# Patient Record
Sex: FEMALE | Race: WHITE | Employment: OTHER | URBAN - METROPOLITAN AREA
[De-identification: names, ages, dates, MRNs, and addresses within clinical notes are randomized per-mention and may not be internally consistent; named-entity substitution may affect disease eponyms.]

---

## 2020-08-03 ENCOUNTER — NURSING HOME VISIT (OUTPATIENT)
Dept: GERIATRICS | Facility: CLINIC | Age: 85
End: 2020-08-03
Payer: MEDICARE

## 2020-08-03 VITALS
RESPIRATION RATE: 17 BRPM | SYSTOLIC BLOOD PRESSURE: 123 MMHG | TEMPERATURE: 95 F | OXYGEN SATURATION: 97 % | HEIGHT: 61 IN | DIASTOLIC BLOOD PRESSURE: 66 MMHG | HEART RATE: 76 BPM | WEIGHT: 125.8 LBS | BODY MASS INDEX: 23.75 KG/M2

## 2020-08-03 DIAGNOSIS — I11.9 HYPERTENSIVE HEART DISEASE WITHOUT HEART FAILURE: ICD-10-CM

## 2020-08-03 DIAGNOSIS — D50.9 IRON DEFICIENCY ANEMIA, UNSPECIFIED IRON DEFICIENCY ANEMIA TYPE: ICD-10-CM

## 2020-08-03 DIAGNOSIS — I50.32 CHRONIC DIASTOLIC CONGESTIVE HEART FAILURE (H): ICD-10-CM

## 2020-08-03 DIAGNOSIS — Z86.73 HISTORY OF CVA (CEREBROVASCULAR ACCIDENT): ICD-10-CM

## 2020-08-03 DIAGNOSIS — G47.00 INSOMNIA, UNSPECIFIED TYPE: ICD-10-CM

## 2020-08-03 DIAGNOSIS — R53.1 GENERAL WEAKNESS: Primary | ICD-10-CM

## 2020-08-03 DIAGNOSIS — I48.20 CHRONIC ATRIAL FIBRILLATION (H): ICD-10-CM

## 2020-08-03 DIAGNOSIS — I25.10 CORONARY ARTERY DISEASE INVOLVING NATIVE CORONARY ARTERY WITHOUT ANGINA PECTORIS, UNSPECIFIED WHETHER NATIVE OR TRANSPLANTED HEART: ICD-10-CM

## 2020-08-03 DIAGNOSIS — J45.40 MODERATE PERSISTENT ASTHMA WITHOUT COMPLICATION: ICD-10-CM

## 2020-08-03 DIAGNOSIS — K21.00 GASTROESOPHAGEAL REFLUX DISEASE WITH ESOPHAGITIS: ICD-10-CM

## 2020-08-03 PROBLEM — R29.6 REPEATED FALLS: Status: ACTIVE | Noted: 2020-08-03

## 2020-08-03 PROBLEM — I48.92 ATRIAL FLUTTER (H): Status: ACTIVE | Noted: 2020-08-03

## 2020-08-03 PROBLEM — M81.0 OSTEOPOROSIS: Status: ACTIVE | Noted: 2020-08-03

## 2020-08-03 PROBLEM — F33.0 MILD RECURRENT MAJOR DEPRESSION (H): Status: ACTIVE | Noted: 2020-08-03

## 2020-08-03 PROBLEM — E78.5 HYPERLIPIDEMIA: Status: ACTIVE | Noted: 2020-08-03

## 2020-08-03 PROCEDURE — 99309 SBSQ NF CARE MODERATE MDM 30: CPT | Performed by: NURSE PRACTITIONER

## 2020-08-03 RX ORDER — ZOLPIDEM TARTRATE 5 MG/1
2.5 TABLET ORAL
COMMUNITY
End: 2021-03-09

## 2020-08-03 RX ORDER — MULTIVIT-MIN/IRON/FOLIC ACID/K 18-600-40
2 CAPSULE ORAL DAILY
COMMUNITY
End: 2022-01-01 | Stop reason: DRUGHIGH

## 2020-08-03 RX ORDER — ALBUTEROL SULFATE 90 UG/1
2 AEROSOL, METERED RESPIRATORY (INHALATION) EVERY 4 HOURS PRN
COMMUNITY
End: 2021-02-01

## 2020-08-03 RX ORDER — CLOPIDOGREL BISULFATE 75 MG/1
75 TABLET ORAL DAILY
COMMUNITY

## 2020-08-03 RX ORDER — ESCITALOPRAM OXALATE 5 MG/1
5 TABLET ORAL DAILY
COMMUNITY

## 2020-08-03 RX ORDER — ISOSORBIDE MONONITRATE 30 MG/1
30 TABLET, EXTENDED RELEASE ORAL DAILY
COMMUNITY
End: 2021-01-01

## 2020-08-03 RX ORDER — PANTOPRAZOLE SODIUM 40 MG/1
40 TABLET, DELAYED RELEASE ORAL DAILY
COMMUNITY

## 2020-08-03 RX ORDER — METOPROLOL TARTRATE 25 MG/1
12.5 TABLET, FILM COATED ORAL 2 TIMES DAILY
COMMUNITY
End: 2021-01-01

## 2020-08-03 RX ORDER — CEFDINIR 300 MG/1
300 CAPSULE ORAL EVERY 12 HOURS
COMMUNITY
End: 2020-08-18

## 2020-08-03 RX ORDER — SENNA AND DOCUSATE SODIUM 50; 8.6 MG/1; MG/1
1 TABLET, FILM COATED ORAL 2 TIMES DAILY PRN
COMMUNITY

## 2020-08-03 RX ORDER — NITROGLYCERIN 0.4 MG/1
0.4 TABLET SUBLINGUAL EVERY 5 MIN PRN
COMMUNITY

## 2020-08-03 RX ORDER — LOSARTAN POTASSIUM 50 MG/1
50 TABLET ORAL DAILY
COMMUNITY
End: 2020-08-24

## 2020-08-03 RX ORDER — HYDROCHLOROTHIAZIDE 25 MG/1
25 TABLET ORAL DAILY
Status: ON HOLD | COMMUNITY
End: 2021-01-01

## 2020-08-03 RX ORDER — LANOLIN ALCOHOL/MO/W.PET/CERES
6 CREAM (GRAM) TOPICAL
COMMUNITY
End: 2021-01-01

## 2020-08-03 RX ORDER — ROSUVASTATIN CALCIUM 5 MG/1
5 TABLET, COATED ORAL EVERY OTHER DAY
COMMUNITY
Start: 2020-08-03

## 2020-08-03 SDOH — HEALTH STABILITY: MENTAL HEALTH: HOW OFTEN DO YOU HAVE A DRINK CONTAINING ALCOHOL?: NEVER

## 2020-08-03 ASSESSMENT — MIFFLIN-ST. JEOR: SCORE: 932.69

## 2020-08-03 NOTE — LETTER
8/3/2020        RE: Ermelinda Pfeiffer  [address]  [facility] [address]        [place] [facility]  PRIMARY CARE PROVIDER AND CLINIC:  Physician No Ref-Primary, No address on file  Chief Complaint   Patient presents with     Hospital F/U     [place] Medical Record Number:  [id number]  Place of Service where encounter took place:  [facility] ([facility]) [[id number]]    HPI:    HPI information obtained from: facility chart records, facility staff, patient report, [facility] chart review and Care Everywhere [facility] chart review.     Brief Summary of Hospital Course:   Ermelinda Pfeiffer  is a 89 year old  (10/19/1930) with a medical history of chronic diastolic heart failure, CAD with stent placement in 92' and 97', atrial fibrillation, pontine CVA in 2019, and osteoporosis. She currently lives at [facility] in [place], [place] where she had progressive weakness and went to her PCP for evaluation. While there, she was less interactive and was sent to [place] for further evaluation.  CT negative, neg blood cultures, neg chest xray. Urine culture returned with  K of ecoli and she was started on mabrobid which was changed to cefdinir when her WBC increased to 20. She developed atrial fib with RVR and was started on metoprolol. Also restarted on PPI for a hx of esophagitis; had some black stool. She was admitted to the above facility from  [facility]. Hospital stay 7/27/2020 through 8/1/2020. Admitted to this facility for  rehab, medical management and nursing care.    Updates on Status Since Skilled nursing Admission:   Ms. Pfeiffer was visited today while in her room just waking for the day. She slept well but felt cold during the night. Has some discomfort in the bilateral heels from being on the bed. She is eating fair, having small bowel movements but no discomfort in having bowel movements. No nausea. Generally ambulates with walker. She has  two daughters who live in the area which is why she came [place] from [place].     CODE STATUS/ADVANCE DIRECTIVES DISCUSSION:   CPR/Full code   Patient's living condition: Assisted Living Facility - [place]  ALLERGIES: Clarithromycin; Levofloxacin; Mirtazapine; Penicillin g; and Sulfamethoxazole-trimethoprim  PAST MEDICAL HISTORY:  has a past medical history of Acute sinus infection, Anemia, CAD (coronary artery disease), Cancer of the skin, basal cell, Cataract, Depressive disorder, Diplopia, Disturbance, sleep, GERD (gastroesophageal reflux disease), HTN (hypertension), Hyperlipidemia, Insomnia, Myocardial infarction (H), Osteoporosis, Skin cancer, Status post parathyroidectomy (05/23/2016), Stone, kidney, and Transient ischemic attack.  PAST SURGICAL HISTORY:   has a past surgical history that includes Parathyroidectomy (1969) and PERCUTANEOUS TRANSLUMINAL BALLOON ANGIOPLASTY WITH INSERTION OF STENT INTO CORONARY ARTERY N/A  (10/29/1997).  FAMILY HISTORY: family history includes Suicide in her maternal aunt and maternal uncle.  SOCIAL HISTORY:   reports that she has quit smoking. She has a 3.25 pack-year smoking history. She has never used smokeless tobacco. She reports that she does not drink alcohol or use drugs.    Post Discharge Medication Reconciliation Status: {ACO Med Rec (Provider):136355}  ***  Current Outpatient Medications   Medication Sig Dispense Refill     albuterol (PROAIR HFA/PROVENTIL HFA/VENTOLIN HFA) 108 (90 Base) MCG/ACT inhaler Inhale 2 puffs into the lungs every 4 hours as needed for shortness of breath / dyspnea or wheezing       cefdinir (OMNICEF) 300 MG capsule Take 300 mg by mouth every 12 hours for UTI for 6 Days       clopidogrel (PLAVIX) 75 MG tablet Take 75 mg by mouth daily       escitalopram (LEXAPRO) 5 MG tablet Take 5 mg by mouth daily       ferrous fumarate 65 mg, [place]. FE,-Vitamin C 125 mg (VITRON C)  MG TABS tablet Take 1 tablet by mouth daily        "fluticasone-salmeterol (ADVAIR) 500-50 MCG/DOSE inhaler Inhale 1 puff into the lungs every 12 hours for Asthma Rinse and spit after each use.       hydrochlorothiazide (HYDRODIURIL) 25 MG tablet Take 25 mg by mouth daily every other day for blood pressure and fluid managment       isosorbide mononitrate (IMDUR) 30 MG 24 hr tablet Take 30 mg by mouth daily       losartan (COZAAR) 50 MG tablet Take 50 mg by mouth daily       melatonin 3 MG tablet Take 6 mg by mouth At Bedtime       metoprolol tartrate (LOPRESSOR) 25 MG tablet Take 12.5 mg by mouth 2 times daily       nitroGLYcerin (NITROSTAT) 0.4 MG sublingual tablet Place 0.4 mg under the tongue every 5 minutes as needed for chest pain For chest pain place 1 tablet under the tongue every 5 minutes for 3 doses. If symptoms persist 5 minutes after 1st dose call 911.       pantoprazole (PROTONIX) 40 MG EC tablet Take 40 mg by mouth daily for Esophagitis Before breakfast       rosuvastatin (CRESTOR) 5 MG tablet Take 1 tablet (5 mg) by mouth daily       SENNA-docusate sodium (SENNA S) 8.6-50 MG tablet Take 1 tablet by mouth one time a day every 3 day(s) for constipation per patient request AND Give 1 tablet by mouth as needed for constipation BID       Vitamin D, Cholecalciferol, 25 MCG (1000 UT) TABS Take 1 tablet by mouth daily       zolpidem (AMBIEN) 5 MG tablet Take 2.5 mg by mouth nightly as needed for sleep       ROS:  4 point ROS including Respiratory, CV, GI and , other than that noted in the HPI,  is negative    Vitals:  /66   Pulse 76   Temp 95  F (35  C)   Resp 17   Ht 1.549 m (5' 0.98\")   Wt 57.1 kg (125 lb 12.8 oz)   SpO2 97%   BMI 23.79 kg/m    Exam:  GENERAL APPEARANCE:  Alert, in no distress, pleasant, cooperative  RESP: no respiratory distress, patient is on room air  CV: . Edema trace in bilateral lower extremities.  M/S:   Gait and station: ambulates with walker, able to move all extremities   SKIN:  Inspection and palpation of skin and " subcutaneous tissue: skin warm, dry and intact without rashes but exam is limited  NEURO: no facial asymmetry, no speech deficits and able to follow directions, moves all extremities symmetrically  PSYCH:  insight, judgement, and memory intact, affect and mood normal    Lab/Diagnostic data:  8/1/20  HGB 10.1  WBC 12.5    K 4.4  Creatinine 0.85    ASSESSMENT/PLAN:  Generalized weakness  Urinary tract infection  Weakness likely due to UTI, underlying medical conditions and decreased mobility with pandemic.   --continue with cefdinir 300 mg BID for 7 day course with last day on 8/9.  --PT/OT for strengthening.     Diastolic HF  CAD  Hypertensive heart disease  Hydrochlorothiazide increased to daily administration after chest xrays showed venous congestion. This was ordered as every other day on discharge orders which will continue with for now as there is no edema today and oxygen saturations have been mid 90s, SBPs have been 120s.   --continue with plavix 75 mg daily  --hydrochlorothiazide 25 mg every other daily  --losartan 50 mg daily  --isosorbide 30 mg daily  --BMP on next lab day  --Continue to monitor blood pressure and adjust medications as needed.    Atrial fibrillation  HR 70-80s. Irregular rhythm today. Started on metoprolol while in the hospital.   --continue with metoprolol 12.5 mg BID  --monitor HR and adjust medications as needed.     Asthma   No wheezing on exam today  --continue with advair 1 puff BID  --albuterol PRN    Left pontine CVA  Does not appear to have deficits from this.   --follow clinically  --rosuvastatin 5 mg daily.     Hx of esophagitis  Restarted on PPI during hospitalization as she was encouraged to take this indefinately. HGB decreased to 10 down from 11.   --recheck HGB this week  --continue with iron and vitamin C supplement  --daily pantaprazole 40 mg daily    Insomnia  Slept well last night. Takes ambien PRN and shared the risks of this med but would still like available to  her if needed.   --melatonin 6 mg at HS  --ambien 2.5 mg daily PRN    Total time spent with patient visit at the skilled nursing facility was 35 min including patient visit, review of past records and phone call to patient contact, Rosalba; message left. Greater than 50% of total time spent with counseling and coordinating care due to the need for follow up bloodwork, current UTI and treatment, medication review.     Electronically signed by:  AZAEL Austin CNP                     Sincerely,        AZAEL Austin CNP

## 2020-08-03 NOTE — LETTER
8/3/2020        RE: Ermelinda Pfeiffer  [address]  [facility] [address]        [place] [facility]  PRIMARY CARE PROVIDER AND CLINIC:  Physician No Ref-Primary, No address on file  Chief Complaint   Patient presents with     Hospital F/U     [place] Medical Record Number:  [id number]  Place of Service where encounter took place:  [facility] ([facility]) [[id number]]    HPI:    HPI information obtained from: facility chart records, facility staff, patient report, [facility] chart review and Care Everywhere [facility] chart review.     Brief Summary of Hospital Course:   Ermelinda Pfeiffer  is a 89 year old  (10/19/1930) with a medical history of chronic diastolic heart failure, CAD with stent placement in 92' and 97', atrial fibrillation, pontine CVA in 2019, and osteoporosis. She currently lives at [facility] in [place], [place] where she had progressive weakness and went to her PCP for evaluation. While there, she was less interactive and was sent to [place] for further evaluation.  CT negative, neg blood cultures, neg chest xray. Urine culture returned with  K of ecoli and she was started on mabrobid which was changed to cefdinir when her WBC increased to 20. She developed atrial fib with RVR and was started on metoprolol. Also restarted on PPI for a hx of esophagitis; had some black stool. She was admitted to the above facility from  [facility]. Hospital stay 7/27/2020 through 8/1/2020. Admitted to this facility for  rehab, medical management and nursing care.    Updates on Status Since Skilled nursing Admission:   Ms. Pfeiffer was visited today while in her room just waking for the day. She slept well but felt cold during the night. Has some discomfort in the bilateral heels from being on the bed. She is eating fair, having small bowel movements but no discomfort in having bowel movements. No nausea. Generally ambulates with walker. She has  two daughters who live in the area which is why she came [place] from [place].     CODE STATUS/ADVANCE DIRECTIVES DISCUSSION:   CPR/Full code   Patient's living condition: Assisted Living Facility - [facility]  ALLERGIES: Clarithromycin; Levofloxacin; Mirtazapine; Penicillin g; and Sulfamethoxazole-trimethoprim  PAST MEDICAL HISTORY:  has a past medical history of Acute sinus infection, Anemia, CAD (coronary artery disease), Cancer of the skin, basal cell, Cataract, Depressive disorder, Diplopia, Disturbance, sleep, GERD (gastroesophageal reflux disease), HTN (hypertension), Hyperlipidemia, Insomnia, Myocardial infarction (H), Osteoporosis, Skin cancer, Status post parathyroidectomy (05/23/2016), Stone, kidney, and Transient ischemic attack.  PAST SURGICAL HISTORY:   has a past surgical history that includes Parathyroidectomy (1969) and PERCUTANEOUS TRANSLUMINAL BALLOON ANGIOPLASTY WITH INSERTION OF STENT INTO CORONARY ARTERY N/A  (10/29/1997).  FAMILY HISTORY: family history includes Suicide in her maternal aunt and maternal uncle.  SOCIAL HISTORY:   reports that she has quit smoking. She has a 3.25 pack-year smoking history. She has never used smokeless tobacco. She reports that she does not drink alcohol or use drugs.    Post Discharge Medication Reconciliation Status: discharge medications reconciled, continue medications without change    Current Outpatient Medications   Medication Sig Dispense Refill     albuterol (PROAIR HFA/PROVENTIL HFA/VENTOLIN HFA) 108 (90 Base) MCG/ACT inhaler Inhale 2 puffs into the lungs every 4 hours as needed for shortness of breath / dyspnea or wheezing       cefdinir (OMNICEF) 300 MG capsule Take 300 mg by mouth every 12 hours for UTI for 6 Days       clopidogrel (PLAVIX) 75 MG tablet Take 75 mg by mouth daily       escitalopram (LEXAPRO) 5 MG tablet Take 5 mg by mouth daily       ferrous fumarate 65 mg, [place]. FE,-Vitamin C 125 mg (VITRON C)  MG TABS tablet Take 1  "tablet by mouth daily       fluticasone-salmeterol (ADVAIR) 500-50 MCG/DOSE inhaler Inhale 1 puff into the lungs every 12 hours for Asthma Rinse and spit after each use.       hydrochlorothiazide (HYDRODIURIL) 25 MG tablet Take 25 mg by mouth daily every other day for blood pressure and fluid managment       isosorbide mononitrate (IMDUR) 30 MG 24 hr tablet Take 30 mg by mouth daily       losartan (COZAAR) 50 MG tablet Take 50 mg by mouth daily       melatonin 3 MG tablet Take 6 mg by mouth At Bedtime       metoprolol tartrate (LOPRESSOR) 25 MG tablet Take 12.5 mg by mouth 2 times daily       nitroGLYcerin (NITROSTAT) 0.4 MG sublingual tablet Place 0.4 mg under the tongue every 5 minutes as needed for chest pain For chest pain place 1 tablet under the tongue every 5 minutes for 3 doses. If symptoms persist 5 minutes after 1st dose call 911.       pantoprazole (PROTONIX) 40 MG EC tablet Take 40 mg by mouth daily for Esophagitis Before breakfast       rosuvastatin (CRESTOR) 5 MG tablet Take 1 tablet (5 mg) by mouth daily       SENNA-docusate sodium (SENNA S) 8.6-50 MG tablet Take 1 tablet by mouth one time a day every 3 day(s) for constipation per patient request AND Give 1 tablet by mouth as needed for constipation BID       Vitamin D, Cholecalciferol, 25 MCG (1000 UT) TABS Take 1 tablet by mouth daily       zolpidem (AMBIEN) 5 MG tablet Take 2.5 mg by mouth nightly as needed for sleep       ROS:  4 point ROS including Respiratory, CV, GI and , other than that noted in the HPI,  is negative    Vitals:  /66   Pulse 76   Temp 95  F (35  C)   Resp 17   Ht 1.549 m (5' 0.98\")   Wt 57.1 kg (125 lb 12.8 oz)   SpO2 97%   BMI 23.79 kg/m    Exam:  GENERAL APPEARANCE:  Alert, in no distress, pleasant, cooperative  RESP: no respiratory distress, patient is on room air  CV: . Edema trace in bilateral lower extremities.  M/S:   Gait and station: ambulates with walker, able to move all extremities   SKIN:  Inspection " and palpation of skin and subcutaneous tissue: skin warm, dry and intact without rashes but exam is limited  NEURO: no facial asymmetry, no speech deficits and able to follow directions, moves all extremities symmetrically  PSYCH:  insight, judgement, and memory intact, affect and mood normal    Lab/Diagnostic data:  8/1/20  HGB 10.1  WBC 12.5    K 4.4  Creatinine 0.85    ASSESSMENT/PLAN:  Generalized weakness  Urinary tract infection  Weakness likely due to UTI, underlying medical conditions and decreased mobility with pandemic.   --continue with cefdinir 300 mg BID for 7 day course with last day on 8/9.  --PT/OT for strengthening.     Diastolic HF  CAD  Hypertensive heart disease  Hydrochlorothiazide increased to daily administration after chest xrays showed venous congestion. This was ordered as every other day on discharge orders which will continue with for now as there is no edema today and oxygen saturations have been mid 90s, SBPs have been 120s.   --continue with plavix 75 mg daily  --hydrochlorothiazide 25 mg every other daily  --losartan 50 mg daily  --isosorbide 30 mg daily  --BMP on next lab day  --Continue to monitor blood pressure and adjust medications as needed.    Atrial fibrillation  HR 70-80s. Irregular rhythm today. Started on metoprolol while in the hospital.   --continue with metoprolol 12.5 mg BID  --monitor HR and adjust medications as needed.     Asthma   No wheezing on exam today  --continue with advair 1 puff BID  --albuterol PRN    Left pontine CVA  Does not appear to have deficits from this.   --follow clinically  --rosuvastatin 5 mg daily.     Hx of esophagitis  Restarted on PPI during hospitalization as she was encouraged to take this indefinately. HGB decreased to 10 down from 11.   --recheck HGB this week  --continue with iron and vitamin C supplement  --daily pantaprazole 40 mg daily    Insomnia  Slept well last night. Takes ambien PRN and shared the risks of this med but  would still like available to her if needed.   --melatonin 6 mg at HS  --ambien 2.5 mg daily PRN    Total time spent with patient visit at the skilled nursing facility was 35 min including patient visit, review of past records and phone call to patient contact, Rosalba; message left. Greater than 50% of total time spent with counseling and coordinating care due to the need for follow up bloodwork, current UTI and treatment, medication review.     Electronically signed by:  AZAEL Austin CNP                     Sincerely,        AZAEL Austin CNP

## 2020-08-03 NOTE — PROGRESS NOTES
[place] [facility]  PRIMARY CARE PROVIDER AND CLINIC:  Physician No Ref-Primary, No address on file  Chief Complaint   Patient presents with     Hospital F/U     [place] Medical Record Number:  [id number]  Place of Service where encounter took place:  [facility] ([facility]) [[id number]]    HPI:    HPI information obtained from: facility chart records, facility staff, patient report, [facility] chart review and Care Everywhere [facility] chart review.     Brief Summary of Hospital Course:   Ermelinda Pfeiffer  is a 89 year old  (10/19/1930) with a medical history of chronic diastolic heart failure, CAD with stent placement in 92' and 97', atrial fibrillation, pontine CVA in 2019, and osteoporosis. She currently lives at [facility] in [place], [place] where she had progressive weakness and went to her PCP for evaluation. While there, she was less interactive and was sent to [place] for further evaluation.  CT negative, neg blood cultures, neg chest xray. Urine culture returned with  K of ecoli and she was started on mabrobid which was changed to cefdinir when her WBC increased to 20. She developed atrial fib with RVR and was started on metoprolol. Also restarted on PPI for a hx of esophagitis; had some black stool. She was admitted to the above facility from  [facility]. Hospital stay 7/27/2020 through 8/1/2020. Admitted to this facility for  rehab, medical management and nursing care.    Updates on Status Since Skilled nursing Admission:   Ms. Pfeiffer was visited today while in her room just waking for the day. She slept well but felt cold during the night. Has some discomfort in the bilateral heels from being on the bed. She is eating fair, having small bowel movements but no discomfort in having bowel movements. No nausea. Generally ambulates with walker. She has two daughters who live in the area which is why she came [place] from [place].     CODE  STATUS/ADVANCE DIRECTIVES DISCUSSION:   CPR/Full code   Patient's living condition: Assisted Living Facility [facility]  ALLERGIES: Clarithromycin; Levofloxacin; Mirtazapine; Penicillin g; and Sulfamethoxazole-trimethoprim  PAST MEDICAL HISTORY:  has a past medical history of Acute sinus infection, Anemia, CAD (coronary artery disease), Cancer of the skin, basal cell, Cataract, Depressive disorder, Diplopia, Disturbance, sleep, GERD (gastroesophageal reflux disease), HTN (hypertension), Hyperlipidemia, Insomnia, Myocardial infarction (H), Osteoporosis, Skin cancer, Status post parathyroidectomy (05/23/2016), Stone, kidney, and Transient ischemic attack.  PAST SURGICAL HISTORY:   has a past surgical history that includes Parathyroidectomy (1969) and PERCUTANEOUS TRANSLUMINAL BALLOON ANGIOPLASTY WITH INSERTION OF STENT INTO CORONARY ARTERY N/A  (10/29/1997).  FAMILY HISTORY: family history includes Suicide in her maternal aunt and maternal uncle.  SOCIAL HISTORY:   reports that she has quit smoking. She has a 3.25 pack-year smoking history. She has never used smokeless tobacco. She reports that she does not drink alcohol or use drugs.    Post Discharge Medication Reconciliation Status: discharge medications reconciled, continue medications without change    Current Outpatient Medications   Medication Sig Dispense Refill     albuterol (PROAIR HFA/PROVENTIL HFA/VENTOLIN HFA) 108 (90 Base) MCG/ACT inhaler Inhale 2 puffs into the lungs every 4 hours as needed for shortness of breath / dyspnea or wheezing       cefdinir (OMNICEF) 300 MG capsule Take 300 mg by mouth every 12 hours for UTI for 6 Days       clopidogrel (PLAVIX) 75 MG tablet Take 75 mg by mouth daily       escitalopram (LEXAPRO) 5 MG tablet Take 5 mg by mouth daily       ferrous fumarate 65 mg, [place]. FE,-Vitamin C 125 mg (VITRON C)  MG TABS tablet Take 1 tablet by mouth daily       fluticasone-salmeterol (ADVAIR) 500-50 MCG/DOSE inhaler Inhale 1 puff  "into the lungs every 12 hours for Asthma Rinse and spit after each use.       hydrochlorothiazide (HYDRODIURIL) 25 MG tablet Take 25 mg by mouth daily every other day for blood pressure and fluid managment       isosorbide mononitrate (IMDUR) 30 MG 24 hr tablet Take 30 mg by mouth daily       losartan (COZAAR) 50 MG tablet Take 50 mg by mouth daily       melatonin 3 MG tablet Take 6 mg by mouth At Bedtime       metoprolol tartrate (LOPRESSOR) 25 MG tablet Take 12.5 mg by mouth 2 times daily       nitroGLYcerin (NITROSTAT) 0.4 MG sublingual tablet Place 0.4 mg under the tongue every 5 minutes as needed for chest pain For chest pain place 1 tablet under the tongue every 5 minutes for 3 doses. If symptoms persist 5 minutes after 1st dose call 911.       pantoprazole (PROTONIX) 40 MG EC tablet Take 40 mg by mouth daily for Esophagitis Before breakfast       rosuvastatin (CRESTOR) 5 MG tablet Take 1 tablet (5 mg) by mouth daily       SENNA-docusate sodium (SENNA S) 8.6-50 MG tablet Take 1 tablet by mouth one time a day every 3 day(s) for constipation per patient request AND Give 1 tablet by mouth as needed for constipation BID       Vitamin D, Cholecalciferol, 25 MCG (1000 UT) TABS Take 1 tablet by mouth daily       zolpidem (AMBIEN) 5 MG tablet Take 2.5 mg by mouth nightly as needed for sleep       ROS:  4 point ROS including Respiratory, CV, GI and , other than that noted in the HPI,  is negative    Vitals:  /66   Pulse 76   Temp 95  F (35  C)   Resp 17   Ht 1.549 m (5' 0.98\")   Wt 57.1 kg (125 lb 12.8 oz)   SpO2 97%   BMI 23.79 kg/m    Exam:  GENERAL APPEARANCE:  Alert, in no distress, pleasant, cooperative  RESP: no respiratory distress, patient is on room air  CV: . Edema trace in bilateral lower extremities.  M/S:   Gait and station: ambulates with walker, able to move all extremities   SKIN:  Inspection and palpation of skin and subcutaneous tissue: skin warm, dry and intact without rashes but exam " is limited  NEURO: no facial asymmetry, no speech deficits and able to follow directions, moves all extremities symmetrically  PSYCH:  insight, judgement, and memory intact, affect and mood normal    Lab/Diagnostic data:  8/1/20  HGB 10.1  WBC 12.5    K 4.4  Creatinine 0.85    ASSESSMENT/PLAN:  Generalized weakness  Urinary tract infection  Weakness likely due to UTI, underlying medical conditions and decreased mobility with pandemic.   --continue with cefdinir 300 mg BID for 7 day course with last day on 8/9.  --PT/OT for strengthening.     Diastolic HF  CAD  Hypertensive heart disease  Hydrochlorothiazide increased to daily administration after chest xrays showed venous congestion. This was ordered as every other day on discharge orders which will continue with for now as there is no edema today and oxygen saturations have been mid 90s, SBPs have been 120s.   --continue with plavix 75 mg daily  --hydrochlorothiazide 25 mg every other daily  --losartan 50 mg daily  --isosorbide 30 mg daily  --BMP on next lab day  --Continue to monitor blood pressure and adjust medications as needed.    Atrial fibrillation  HR 70-80s. Irregular rhythm today. Started on metoprolol while in the hospital.   --continue with metoprolol 12.5 mg BID  --monitor HR and adjust medications as needed.     Asthma   No wheezing on exam today  --continue with advair 1 puff BID  --albuterol PRN    Left pontine CVA  Does not appear to have deficits from this.   --follow clinically  --rosuvastatin 5 mg daily.     Hx of esophagitis  Restarted on PPI during hospitalization as she was encouraged to take this indefinately. HGB decreased to 10 down from 11.   --recheck HGB this week  --continue with iron and vitamin C supplement  --daily pantaprazole 40 mg daily    Insomnia  Slept well last night. Takes ambien PRN and shared the risks of this med but would still like available to her if needed.   --melatonin 6 mg at HS  --ambien 2.5 mg daily  PRN    Total time spent with patient visit at the skilled nursing facility was 35 min including patient visit, review of past records and phone call to patient contact, Rosalba; message left. Greater than 50% of total time spent with counseling and coordinating care due to the need for follow up bloodwork, current UTI and treatment, medication review.     Electronically signed by:  AZAEL Austin CNP

## 2020-08-04 ENCOUNTER — RECORDS - HEALTHEAST (OUTPATIENT)
Dept: LAB | Facility: CLINIC | Age: 85
End: 2020-08-04

## 2020-08-05 ENCOUNTER — TRANSFERRED RECORDS (OUTPATIENT)
Dept: HEALTH INFORMATION MANAGEMENT | Facility: CLINIC | Age: 85
End: 2020-08-05

## 2020-08-05 LAB
ANION GAP SERPL CALCULATED.3IONS-SCNC: 7 MMOL/L (ref 5–18)
ANION GAP SERPL CALCULATED.3IONS-SCNC: 7 MMOL/L (ref 5–18)
BUN SERPL-MCNC: 18 MG/DL (ref 8–28)
BUN SERPL-MCNC: 18 MG/DL (ref 8–28)
CALCIUM SERPL-MCNC: 8.7 MG/DL (ref 8.5–10.5)
CALCIUM SERPL-MCNC: 8.7 MG/DL (ref 8.5–10.5)
CHLORIDE BLD-SCNC: 104 MMOL/L (ref 98–107)
CHLORIDE SERPLBLD-SCNC: 104 MMOL/L (ref 98–107)
CO2 SERPL-SCNC: 28 MMOL/L (ref 22–31)
CO2 SERPL-SCNC: 28 MMOL/L (ref 22–31)
CREAT SERPL-MCNC: 0.78 MG/DL (ref 0.6–1.1)
CREAT SERPL-MCNC: 0.78 MG/DL (ref 0.6–1.1)
ERYTHROCYTE [DISTWIDTH] IN BLOOD BY AUTOMATED COUNT: 13.4 % (ref 11–14.5)
ERYTHROCYTE [DISTWIDTH] IN BLOOD BY AUTOMATED COUNT: 13.4 % (ref 11–14.5)
GFR SERPL CREATININE-BSD FRML MDRD: >60 ML/MIN/1.73M2
GFR SERPL CREATININE-BSD FRML MDRD: >60 ML/MIN/1.73M2
GLUCOSE BLD-MCNC: 89 MG/DL (ref 70–125)
GLUCOSE SERPL-MCNC: 89 MG/DL (ref 70–125)
HCT VFR BLD AUTO: 37.6 % (ref 35–47)
HCT VFR BLD AUTO: 37.6 % (ref 35–47)
HEMOGLOBIN: 11.2 G/DL (ref 12–16)
HGB BLD-MCNC: 11.2 G/DL (ref 12–16)
MCH RBC QN AUTO: 28.9 PG (ref 27–34)
MCH RBC QN AUTO: 28.9 PG (ref 27–34)
MCHC RBC AUTO-ENTMCNC: 29.8 G/DL (ref 32–36)
MCHC RBC AUTO-ENTMCNC: 29.8 G/DL (ref 32–36)
MCV RBC AUTO: 97 FL (ref 80–100)
MCV RBC AUTO: 97 FL (ref 80–100)
PLATELET # BLD AUTO: 363 THOU/UL (ref 140–440)
PLATELET # BLD AUTO: 363 THOU/UL (ref 140–440)
PMV BLD AUTO: 10.6 FL (ref 8.5–12.5)
POTASSIUM BLD-SCNC: 3.8 MMOL/L (ref 3.5–5)
POTASSIUM SERPL-SCNC: 3.8 MMOL/L (ref 3.5–5)
RBC # BLD AUTO: 3.87 MILL/UL (ref 3.8–5.4)
RBC # BLD AUTO: 3.87 MILL/UL (ref 3.8–5.4)
SODIUM SERPL-SCNC: 139 MMOL/L (ref 136–145)
SODIUM SERPL-SCNC: 139 MMOL/L (ref 136–145)
WBC # BLD AUTO: 7.7 THOU/UL (ref 4–11)
WBC: 7.7 THOU/UL (ref 4–11)

## 2020-08-06 ENCOUNTER — NURSING HOME VISIT (OUTPATIENT)
Dept: GERIATRICS | Facility: CLINIC | Age: 85
End: 2020-08-06
Payer: MEDICARE

## 2020-08-06 VITALS
SYSTOLIC BLOOD PRESSURE: 135 MMHG | DIASTOLIC BLOOD PRESSURE: 68 MMHG | BODY MASS INDEX: 23.56 KG/M2 | TEMPERATURE: 94.6 F | WEIGHT: 124.8 LBS | HEART RATE: 81 BPM | RESPIRATION RATE: 17 BRPM | HEIGHT: 61 IN | OXYGEN SATURATION: 96 %

## 2020-08-06 DIAGNOSIS — I25.10 CORONARY ARTERY DISEASE INVOLVING NATIVE CORONARY ARTERY WITHOUT ANGINA PECTORIS, UNSPECIFIED WHETHER NATIVE OR TRANSPLANTED HEART: ICD-10-CM

## 2020-08-06 DIAGNOSIS — N39.0 URINARY TRACT INFECTION WITHOUT HEMATURIA, SITE UNSPECIFIED: ICD-10-CM

## 2020-08-06 DIAGNOSIS — R53.1 GENERAL WEAKNESS: Primary | ICD-10-CM

## 2020-08-06 DIAGNOSIS — J45.40 MODERATE PERSISTENT ASTHMA WITHOUT COMPLICATION: ICD-10-CM

## 2020-08-06 DIAGNOSIS — D50.9 IRON DEFICIENCY ANEMIA, UNSPECIFIED IRON DEFICIENCY ANEMIA TYPE: ICD-10-CM

## 2020-08-06 DIAGNOSIS — Z86.73 HISTORY OF CVA (CEREBROVASCULAR ACCIDENT): ICD-10-CM

## 2020-08-06 DIAGNOSIS — I48.20 CHRONIC ATRIAL FIBRILLATION (H): ICD-10-CM

## 2020-08-06 DIAGNOSIS — I50.32 CHRONIC DIASTOLIC CONGESTIVE HEART FAILURE (H): ICD-10-CM

## 2020-08-06 DIAGNOSIS — I11.9 HYPERTENSIVE HEART DISEASE WITHOUT HEART FAILURE: ICD-10-CM

## 2020-08-06 PROCEDURE — 99309 SBSQ NF CARE MODERATE MDM 30: CPT | Performed by: NURSE PRACTITIONER

## 2020-08-06 ASSESSMENT — MIFFLIN-ST. JEOR: SCORE: 928.15

## 2020-08-06 NOTE — LETTER
8/6/2020        RE: Ermelinda Pfeiffer  [address]  [facility] [address]        [place] [facility]  [place] Medical Record Number:  [id number]  Place of Service where encounter took place:  [facility]) [[id number]]  Chief Complaint   Patient presents with     RECHECK       HPI:    Ermelinda Pfeiffer  is a 89 year old (10/19/1930), who is being seen today for an episodic care visit.  HPI information obtained from: {FGS HPI:306177}. Today's concern is:  {FGS DX:990413}    Past Medical and Surgical History reviewed in Epic today.    MEDICATIONS:  {Providers Please double check the med list (in the plan section >> meds & orders tab) and Discontinue any of the meds flagged by the TC to be discontinued}  Current Outpatient Medications   Medication Sig Dispense Refill     albuterol (PROAIR HFA/PROVENTIL HFA/VENTOLIN HFA) 108 (90 Base) MCG/ACT inhaler Inhale 2 puffs into the lungs every 4 hours as needed for shortness of breath / dyspnea or wheezing       cefdinir (OMNICEF) 300 MG capsule Take 300 mg by mouth every 12 hours for UTI for 6 Days       clopidogrel (PLAVIX) 75 MG tablet Take 75 mg by mouth daily       escitalopram (LEXAPRO) 5 MG tablet Take 5 mg by mouth daily       ferrous fumarate 65 mg, [place]. FE,-Vitamin C 125 mg (VITRON C)  MG TABS tablet Take 1 tablet by mouth daily       fluticasone-salmeterol (ADVAIR) 500-50 MCG/DOSE inhaler Inhale 1 puff into the lungs every 12 hours for Asthma Rinse and spit after each use.       hydrochlorothiazide (HYDRODIURIL) 25 MG tablet Take 25 mg by mouth daily every other day for blood pressure and fluid managment       isosorbide mononitrate (IMDUR) 30 MG 24 hr tablet Take 30 mg by mouth daily       losartan (COZAAR) 50 MG tablet Take 50 mg by mouth daily       melatonin 3 MG tablet Take 6 mg by mouth At Bedtime       metoprolol tartrate (LOPRESSOR) 25 MG tablet Take 12.5 mg by mouth 2 times daily       nitroGLYcerin (NITROSTAT)  "0.4 MG sublingual tablet Place 0.4 mg under the tongue every 5 minutes as needed for chest pain For chest pain place 1 tablet under the tongue every 5 minutes for 3 doses. If symptoms persist 5 minutes after 1st dose call 911.       pantoprazole (PROTONIX) 40 MG EC tablet Take 40 mg by mouth daily for Esophagitis Before breakfast       rosuvastatin (CRESTOR) 5 MG tablet Take 1 tablet (5 mg) by mouth daily       SENNA-docusate sodium (SENNA S) 8.6-50 MG tablet Take 1 tablet by mouth one time a day every 3 day(s) for constipation per patient request AND Give 1 tablet by mouth as needed for constipation BID       Vitamin D, Cholecalciferol, 25 MCG (1000 UT) TABS Take 1 tablet by mouth daily       zolpidem (AMBIEN) 5 MG tablet Take 2.5 mg by mouth nightly as needed for sleep       ***    REVIEW OF SYSTEMS:  {ROS FGS:959332}    Objective:  /68   Pulse 81   Temp 94.6  F (34.8  C)   Resp 17   Ht 1.549 m (5' 0.98\")   Wt 56.6 kg (124 lb 12.8 oz)   SpO2 96%   BMI 23.60 kg/m    Exam:  {Nursing home physical exam :770658}    Labs:   {fgslab:362618}    ASSESSMENT/PLAN:  {FGS DX:163639}    {fgsorders:500267}  ***    {fgstime1:774284}  Electronically signed by:  Raiza Christian ***  {Providers Please double check the med list (in the plan section >> meds & orders tab) and Discontinue any of the meds flagged by the TC to be discontinued}        [place] [facility]  [place] Medical Record Number:  [id number]  Place of Service where encounter took place:  Critical access hospital ([facility]) [[id number]]  Chief Complaint   Patient presents with     RECHECK       HPI:    Ermelinda Pfeiffer  is a 89 year old (10/19/1930), who is being seen today for an episodic care visit.  HPI information obtained from: facility chart records, facility staff, patient report and [place] [facility] Epic chart review.    Ms. Pfeiffer was visited today while in her room, up in the wheelchair working with OT. She denies pain or discomfort. States that she " did not sleep well so used ambien the night before last but became groggy in the morning because she took the dose at 0100. No longer has UTI s/sx. Feel that therapy is going well.      Past Medical and Surgical History reviewed in Epic today.    MEDICATIONS:  Current Outpatient Medications   Medication Sig Dispense Refill     albuterol (PROAIR HFA/PROVENTIL HFA/VENTOLIN HFA) 108 (90 Base) MCG/ACT inhaler Inhale 2 puffs into the lungs every 4 hours as needed for shortness of breath / dyspnea or wheezing       cefdinir (OMNICEF) 300 MG capsule Take 300 mg by mouth every 12 hours for UTI for 6 Days       clopidogrel (PLAVIX) 75 MG tablet Take 75 mg by mouth daily       escitalopram (LEXAPRO) 5 MG tablet Take 5 mg by mouth daily       ferrous fumarate 65 mg, [place]. FE,-Vitamin C 125 mg (VITRON C)  MG TABS tablet Take 1 tablet by mouth daily       fluticasone-salmeterol (ADVAIR) 500-50 MCG/DOSE inhaler Inhale 1 puff into the lungs every 12 hours for Asthma Rinse and spit after each use.       hydrochlorothiazide (HYDRODIURIL) 25 MG tablet Take 25 mg by mouth daily every other day for blood pressure and fluid managment       isosorbide mononitrate (IMDUR) 30 MG 24 hr tablet Take 30 mg by mouth daily       losartan (COZAAR) 50 MG tablet Take 50 mg by mouth daily       melatonin 3 MG tablet Take 6 mg by mouth At Bedtime       metoprolol tartrate (LOPRESSOR) 25 MG tablet Take 12.5 mg by mouth 2 times daily       nitroGLYcerin (NITROSTAT) 0.4 MG sublingual tablet Place 0.4 mg under the tongue every 5 minutes as needed for chest pain For chest pain place 1 tablet under the tongue every 5 minutes for 3 doses. If symptoms persist 5 minutes after 1st dose call 911.       pantoprazole (PROTONIX) 40 MG EC tablet Take 40 mg by mouth daily for Esophagitis Before breakfast       rosuvastatin (CRESTOR) 5 MG tablet Take 1 tablet (5 mg) by mouth daily       SENNA-docusate sodium (SENNA S) 8.6-50 MG tablet Take 1 tablet by mouth one  time a day every 3 day(s) for constipation per patient request AND Give 1 tablet by mouth as needed for constipation BID       Vitamin D, Cholecalciferol, 25 MCG (1000 UT) TABS Take 1 tablet by mouth daily       zolpidem (AMBIEN) 5 MG tablet Take 2.5 mg by mouth nightly as needed for sleep         REVIEW OF SYSTEMS:  4 point ROS including Respiratory, CV, GI and , other than that noted in the HPI,  is negative    Objective:  Exam:  GENERAL APPEARANCE:  Alert, in no distress, pleasant, cooperative. Hard of hearing.   RESP: no respiratory distress, patient is on room air  CV: . Edema trace in bilateral lower extremities.  M/S:   Gait and station: ambulates with walker, able to move all extremities   SKIN:  Inspection and palpation of skin and subcutaneous tissue: skin warm, dry and intact without rashes but exam is limited  NEURO: no facial asymmetry, no speech deficits and able to follow directions, moves all extremities symmetrically  PSYCH:  insight, judgement, and memory intact, affect and mood normal    Labs:   Reviewed     ASSESSMENT/PLAN:  Generalized weakness  Urinary tract infection  Weakness likely due to UTI, underlying medical conditions and decreased mobility with pandemic. No further symptoms of UTI.   --continue with cefdinir 300 mg BID for 7 day course with last day on 8/9.  --PT/OT for strengthening.      Diastolic HF  CAD  Hypertensive heart disease  Hydrochlorothiazide increased to daily administration after chest xrays showed venous congestion in the hospital but she was discharged with every other day and since there is no edema today and oxygen saturations have been mid 90s, SBPs have been 120s, this has not been changed.   --continue with plavix 75 mg daily  --hydrochlorothiazide 25 mg every other daily  --losartan 50 mg daily  --isosorbide 30 mg daily  --Continue to monitor blood pressure and adjust medications as needed.     Atrial fibrillation  HR 70-80s. Irregular rhythm today. Started on  metoprolol while in the hospital.   --continue with metoprolol 12.5 mg BID  --monitor HR and adjust medications as needed.      Asthma   No wheezing on exam today  --continue with advair 1 puff BID  --albuterol PRN     Left pontine CVA  Has vision changes on the right from the cva. Also has some intermittent drooling.   --follow clinically  --rosuvastatin 5 mg daily.      Hx of esophagitis  Restarted on PPI during hospitalization as she was encouraged to take this indefinately. HGB decreased to 10 down from 11.2 and recheck at [facility] was 11.2.   --continue with iron and vitamin C supplement  --daily pantaprazole 40 mg daily     Insomnia  Not sleeping well. Took ambien the night before last at 0100 and was groggy in the morning. Spoke to her about trazadone which she has taken in the past as well. Spoke with daughter, Rosalba who reports Ermelinda's sleep specialists has stated not to try it again due to her sedation and grogginess that she developed while taking it.    --melatonin 6 mg at HS  --ambien 2.5 mg daily PRN but parameters not to administer after 2300.     Electronically signed by:  AZALE Austin CNP             Sincerely,        AZAEL Austin CNP

## 2020-08-06 NOTE — PROGRESS NOTES
[facility]  [place] Medical Record Number:  [id number]  Place of Service where encounter took place:  [facility] ([facility]) [[id number]]  Chief Complaint   Patient presents with     RECHECK       HPI:    Ermelinda Pfeiffer  is a 89 year old (10/19/1930), who is being seen today for an episodic care visit.  HPI information obtained from: facility chart records, facility staff, patient report and [facility] chart review.    Ms. Pfeiffer was visited today while in her room, up in the wheelchair working with OT. She denies pain or discomfort. States that she did not sleep well so used ambien the night before last but became groggy in the morning because she took the dose at 0100. No longer has UTI s/sx. Feel that therapy is going well.      Past Medical and Surgical History reviewed in Epic today.    MEDICATIONS:  Current Outpatient Medications   Medication Sig Dispense Refill     albuterol (PROAIR HFA/PROVENTIL HFA/VENTOLIN HFA) 108 (90 Base) MCG/ACT inhaler Inhale 2 puffs into the lungs every 4 hours as needed for shortness of breath / dyspnea or wheezing       cefdinir (OMNICEF) 300 MG capsule Take 300 mg by mouth every 12 hours for UTI for 6 Days       clopidogrel (PLAVIX) 75 MG tablet Take 75 mg by mouth daily       escitalopram (LEXAPRO) 5 MG tablet Take 5 mg by mouth daily       ferrous fumarate 65 mg, [place]. FE,-Vitamin C 125 mg (VITRON C)  MG TABS tablet Take 1 tablet by mouth daily       fluticasone-salmeterol (ADVAIR) 500-50 MCG/DOSE inhaler Inhale 1 puff into the lungs every 12 hours for Asthma Rinse and spit after each use.       hydrochlorothiazide (HYDRODIURIL) 25 MG tablet Take 25 mg by mouth daily every other day for blood pressure and fluid managment       isosorbide mononitrate (IMDUR) 30 MG 24 hr tablet Take 30 mg by mouth daily       losartan (COZAAR) 50 MG tablet Take 50 mg by mouth daily       melatonin 3 MG tablet Take 6 mg by mouth At Bedtime       metoprolol  tartrate (LOPRESSOR) 25 MG tablet Take 12.5 mg by mouth 2 times daily       nitroGLYcerin (NITROSTAT) 0.4 MG sublingual tablet Place 0.4 mg under the tongue every 5 minutes as needed for chest pain For chest pain place 1 tablet under the tongue every 5 minutes for 3 doses. If symptoms persist 5 minutes after 1st dose call 911.       pantoprazole (PROTONIX) 40 MG EC tablet Take 40 mg by mouth daily for Esophagitis Before breakfast       rosuvastatin (CRESTOR) 5 MG tablet Take 1 tablet (5 mg) by mouth daily       SENNA-docusate sodium (SENNA S) 8.6-50 MG tablet Take 1 tablet by mouth one time a day every 3 day(s) for constipation per patient request AND Give 1 tablet by mouth as needed for constipation BID       Vitamin D, Cholecalciferol, 25 MCG (1000 UT) TABS Take 1 tablet by mouth daily       zolpidem (AMBIEN) 5 MG tablet Take 2.5 mg by mouth nightly as needed for sleep         REVIEW OF SYSTEMS:  4 point ROS including Respiratory, CV, GI and , other than that noted in the HPI,  is negative    Objective:  Exam:  GENERAL APPEARANCE:  Alert, in no distress, pleasant, cooperative. Hard of hearing.   RESP: no respiratory distress, patient is on room air  CV: . Edema trace in bilateral lower extremities.  M/S:   Gait and station: ambulates with walker, able to move all extremities   SKIN:  Inspection and palpation of skin and subcutaneous tissue: skin warm, dry and intact without rashes but exam is limited  NEURO: no facial asymmetry, no speech deficits and able to follow directions, moves all extremities symmetrically  PSYCH:  insight, judgement, and memory intact, affect and mood normal    Labs:   Reviewed     ASSESSMENT/PLAN:  Generalized weakness  Urinary tract infection  Weakness likely due to UTI, underlying medical conditions and decreased mobility with pandemic. No further symptoms of UTI.   --continue with cefdinir 300 mg BID for 7 day course with last day on 8/9.  --PT/OT for strengthening.      Diastolic  HF  CAD  Hypertensive heart disease  Hydrochlorothiazide increased to daily administration after chest xrays showed venous congestion in the hospital but she was discharged with every other day and since there is no edema today and oxygen saturations have been mid 90s, SBPs have been 120s, this has not been changed.   --continue with plavix 75 mg daily  --hydrochlorothiazide 25 mg every other daily  --losartan 50 mg daily  --isosorbide 30 mg daily  --Continue to monitor blood pressure and adjust medications as needed.     Atrial fibrillation  HR 70-80s. Irregular rhythm today. Started on metoprolol while in the hospital.   --continue with metoprolol 12.5 mg BID  --monitor HR and adjust medications as needed.      Asthma   No wheezing on exam today  --continue with advair 1 puff BID  --albuterol PRN     Left pontine CVA  Has vision changes on the right from the cva. Also has some intermittent drooling.   --follow clinically  --rosuvastatin 5 mg daily.      Hx of esophagitis  Restarted on PPI during hospitalization as she was encouraged to take this indefinately. HGB decreased to 10 down from 11.2 and recheck at TCU was 11.2.   --continue with iron and vitamin C supplement  --daily pantaprazole 40 mg daily     Insomnia  Not sleeping well. Took ambien the night before last at 0100 and was groggy in the morning. Spoke to her about trazadone which she has taken in the past as well. Spoke with daughter, Rosalba who reports Ermelinda's sleep specialists has stated not to try it again due to her sedation and grogginess that she developed while taking it.    --melatonin 6 mg at HS  --ambien 2.5 mg daily PRN but parameters not to administer after 2300.     Electronically signed by:  AZAEL Austin CNP

## 2020-08-06 NOTE — LETTER
8/6/2020        RE: Ermelinda Pfeiffer  [address]  [facility] [address]        [facility]  [place] Medical Record Number:  [id number]  Place of Service where encounter took place:  [facility]) [[id number]]  Chief Complaint   Patient presents with     RECHECK       HPI:    Ermelinda Pfeiffer  is a 89 year old (10/19/1930), who is being seen today for an episodic care visit.  HPI information obtained from: {FGS HPI:730098}. Today's concern is:  {FGS DX:027484}    Past Medical and Surgical History reviewed in Epic today.    MEDICATIONS:  {Providers Please double check the med list (in the plan section >> meds & orders tab) and Discontinue any of the meds flagged by the TC to be discontinued}  Current Outpatient Medications   Medication Sig Dispense Refill     albuterol (PROAIR HFA/PROVENTIL HFA/VENTOLIN HFA) 108 (90 Base) MCG/ACT inhaler Inhale 2 puffs into the lungs every 4 hours as needed for shortness of breath / dyspnea or wheezing       cefdinir (OMNICEF) 300 MG capsule Take 300 mg by mouth every 12 hours for UTI for 6 Days       clopidogrel (PLAVIX) 75 MG tablet Take 75 mg by mouth daily       escitalopram (LEXAPRO) 5 MG tablet Take 5 mg by mouth daily       ferrous fumarate 65 mg, [place]. FE,-Vitamin C 125 mg (VITRON C)  MG TABS tablet Take 1 tablet by mouth daily       fluticasone-salmeterol (ADVAIR) 500-50 MCG/DOSE inhaler Inhale 1 puff into the lungs every 12 hours for Asthma Rinse and spit after each use.       hydrochlorothiazide (HYDRODIURIL) 25 MG tablet Take 25 mg by mouth daily every other day for blood pressure and fluid managment       isosorbide mononitrate (IMDUR) 30 MG 24 hr tablet Take 30 mg by mouth daily       losartan (COZAAR) 50 MG tablet Take 50 mg by mouth daily       melatonin 3 MG tablet Take 6 mg by mouth At Bedtime       metoprolol tartrate (LOPRESSOR) 25 MG tablet Take 12.5 mg by mouth 2 times daily       nitroGLYcerin (NITROSTAT)  "0.4 MG sublingual tablet Place 0.4 mg under the tongue every 5 minutes as needed for chest pain For chest pain place 1 tablet under the tongue every 5 minutes for 3 doses. If symptoms persist 5 minutes after 1st dose call 911.       pantoprazole (PROTONIX) 40 MG EC tablet Take 40 mg by mouth daily for Esophagitis Before breakfast       rosuvastatin (CRESTOR) 5 MG tablet Take 1 tablet (5 mg) by mouth daily       SENNA-docusate sodium (SENNA S) 8.6-50 MG tablet Take 1 tablet by mouth one time a day every 3 day(s) for constipation per patient request AND Give 1 tablet by mouth as needed for constipation BID       Vitamin D, Cholecalciferol, 25 MCG (1000 UT) TABS Take 1 tablet by mouth daily       zolpidem (AMBIEN) 5 MG tablet Take 2.5 mg by mouth nightly as needed for sleep       ***    REVIEW OF SYSTEMS:  {ROS FGS:100911}    Objective:  /68   Pulse 81   Temp 94.6  F (34.8  C)   Resp 17   Ht 1.549 m (5' 0.98\")   Wt 56.6 kg (124 lb 12.8 oz)   SpO2 96%   BMI 23.60 kg/m    Exam:  {Nursing home physical exam :247542}    Labs:   {fgslab:278716}    ASSESSMENT/PLAN:  {FGS DX:872233}    {fgsorders:649807}  ***    {fgstime1:527612}  Electronically signed by:  Raiza Christian ***  {Providers Please double check the med list (in the plan section >> meds & orders tab) and Discontinue any of the meds flagged by the TC to be discontinued}        [facility]  [place] Medical Record Number:  [id number]  Place of Service where encounter took place:  [facility] ([facility]) [[id number]]  Chief Complaint   Patient presents with     RECHECK       HPI:    Ermelinda Pfeiffer  is a 89 year old (10/19/1930), who is being seen today for an episodic care visit.  HPI information obtained from: facility chart records, facility staff, patient report and [place] Epic chart review.    Ms. Pfeiffer was visited today while in her room, up in the wheelchair working with OT. She denies pain or discomfort. States that she " did not sleep well so used ambien the night before last but became groggy in the morning because she took the dose at 0100. No longer has UTI s/sx. Feel that therapy is going well.      Past Medical and Surgical History reviewed in Epic today.    MEDICATIONS:  Current Outpatient Medications   Medication Sig Dispense Refill     albuterol (PROAIR HFA/PROVENTIL HFA/VENTOLIN HFA) 108 (90 Base) MCG/ACT inhaler Inhale 2 puffs into the lungs every 4 hours as needed for shortness of breath / dyspnea or wheezing       cefdinir (OMNICEF) 300 MG capsule Take 300 mg by mouth every 12 hours for UTI for 6 Days       clopidogrel (PLAVIX) 75 MG tablet Take 75 mg by mouth daily       escitalopram (LEXAPRO) 5 MG tablet Take 5 mg by mouth daily       ferrous fumarate 65 mg, [place]. FE,-Vitamin C 125 mg (VITRON C)  MG TABS tablet Take 1 tablet by mouth daily       fluticasone-salmeterol (ADVAIR) 500-50 MCG/DOSE inhaler Inhale 1 puff into the lungs every 12 hours for Asthma Rinse and spit after each use.       hydrochlorothiazide (HYDRODIURIL) 25 MG tablet Take 25 mg by mouth daily every other day for blood pressure and fluid managment       isosorbide mononitrate (IMDUR) 30 MG 24 hr tablet Take 30 mg by mouth daily       losartan (COZAAR) 50 MG tablet Take 50 mg by mouth daily       melatonin 3 MG tablet Take 6 mg by mouth At Bedtime       metoprolol tartrate (LOPRESSOR) 25 MG tablet Take 12.5 mg by mouth 2 times daily       nitroGLYcerin (NITROSTAT) 0.4 MG sublingual tablet Place 0.4 mg under the tongue every 5 minutes as needed for chest pain For chest pain place 1 tablet under the tongue every 5 minutes for 3 doses. If symptoms persist 5 minutes after 1st dose call 911.       pantoprazole (PROTONIX) 40 MG EC tablet Take 40 mg by mouth daily for Esophagitis Before breakfast       rosuvastatin (CRESTOR) 5 MG tablet Take 1 tablet (5 mg) by mouth daily       SENNA-docusate sodium (SENNA S) 8.6-50 MG tablet Take 1 tablet by mouth one  time a day every 3 day(s) for constipation per patient request AND Give 1 tablet by mouth as needed for constipation BID       Vitamin D, Cholecalciferol, 25 MCG (1000 UT) TABS Take 1 tablet by mouth daily       zolpidem (AMBIEN) 5 MG tablet Take 2.5 mg by mouth nightly as needed for sleep         REVIEW OF SYSTEMS:  4 point ROS including Respiratory, CV, GI and , other than that noted in the HPI,  is negative    Objective:  Exam:  GENERAL APPEARANCE:  Alert, in no distress, pleasant, cooperative. Hard of hearing.   RESP: no respiratory distress, patient is on room air  CV: . Edema trace in bilateral lower extremities.  M/S:   Gait and station: ambulates with walker, able to move all extremities   SKIN:  Inspection and palpation of skin and subcutaneous tissue: skin warm, dry and intact without rashes but exam is limited  NEURO: no facial asymmetry, no speech deficits and able to follow directions, moves all extremities symmetrically  PSYCH:  insight, judgement, and memory intact, affect and mood normal    Labs:   Reviewed     ASSESSMENT/PLAN:  Generalized weakness  Urinary tract infection  Weakness likely due to UTI, underlying medical conditions and decreased mobility with pandemic. No further symptoms of UTI.   --continue with cefdinir 300 mg BID for 7 day course with last day on 8/9.  --PT/OT for strengthening.      Diastolic HF  CAD  Hypertensive heart disease  Hydrochlorothiazide increased to daily administration after chest xrays showed venous congestion in the hospital but she was discharged with every other day and since there is no edema today and oxygen saturations have been mid 90s, SBPs have been 120s, this has not been changed.   --continue with plavix 75 mg daily  --hydrochlorothiazide 25 mg every other daily  --losartan 50 mg daily  --isosorbide 30 mg daily  --Continue to monitor blood pressure and adjust medications as needed.     Atrial fibrillation  HR 70-80s. Irregular rhythm today. Started on  metoprolol while in the hospital.   --continue with metoprolol 12.5 mg BID  --monitor HR and adjust medications as needed.      Asthma   No wheezing on exam today  --continue with advair 1 puff BID  --albuterol PRN     Left pontine CVA  Has vision changes on the right from the cva. Also has some intermittent drooling.   --follow clinically  --rosuvastatin 5 mg daily.      Hx of esophagitis  Restarted on PPI during hospitalization as she was encouraged to take this indefinately. HGB decreased to 10 down from 11.2 and recheck at [facility] was 11.2.   --continue with iron and vitamin C supplement  --daily pantaprazole 40 mg daily     Insomnia  Not sleeping well. Took ambien the night before last at 0100 and was groggy in the morning. Spoke to her about trazadone which she has taken in the past as well. Spoke with daughter, Rosalba who reports Ermelinda's sleep specialists has stated not to try it again due to her sedation and grogginess that she developed while taking it.    --melatonin 6 mg at HS  --ambien 2.5 mg daily PRN but parameters not to administer after 2300.     Electronically signed by:  AZAEL [place] CNP             Sincerely,        AZAEL [place] CNP

## 2020-08-10 ENCOUNTER — NURSING HOME VISIT (OUTPATIENT)
Dept: GERIATRICS | Facility: CLINIC | Age: 85
End: 2020-08-10
Payer: MEDICARE

## 2020-08-10 VITALS
OXYGEN SATURATION: 95 % | HEIGHT: 61 IN | HEART RATE: 74 BPM | WEIGHT: 128.1 LBS | DIASTOLIC BLOOD PRESSURE: 82 MMHG | RESPIRATION RATE: 16 BRPM | SYSTOLIC BLOOD PRESSURE: 157 MMHG | TEMPERATURE: 96.1 F | BODY MASS INDEX: 24.19 KG/M2

## 2020-08-10 DIAGNOSIS — I11.9 HYPERTENSIVE HEART DISEASE WITHOUT HEART FAILURE: ICD-10-CM

## 2020-08-10 DIAGNOSIS — G47.00 INSOMNIA, UNSPECIFIED TYPE: ICD-10-CM

## 2020-08-10 DIAGNOSIS — I48.20 CHRONIC ATRIAL FIBRILLATION (H): ICD-10-CM

## 2020-08-10 DIAGNOSIS — I50.32 CHRONIC DIASTOLIC CONGESTIVE HEART FAILURE (H): ICD-10-CM

## 2020-08-10 DIAGNOSIS — D50.9 IRON DEFICIENCY ANEMIA, UNSPECIFIED IRON DEFICIENCY ANEMIA TYPE: ICD-10-CM

## 2020-08-10 DIAGNOSIS — Z86.73 HISTORY OF CVA (CEREBROVASCULAR ACCIDENT): ICD-10-CM

## 2020-08-10 DIAGNOSIS — I25.10 CORONARY ARTERY DISEASE INVOLVING NATIVE CORONARY ARTERY WITHOUT ANGINA PECTORIS, UNSPECIFIED WHETHER NATIVE OR TRANSPLANTED HEART: ICD-10-CM

## 2020-08-10 DIAGNOSIS — J45.40 MODERATE PERSISTENT ASTHMA WITHOUT COMPLICATION: ICD-10-CM

## 2020-08-10 DIAGNOSIS — R53.81 PHYSICAL DECONDITIONING: ICD-10-CM

## 2020-08-10 DIAGNOSIS — K21.00 GASTROESOPHAGEAL REFLUX DISEASE WITH ESOPHAGITIS: ICD-10-CM

## 2020-08-10 DIAGNOSIS — R53.1 GENERAL WEAKNESS: Primary | ICD-10-CM

## 2020-08-10 PROBLEM — I48.92 ATRIAL FLUTTER (H): Status: ACTIVE | Noted: 2020-07-28

## 2020-08-10 PROBLEM — N81.9 FEMALE GENITAL PROLAPSE: Status: ACTIVE | Noted: 2018-11-15

## 2020-08-10 PROBLEM — H49.20 ABDUCENS NERVE PALSY: Status: ACTIVE | Noted: 2019-05-09

## 2020-08-10 PROBLEM — H90.3 SENSORINEURAL HEARING LOSS (SNHL) OF BOTH EARS: Status: ACTIVE | Noted: 2019-12-16

## 2020-08-10 PROBLEM — M81.0 OSTEOPOROSIS: Status: ACTIVE | Noted: 2020-01-29

## 2020-08-10 PROBLEM — I70.0 ATHEROSCLEROSIS OF AORTA (H): Status: ACTIVE | Noted: 2020-04-17

## 2020-08-10 PROBLEM — N39.0 RECURRENT URINARY TRACT INFECTION: Status: ACTIVE | Noted: 2019-03-11

## 2020-08-10 PROBLEM — F33.0 MILD EPISODE OF RECURRENT MAJOR DEPRESSIVE DISORDER (H): Status: ACTIVE | Noted: 2018-11-15

## 2020-08-10 PROBLEM — H54.10: Status: ACTIVE | Noted: 2019-12-16

## 2020-08-10 PROBLEM — Z71.89 OTHER SPECIFIED COUNSELING: Chronic | Status: ACTIVE | Noted: 2019-12-16

## 2020-08-10 PROBLEM — N39.41 URGE INCONTINENCE OF URINE: Status: ACTIVE | Noted: 2018-11-15

## 2020-08-10 PROCEDURE — 99309 SBSQ NF CARE MODERATE MDM 30: CPT | Performed by: NURSE PRACTITIONER

## 2020-08-10 ASSESSMENT — MIFFLIN-ST. JEOR: SCORE: 943.44

## 2020-08-10 NOTE — LETTER
8/10/2020        RE: Ermelinda Pfeiffer  [address]  [address]  [facility] [address]        [facility]  [place] Medical Record Number:  [id number]  Place of Service where encounter took place:  [facility] ([facility]) [[id number]]  Chief Complaint   Patient presents with     RECHECK       HPI:    Ermelinda Pfeiffer  is a 89 year old (10/19/1930), who is being seen today for an episodic care visit.  HPI information obtained from: facility chart records, facility staff, patient report and [facility] chart review.    Ms. Pfeiffer was visited today while in her room, up in the wheelchair. She is hungry and waiting for breakfast. She denies pain or discomfort. States that she did not sleep well so used ambien at 2100 and did not fall asleep until after midnight. No longer has UTI s/sx. Feels that therapy is going well and she is eager to be discharged.       Past Medical and Surgical History reviewed in Epic today.    MEDICATIONS:  Current Outpatient Medications   Medication Sig Dispense Refill     albuterol (PROAIR HFA/PROVENTIL HFA/VENTOLIN HFA) 108 (90 Base) MCG/ACT inhaler Inhale 2 puffs into the lungs every 4 hours as needed for shortness of breath / dyspnea or wheezing       cefdinir (OMNICEF) 300 MG capsule Take 300 mg by mouth every 12 hours for UTI for 6 Days       clopidogrel (PLAVIX) 75 MG tablet Take 75 mg by mouth daily       escitalopram (LEXAPRO) 5 MG tablet Take 5 mg by mouth daily       ferrous fumarate 65 mg, [place]. FE,-Vitamin C 125 mg (VITRON C)  MG TABS tablet Take 1 tablet by mouth daily       fluticasone-salmeterol (ADVAIR) 500-50 MCG/DOSE inhaler Inhale 1 puff into the lungs every 12 hours for Asthma Rinse and spit after each use.       hydrochlorothiazide (HYDRODIURIL) 25 MG tablet Take 25 mg by mouth daily every other day for blood pressure and fluid managment       isosorbide mononitrate (IMDUR) 30 MG 24 hr tablet Take 30 mg by mouth daily       losartan  (COZAAR) 50 MG tablet Take 50 mg by mouth daily       melatonin 3 MG tablet Take 6 mg by mouth At Bedtime       metoprolol tartrate (LOPRESSOR) 25 MG tablet Take 12.5 mg by mouth 2 times daily       nitroGLYcerin (NITROSTAT) 0.4 MG sublingual tablet Place 0.4 mg under the tongue every 5 minutes as needed for chest pain For chest pain place 1 tablet under the tongue every 5 minutes for 3 doses. If symptoms persist 5 minutes after 1st dose call 911.       pantoprazole (PROTONIX) 40 MG EC tablet Take 40 mg by mouth daily for Esophagitis Before breakfast       rosuvastatin (CRESTOR) 5 MG tablet Take 1 tablet (5 mg) by mouth daily       SENNA-docusate sodium (SENNA S) 8.6-50 MG tablet Take 1 tablet by mouth one time a day every 3 day(s) for constipation per patient request AND Give 1 tablet by mouth as needed for constipation BID       Vitamin D, Cholecalciferol, 25 MCG (1000 UT) TABS Take 1 tablet by mouth daily       zolpidem (AMBIEN) 5 MG tablet Take 2.5 mg by mouth nightly as needed for sleep         REVIEW OF SYSTEMS:  4 point ROS including Respiratory, CV, GI and , other than that noted in the HPI,  is negative    Objective:  Exam:  GENERAL APPEARANCE:  Alert, in no distress, pleasant, cooperative. Hard of hearing.   RESP: no respiratory distress, patient is on room air  CV: . Edema 1+ in bilateral lower extremities.  M/S:   Gait and station: ambulates with walker, able to move all extremities   SKIN:  Inspection and palpation of skin and subcutaneous tissue: skin warm, dry and intact without rashes but exam is limited  NEURO: no facial asymmetry, no speech deficits and able to follow directions, moves all extremities symmetrically  PSYCH:  insight, judgement, and memory intact, affect and mood normal    Labs:   Reviewed     ASSESSMENT/PLAN:  Generalized weakness  Urinary tract infection  Weakness likely due to UTI, underlying medical conditions and decreased mobility with pandemic. No further symptoms of UTI as  she completed the cefdinir yesterday.   --PT/OT for strengthening.   --will likely be moving to her daughters home after therapy has been completed.      Diastolic HF  CAD  Hypertensive heart disease  Hydrochlorothiazide increased to daily administration after chest xrays showed venous congestion in the hospital but she was discharged with every other day and blood pressures have been 110-120s. Has some mild lower extremity edema from sitting in the dependent positioning  --start tubigrips on in the a.m and off at HS.    --continue with plavix 75 mg daily  --hydrochlorothiazide 25 mg every other daily  --losartan 50 mg daily  --isosorbide 30 mg daily  --Continue to monitor blood pressure and adjust medications as needed.     Atrial fibrillation  HR 70-80s. Started on metoprolol while in the hospital.   --continue with metoprolol 12.5 mg BID  --monitor HR and adjust medications as needed.      Asthma   No wheezing on exam today  --continue with advair 1 puff BID  --albuterol PRN     Left pontine CVA  Has vision changes on the right from the cva. Also has some intermittent drooling.   --follow clinically  --rosuvastatin 5 mg daily.   --SLP following for the drooling.      Hx of esophagitis  Restarted on PPI during hospitalization as she was encouraged to take this indefinately. HGB decreased to 10 down from 11.2 and recheck at TCU was 11.2.   --continue with iron and vitamin C supplement  --daily pantaprazole 40 mg daily     Insomnia  Unable to fall asleep until after midnight last night.     --melatonin 6 mg at HS  --ambien 2.5 mg daily PRN but parameters not to administer after 2300.     Electronically signed by:  AZAEL [place] CNP             Sincerely,        AZAEL [place] CNP

## 2020-08-10 NOTE — PROGRESS NOTES
[facility]  [place] Medical Record Number:  [id number]  Place of Service where encounter took place:  [facility] ([facility]) [[id number]]  Chief Complaint   Patient presents with     RECHECK       HPI:    Ermelinda Pfeiffer  is a 89 year old (10/19/1930), who is being seen today for an episodic care visit.  HPI information obtained from: facility chart records, facility staff, patient report and [facility] chart review.    Ms. Pfeiffer was visited today while in her room, up in the wheelchair. She is hungry and waiting for breakfast. She denies pain or discomfort. States that she did not sleep well so used ambien at 2100 and did not fall asleep until after midnight. No longer has UTI s/sx. Feels that therapy is going well and she is eager to be discharged.       Past Medical and Surgical History reviewed in Epic today.    MEDICATIONS:  Current Outpatient Medications   Medication Sig Dispense Refill     albuterol (PROAIR HFA/PROVENTIL HFA/VENTOLIN HFA) 108 (90 Base) MCG/ACT inhaler Inhale 2 puffs into the lungs every 4 hours as needed for shortness of breath / dyspnea or wheezing       cefdinir (OMNICEF) 300 MG capsule Take 300 mg by mouth every 12 hours for UTI for 6 Days       clopidogrel (PLAVIX) 75 MG tablet Take 75 mg by mouth daily       escitalopram (LEXAPRO) 5 MG tablet Take 5 mg by mouth daily       ferrous fumarate 65 mg, [place]. FE,-Vitamin C 125 mg (VITRON C)  MG TABS tablet Take 1 tablet by mouth daily       fluticasone-salmeterol (ADVAIR) 500-50 MCG/DOSE inhaler Inhale 1 puff into the lungs every 12 hours for Asthma Rinse and spit after each use.       hydrochlorothiazide (HYDRODIURIL) 25 MG tablet Take 25 mg by mouth daily every other day for blood pressure and fluid managment       isosorbide mononitrate (IMDUR) 30 MG 24 hr tablet Take 30 mg by mouth daily       losartan (COZAAR) 50 MG tablet Take 50 mg by mouth daily       melatonin 3 MG tablet Take 6 mg by mouth At  Bedtime       metoprolol tartrate (LOPRESSOR) 25 MG tablet Take 12.5 mg by mouth 2 times daily       nitroGLYcerin (NITROSTAT) 0.4 MG sublingual tablet Place 0.4 mg under the tongue every 5 minutes as needed for chest pain For chest pain place 1 tablet under the tongue every 5 minutes for 3 doses. If symptoms persist 5 minutes after 1st dose call 911.       pantoprazole (PROTONIX) 40 MG EC tablet Take 40 mg by mouth daily for Esophagitis Before breakfast       rosuvastatin (CRESTOR) 5 MG tablet Take 1 tablet (5 mg) by mouth daily       SENNA-docusate sodium (SENNA S) 8.6-50 MG tablet Take 1 tablet by mouth one time a day every 3 day(s) for constipation per patient request AND Give 1 tablet by mouth as needed for constipation BID       Vitamin D, Cholecalciferol, 25 MCG (1000 UT) TABS Take 1 tablet by mouth daily       zolpidem (AMBIEN) 5 MG tablet Take 2.5 mg by mouth nightly as needed for sleep         REVIEW OF SYSTEMS:  4 point ROS including Respiratory, CV, GI and , other than that noted in the HPI,  is negative    Objective:  Exam:  GENERAL APPEARANCE:  Alert, in no distress, pleasant, cooperative. Hard of hearing.   RESP: no respiratory distress, patient is on room air  CV: . Edema 1+ in bilateral lower extremities.  M/S:   Gait and station: ambulates with walker, able to move all extremities   SKIN:  Inspection and palpation of skin and subcutaneous tissue: skin warm, dry and intact without rashes but exam is limited  NEURO: no facial asymmetry, no speech deficits and able to follow directions, moves all extremities symmetrically  PSYCH:  insight, judgement, and memory intact, affect and mood normal    Labs:   Reviewed     ASSESSMENT/PLAN:  Generalized weakness  Urinary tract infection  Weakness likely due to UTI, underlying medical conditions and decreased mobility with pandemic. No further symptoms of UTI as she completed the cefdinir yesterday.   --PT/OT for strengthening.   --will likely be moving to  her daughters home after therapy has been completed.      Diastolic HF  CAD  Hypertensive heart disease  Hydrochlorothiazide increased to daily administration after chest xrays showed venous congestion in the hospital but she was discharged with every other day and blood pressures have been 110-120s. Has some mild lower extremity edema from sitting in the dependent positioning  --start tubigrips on in the a.m and off at HS.    --continue with plavix 75 mg daily  --hydrochlorothiazide 25 mg every other daily  --losartan 50 mg daily  --isosorbide 30 mg daily  --Continue to monitor blood pressure and adjust medications as needed.     Atrial fibrillation  HR 70-80s. Started on metoprolol while in the hospital.   --continue with metoprolol 12.5 mg BID  --monitor HR and adjust medications as needed.      Asthma   No wheezing on exam today  --continue with advair 1 puff BID  --albuterol PRN     Left pontine CVA  Has vision changes on the right from the cva. Also has some intermittent drooling.   --follow clinically  --rosuvastatin 5 mg daily.   --SLP following for the drooling.      Hx of esophagitis  Restarted on PPI during hospitalization as she was encouraged to take this indefinately. HGB decreased to 10 down from 11.2 and recheck at TCU was 11.2.   --continue with iron and vitamin C supplement  --daily pantaprazole 40 mg daily     Insomnia  Unable to fall asleep until after midnight last night.     --melatonin 6 mg at HS  --ambien 2.5 mg daily PRN but parameters not to administer after 2300.     Electronically signed by:  AZAEL [place] CNP

## 2020-08-13 ENCOUNTER — NURSING HOME VISIT (OUTPATIENT)
Dept: GERIATRICS | Facility: CLINIC | Age: 85
End: 2020-08-13
Payer: MEDICARE

## 2020-08-13 VITALS
WEIGHT: 128 LBS | HEART RATE: 71 BPM | BODY MASS INDEX: 24.17 KG/M2 | OXYGEN SATURATION: 96 % | HEIGHT: 61 IN | RESPIRATION RATE: 17 BRPM | TEMPERATURE: 95.3 F | SYSTOLIC BLOOD PRESSURE: 127 MMHG | DIASTOLIC BLOOD PRESSURE: 61 MMHG

## 2020-08-13 DIAGNOSIS — J45.40 MODERATE PERSISTENT ASTHMA WITHOUT COMPLICATION: ICD-10-CM

## 2020-08-13 DIAGNOSIS — Z86.73 HISTORY OF CVA (CEREBROVASCULAR ACCIDENT): ICD-10-CM

## 2020-08-13 DIAGNOSIS — I50.32 CHRONIC DIASTOLIC CONGESTIVE HEART FAILURE (H): ICD-10-CM

## 2020-08-13 DIAGNOSIS — D50.9 IRON DEFICIENCY ANEMIA, UNSPECIFIED IRON DEFICIENCY ANEMIA TYPE: ICD-10-CM

## 2020-08-13 DIAGNOSIS — I11.9 HYPERTENSIVE HEART DISEASE WITHOUT HEART FAILURE: ICD-10-CM

## 2020-08-13 DIAGNOSIS — G47.00 INSOMNIA, UNSPECIFIED TYPE: ICD-10-CM

## 2020-08-13 DIAGNOSIS — I25.10 CORONARY ARTERY DISEASE INVOLVING NATIVE CORONARY ARTERY WITHOUT ANGINA PECTORIS, UNSPECIFIED WHETHER NATIVE OR TRANSPLANTED HEART: ICD-10-CM

## 2020-08-13 DIAGNOSIS — R53.81 PHYSICAL DECONDITIONING: ICD-10-CM

## 2020-08-13 DIAGNOSIS — K21.00 GASTROESOPHAGEAL REFLUX DISEASE WITH ESOPHAGITIS: ICD-10-CM

## 2020-08-13 DIAGNOSIS — I48.20 CHRONIC ATRIAL FIBRILLATION (H): Primary | ICD-10-CM

## 2020-08-13 PROBLEM — F43.22 ADJUSTMENT DISORDER WITH ANXIETY: Status: ACTIVE | Noted: 2019-03-27

## 2020-08-13 PROBLEM — F32.9 CURRENT EPISODE OF MAJOR DEPRESSIVE DISORDER WITHOUT PRIOR EPISODE: Status: ACTIVE | Noted: 2019-03-27

## 2020-08-13 PROBLEM — F51.09 OTHER INSOMNIA NOT DUE TO A SUBSTANCE OR KNOWN PHYSIOLOGICAL CONDITION: Status: ACTIVE | Noted: 2018-11-15

## 2020-08-13 PROCEDURE — 99309 SBSQ NF CARE MODERATE MDM 30: CPT | Performed by: NURSE PRACTITIONER

## 2020-08-13 ASSESSMENT — MIFFLIN-ST. JEOR: SCORE: 942.98

## 2020-08-13 NOTE — PROGRESS NOTES
[facility]  [place] Medical Record Number:  [id number]  Place of Service where encounter took place:  [facility] ([facility]) [[id number]]  Chief Complaint   Patient presents with     RECHECK       HPI:    Ermelinda Pfeiffer  is a 89 year old (10/19/1930), who is being seen today for an episodic care visit.  HPI information obtained from: facility chart records, facility staff, patient report and [facility] chart review.    Ms. Pfeiffer was visited today while in her room, up in the wheelchair eating breakfast. She slept well for the past two nights. Has pain in the right 4th toe while shoes are on so these were removed while she is eating. Feels that therapy is going well and she is eager to be discharged.       Past Medical and Surgical History reviewed in Epic today.    MEDICATIONS:  Current Outpatient Medications   Medication Sig Dispense Refill     albuterol (PROAIR HFA/PROVENTIL HFA/VENTOLIN HFA) 108 (90 Base) MCG/ACT inhaler Inhale 2 puffs into the lungs every 4 hours as needed for shortness of breath / dyspnea or wheezing       cefdinir (OMNICEF) 300 MG capsule Take 300 mg by mouth every 12 hours for UTI for 6 Days       clopidogrel (PLAVIX) 75 MG tablet Take 75 mg by mouth daily       escitalopram (LEXAPRO) 5 MG tablet Take 5 mg by mouth daily       ferrous fumarate 65 mg, [place]. FE,-Vitamin C 125 mg (VITRON C)  MG TABS tablet Take 1 tablet by mouth daily       fluticasone-salmeterol (ADVAIR) 500-50 MCG/DOSE inhaler Inhale 1 puff into the lungs every 12 hours for Asthma Rinse and spit after each use.       hydrochlorothiazide (HYDRODIURIL) 25 MG tablet Take 25 mg by mouth daily every other day for blood pressure and fluid managment       isosorbide mononitrate (IMDUR) 30 MG 24 hr tablet Take 30 mg by mouth daily       losartan (COZAAR) 50 MG tablet Take 50 mg by mouth daily       melatonin 3 MG tablet Take 6 mg by mouth At Bedtime       metoprolol tartrate (LOPRESSOR) 25 MG  tablet Take 12.5 mg by mouth 2 times daily       nitroGLYcerin (NITROSTAT) 0.4 MG sublingual tablet Place 0.4 mg under the tongue every 5 minutes as needed for chest pain For chest pain place 1 tablet under the tongue every 5 minutes for 3 doses. If symptoms persist 5 minutes after 1st dose call 911.       pantoprazole (PROTONIX) 40 MG EC tablet Take 40 mg by mouth daily for Esophagitis Before breakfast       rosuvastatin (CRESTOR) 5 MG tablet Take 1 tablet (5 mg) by mouth daily       SENNA-docusate sodium (SENNA S) 8.6-50 MG tablet Take 1 tablet by mouth one time a day every 3 day(s) for constipation per patient request AND Give 1 tablet by mouth as needed for constipation BID       Vitamin D, Cholecalciferol, 25 MCG (1000 UT) TABS Take 1 tablet by mouth daily       zolpidem (AMBIEN) 5 MG tablet Take 2.5 mg by mouth nightly as needed for sleep         REVIEW OF SYSTEMS:  4 point ROS including Respiratory, CV, GI and , other than that noted in the HPI,  is negative    Objective:  Exam:  GENERAL APPEARANCE:  Alert, in no distress, pleasant, cooperative. Hard of hearing.   RESP: no respiratory distress, patient is on room air  CV: . Edema 1+ in bilateral lower extremities.  M/S:   Gait and station: ambulates with walker, able to move all extremities   SKIN:  Inspection and palpation of skin and subcutaneous tissue: skin warm, dry and intact without rashes but exam is limited  NEURO: no facial asymmetry, no speech deficits and able to follow directions, moves all extremities symmetrically  PSYCH:  insight, judgement, and memory intact, affect and mood normal    Labs:   Reviewed     ASSESSMENT/PLAN:.   Diastolic HF  CAD  Hypertensive heart disease  Hydrochlorothiazide increased to daily administration after chest xrays showed venous congestion in the hospital but she was discharged with every other day and blood pressures have been 120-140s so this was left as scheduled every other day. Has some mild lower extremity  edema from sitting in the dependent positioning in which tubi  were ordered earlier this week but not in place today so will check with nursing regarding the stockings.   --continue with tubigrips on in the a.m and off at HS.   --continue with plavix 75 mg daily  --hydrochlorothiazide 25 mg every other daily  --losartan 50 mg daily  --isosorbide 30 mg daily  --Continue to monitor blood pressure and adjust medications as needed.     Atrial fibrillation  HR 70-80s. Started on metoprolol while in the hospital.   --continue with metoprolol 12.5 mg BID  --monitor HR and adjust medications as needed.      Asthma   No wheezing on exam today  --continue with advair 1 puff BID  --albuterol PRN     Left pontine CVA  Has vision changes on the right from the cva. Also has some intermittent drooling in which she is working with speech therapy for.   --follow clinically  --rosuvastatin 5 mg daily.   --SLP following for the drooling.      Hx of esophagitis  Restarted on PPI during hospitalization as she was encouraged to take this indefinately. HGB decreased to 10 down from 11.2 and recheck at TCU was 11.2.   --continue with iron and vitamin C supplement  --daily pantaprazole 40 mg daily  --HGB if clinically indicated.      Insomnia  Slept well for the past two nights.  --melatonin 6 mg at HS  --ambien 2.5 mg daily PRN but parameters not to administer after 2300.     Physical deconditioning  Secondary to recent hospitalization and underlying medical conditions.   --ongoing PT/OT for strengthening.       Electronically signed by:  AZAEL Austin CNP

## 2020-08-13 NOTE — LETTER
8/13/2020        RE: Ermelinda Pfeiffer  [address]  [address]  [facility] [address]        [facility]  [place] Medical Record Number:  [id number]  Place of Service where encounter took place:  [facility] ([facility]) [[id number]]  Chief Complaint   Patient presents with     RECHECK       HPI:    Ermelinda Pfeiffer  is a 89 year old (10/19/1930), who is being seen today for an episodic care visit.  HPI information obtained from: facility chart records, facility staff, patient report and [facility] chart review.    Ms. Pfeiffer was visited today while in her room, up in the wheelchair eating breakfast. She slept well for the past two nights. Has pain in the right 4th toe while shoes are on so these were removed while she is eating. Feels that therapy is going well and she is eager to be discharged.       Past Medical and Surgical History reviewed in Epic today.    MEDICATIONS:  Current Outpatient Medications   Medication Sig Dispense Refill     albuterol (PROAIR HFA/PROVENTIL HFA/VENTOLIN HFA) 108 (90 Base) MCG/ACT inhaler Inhale 2 puffs into the lungs every 4 hours as needed for shortness of breath / dyspnea or wheezing       cefdinir (OMNICEF) 300 MG capsule Take 300 mg by mouth every 12 hours for UTI for 6 Days       clopidogrel (PLAVIX) 75 MG tablet Take 75 mg by mouth daily       escitalopram (LEXAPRO) 5 MG tablet Take 5 mg by mouth daily       ferrous fumarate 65 mg, [place]. FE,-Vitamin C 125 mg (VITRON C)  MG TABS tablet Take 1 tablet by mouth daily       fluticasone-salmeterol (ADVAIR) 500-50 MCG/DOSE inhaler Inhale 1 puff into the lungs every 12 hours for Asthma Rinse and spit after each use.       hydrochlorothiazide (HYDRODIURIL) 25 MG tablet Take 25 mg by mouth daily every other day for blood pressure and fluid managment       isosorbide mononitrate (IMDUR) 30 MG 24 hr tablet Take 30 mg by mouth daily       losartan (COZAAR) 50 MG tablet Take 50 mg by mouth daily        melatonin 3 MG tablet Take 6 mg by mouth At Bedtime       metoprolol tartrate (LOPRESSOR) 25 MG tablet Take 12.5 mg by mouth 2 times daily       nitroGLYcerin (NITROSTAT) 0.4 MG sublingual tablet Place 0.4 mg under the tongue every 5 minutes as needed for chest pain For chest pain place 1 tablet under the tongue every 5 minutes for 3 doses. If symptoms persist 5 minutes after 1st dose call 911.       pantoprazole (PROTONIX) 40 MG EC tablet Take 40 mg by mouth daily for Esophagitis Before breakfast       rosuvastatin (CRESTOR) 5 MG tablet Take 1 tablet (5 mg) by mouth daily       SENNA-docusate sodium (SENNA S) 8.6-50 MG tablet Take 1 tablet by mouth one time a day every 3 day(s) for constipation per patient request AND Give 1 tablet by mouth as needed for constipation BID       Vitamin D, Cholecalciferol, 25 MCG (1000 UT) TABS Take 1 tablet by mouth daily       zolpidem (AMBIEN) 5 MG tablet Take 2.5 mg by mouth nightly as needed for sleep         REVIEW OF SYSTEMS:  4 point ROS including Respiratory, CV, GI and , other than that noted in the HPI,  is negative    Objective:  Exam:  GENERAL APPEARANCE:  Alert, in no distress, pleasant, cooperative. Hard of hearing.   RESP: no respiratory distress, patient is on room air  CV: . Edema 1+ in bilateral lower extremities.  M/S:   Gait and station: ambulates with walker, able to move all extremities   SKIN:  Inspection and palpation of skin and subcutaneous tissue: skin warm, dry and intact without rashes but exam is limited  NEURO: no facial asymmetry, no speech deficits and able to follow directions, moves all extremities symmetrically  PSYCH:  insight, judgement, and memory intact, affect and mood normal    Labs:   Reviewed     ASSESSMENT/PLAN:.   Diastolic HF  CAD  Hypertensive heart disease  Hydrochlorothiazide increased to daily administration after chest xrays showed venous congestion in the hospital but she was discharged with every other day and blood  pressures have been 120-140s so this was left as scheduled every other day. Has some mild lower extremity edema from sitting in the dependent positioning in which tubi  were ordered earlier this week but not in place today so will check with nursing regarding the stockings.   --continue with tubigrips on in the a.m and off at HS.   --continue with plavix 75 mg daily  --hydrochlorothiazide 25 mg every other daily  --losartan 50 mg daily  --isosorbide 30 mg daily  --Continue to monitor blood pressure and adjust medications as needed.     Atrial fibrillation  HR 70-80s. Started on metoprolol while in the hospital.   --continue with metoprolol 12.5 mg BID  --monitor HR and adjust medications as needed.      Asthma   No wheezing on exam today  --continue with advair 1 puff BID  --albuterol PRN     Left pontine CVA  Has vision changes on the right from the cva. Also has some intermittent drooling in which she is working with speech therapy for.   --follow clinically  --rosuvastatin 5 mg daily.   --SLP following for the drooling.      Hx of esophagitis  Restarted on PPI during hospitalization as she was encouraged to take this indefinately. HGB decreased to 10 down from 11.2 and recheck at TCU was 11.2.   --continue with iron and vitamin C supplement  --daily pantaprazole 40 mg daily  --HGB if clinically indicated.      Insomnia  Slept well for the past two nights.  --melatonin 6 mg at HS  --ambien 2.5 mg daily PRN but parameters not to administer after 2300.     Physical deconditioning  Secondary to recent hospitalization and underlying medical conditions.   --ongoing PT/OT for strengthening.       Electronically signed by:  AZAEL Austin CNP             Sincerely,        AZAEL Austin CNP

## 2020-08-18 ENCOUNTER — VIRTUAL VISIT (OUTPATIENT)
Dept: GERIATRICS | Facility: CLINIC | Age: 85
End: 2020-08-18
Payer: MEDICARE

## 2020-08-18 VITALS
OXYGEN SATURATION: 97 % | DIASTOLIC BLOOD PRESSURE: 60 MMHG | RESPIRATION RATE: 18 BRPM | HEIGHT: 61 IN | BODY MASS INDEX: 24.17 KG/M2 | SYSTOLIC BLOOD PRESSURE: 138 MMHG | WEIGHT: 128 LBS | HEART RATE: 60 BPM | TEMPERATURE: 97.9 F

## 2020-08-18 DIAGNOSIS — R54 FRAILTY: Primary | ICD-10-CM

## 2020-08-18 DIAGNOSIS — I25.10 CORONARY ARTERY DISEASE INVOLVING NATIVE CORONARY ARTERY OF NATIVE HEART WITHOUT ANGINA PECTORIS: ICD-10-CM

## 2020-08-18 DIAGNOSIS — R40.0 DAYTIME SLEEPINESS: ICD-10-CM

## 2020-08-18 DIAGNOSIS — Z87.440 PERSONAL HISTORY OF URINARY TRACT INFECTION: ICD-10-CM

## 2020-08-18 DIAGNOSIS — R29.6 FALLS FREQUENTLY: ICD-10-CM

## 2020-08-18 DIAGNOSIS — I67.9 CEREBROVASCULAR DISEASE: ICD-10-CM

## 2020-08-18 DIAGNOSIS — F32.9 MAJOR DEPRESSIVE DISORDER, REMISSION STATUS UNSPECIFIED, UNSPECIFIED WHETHER RECURRENT: ICD-10-CM

## 2020-08-18 DIAGNOSIS — I48.91 ATRIAL FIBRILLATION AND FLUTTER (H): ICD-10-CM

## 2020-08-18 DIAGNOSIS — I48.92 ATRIAL FIBRILLATION AND FLUTTER (H): ICD-10-CM

## 2020-08-18 DIAGNOSIS — J45.909 UNCOMPLICATED ASTHMA, UNSPECIFIED ASTHMA SEVERITY, UNSPECIFIED WHETHER PERSISTENT: ICD-10-CM

## 2020-08-18 DIAGNOSIS — K20.90 ESOPHAGITIS: ICD-10-CM

## 2020-08-18 DIAGNOSIS — D50.9 IRON DEFICIENCY ANEMIA, UNSPECIFIED IRON DEFICIENCY ANEMIA TYPE: ICD-10-CM

## 2020-08-18 DIAGNOSIS — G47.00 INSOMNIA, UNSPECIFIED TYPE: ICD-10-CM

## 2020-08-18 PROCEDURE — 99306 1ST NF CARE HIGH MDM 50: CPT | Mod: 95 | Performed by: INTERNAL MEDICINE

## 2020-08-18 RX ORDER — LIDOCAINE 50 MG/G
1 PATCH TOPICAL DAILY PRN
COMMUNITY

## 2020-08-18 ASSESSMENT — MIFFLIN-ST. JEOR: SCORE: 942.98

## 2020-08-18 NOTE — PROGRESS NOTES
" [facility]  INITIAL PHYSICIAN VISIT    Ermelinda Pfeiffer is being evaluated via a billable video visit due to the restrictions of the Covid-19 pandemic.   The patient has been notified of following:\"This video visit will be conducted via a call between you and your provider. We have found that certain health care needs can be provided without the need for an in-person physical exam.  This service lets us provide the care you need with a video conversation. If during the course of the call the provider feels a video visit is not appropriate, you will not be charged for this service.\"   The provider has received verbal consent for a Video Visit from the patient and or first contact? Yes  Received verbal consent to use Care Everywhere in order to access labs, documents, histories, and all other needed information to provide care at current facility.    Video Start Time: 9:03 AM  Which Facility the Patient is at during the time of visit: [facility]     PRIMARY CARE PROVIDER AND CLINIC:  Dr. Tawny Goodson at [facility]  Chief Complaint   Patient presents with     Hospital F/U     Video Visit     [facility] Medical Record Number:  [id number]  Ermelinda Pfeiffer  is a 89 year old  (10/19/1930), admitted to the above facility from  [facility]. Hospital stay 7/27/20 through 8/1/20. Admitted to this facility for  rehab, medical management and nursing care.  HPI:    HPI information obtained from: facility chart records, facility staff, patient report, [facility] chart review and Care Everywhere [facility] chart review.   Brief Summary of Hospital Course: Ermelinda Pfeiffer has h/o CAD (stents in 1990s), afib/flutter not on anti-coagulation, left pontine CVA in 2019, depression, insomnia and progressive frailty admitted from her [facility] with a few falls. No injuries noted and no new acute stroke. She was also noted by PCP prior to admission to be less responsive than her baseline. No specific " "etiology noted during hospital stay though small adjustments to her regimen noted prior to transition to TCU; ultimately thought per discharge summary generalized weakness d/t multifactorial causes and deconditioning. Metoprolol was added for rapid afib/flutter and she was treated for UTI with Macrobid and then transitioned to Cefdinir d/t leukocytosis. PPI also started d/t reports of \"dark stools\" despite on-going iron supplementation and underlying h/o esophagitis; Hgb in 10-11 range while in hospital. There was also discussion with her in the hospital re: risks of falls with her PRN Ambien but she wished to continue this. Outpatient [facility] f/u virtually arranged.    Updates on Status Since Skilled Nursing Admission: Ermelinda is seen in her room today via video visit. She has already done some therapies though is now in bed and c/o being tired. Says she stayed up til 12:30 AM watching the convention on TV. Its noted she did NOT take her PRN Melatonin last evening but she did take her PRN 2.5 mg Ambien at 11 PM. She has taken Ambien only 4 times since admit to TCU; also intermittently taken the PRN Melatonin this stay but often takes it about 9-10 PM when she does. We talk about potential hang-over effect of Ambien and she recalls this was discussed in the hospital and makes reference to \"what is the greater good - sleep at night or drowsiness during the day\" and in her mind its better to have a good nights sleep. She perseverates on this a bit. She didn't realize she had a phone visit with [facility] this afternoon to discuss further. Says Ambien helps her sleep soundly. She did get up at 5 AM and had help to the bathroom and then went back to sleep. Physical therapist who is in the room agrees that Ermelinda is intermittently sleepy during the day. Ermelinda feels generally weak as well. Only pain is some mild pain on bilateral heels; currently resting on pillow - see below. Denies chest pain but has ok " "order to have PRN nitroglycerin at her bedside as she worries when she'd need it that staff wouldn't get to her quickly enough. Says she takes PRN nitroglycerin about every 4 months on average at home. Only has dyspnea with prolonged walks. No reports of GI upset. We also discussed if she's had code status discussions with her family to which she replied, \"yes, I want to be resuscitated\".  I spoke with her RN today who says that Ermelinda has been oriented and pretty sharp this stay but its her first day working with her. Says that Ermelinda is often up for meals but otherwise prefers to be in bed. Discussed monitoring heels; currently no sores. RN says that Ermelinda also seems to know her medications well. No specific RN concerns.    CODE STATUS/ADVANCE DIRECTIVES DISCUSSION:   CPR/Full code   Patient's living condition: lives in an assisted living facility  ALLERGIES: Sulfamethoxazole-trimethoprim; Levofloxacin; Clarithromycin; Mirtazapine; and Penicillin g  PAST MEDICAL HISTORY:  has a past medical history of Acute sinus infection, Anemia, CAD (coronary artery disease), Cancer of the skin, basal cell, Cataract, Depressive disorder, Diplopia, Disturbance, sleep, GERD (gastroesophageal reflux disease), HTN (hypertension), Hyperlipidemia, Insomnia, Myocardial infarction (H), Osteoporosis, Skin cancer, Status post parathyroidectomy (05/23/2016), Stone, kidney, and Transient ischemic attack.  PAST SURGICAL HISTORY:   has a past surgical history that includes Parathyroidectomy (1969) and PERCUTANEOUS TRANSLUMINAL BALLOON ANGIOPLASTY WITH INSERTION OF STENT INTO CORONARY ARTERY N/A  (10/29/1997).  FAMILY HISTORY: family history includes Suicide in her maternal aunt and maternal uncle.  SOCIAL HISTORY:   reports that she has quit smoking. She has a 3.25 pack-year smoking history. She has never used smokeless tobacco. She reports that she does not drink alcohol or use drugs.     Reviewed and updated Epic according to facility " MAR  Current Outpatient Medications   Medication Sig Dispense Refill     albuterol (PROAIR HFA/PROVENTIL HFA/VENTOLIN HFA) 108 (90 Base) MCG/ACT inhaler Inhale 2 puffs into the lungs every 4 hours as needed for shortness of breath / dyspnea or wheezing       clopidogrel (PLAVIX) 75 MG tablet Take 75 mg by mouth daily       escitalopram (LEXAPRO) 5 MG tablet Take 5 mg by mouth daily       ferrous fumarate 65 mg, [place]. FE,-Vitamin C 125 mg (VITRON C)  MG TABS tablet Take 1 tablet by mouth daily       fluticasone-salmeterol (ADVAIR) 500-50 MCG/DOSE inhaler Inhale 1 puff into the lungs every 12 hours for Asthma Rinse and spit after each use.       hydrochlorothiazide (HYDRODIURIL) 25 MG tablet Take 25 mg by mouth daily every other day for blood pressure and fluid managment       isosorbide mononitrate (IMDUR) 30 MG 24 hr tablet Take 30 mg by mouth daily       lidocaine (LIDODERM) 5 % patch Place 1 patch onto the skin daily as needed for moderate pain To prevent lidocaine toxicity, patient should be patch free for 12 hrs daily.       losartan (COZAAR) 50 MG tablet Take 50 mg by mouth daily       melatonin 3 MG tablet Take 6 mg by mouth nightly as needed        metoprolol tartrate (LOPRESSOR) 25 MG tablet Take 12.5 mg by mouth 2 times daily       nitroGLYcerin (NITROSTAT) 0.4 MG sublingual tablet Place 0.4 mg under the tongue every 5 minutes as needed for chest pain For chest pain place 1 tablet under the tongue every 5 minutes for 3 doses. If symptoms persist 5 minutes after 1st dose call 911.       pantoprazole (PROTONIX) 40 MG EC tablet Take 40 mg by mouth daily for Esophagitis Before breakfast       rosuvastatin (CRESTOR) 5 MG tablet Take 5 mg by mouth every other day At Bedtime       SENNA-docusate sodium (SENNA S) 8.6-50 MG tablet Take 1 tablet by mouth every 3 day(s) for constipation per patient request AND Give 1 tablet by mouth as needed for constipation BID       Vitamin D, Cholecalciferol, 25 MCG (1000  "UT) TABS Take 1 tablet by mouth daily       zolpidem (AMBIEN) 5 MG tablet Take 2.5 mg by mouth nightly as needed for sleep          ROS: Detailed as above    Vitals:/60   Pulse 60   Temp 97.9  F (36.6  C)   Resp 18   Ht 1.549 m (5' 1\")   Wt 58.1 kg (128 lb)   SpO2 97%   BMI 24.19 kg/m     Limited Visit Exam done given COVID-19 precautions:  Lying in bed in NAD  No scleral icterus; noted strabismus  Appears very frail and deconditioned  Breathing non-labored, no cough  No edema  Bilateral heels examined and slightly dry but no erythema or sores  No tremor  Speech is slow and trending towards slightly mumbled at times as she is admittedly sleepy, though easily re-directable and able to participate in conversation  Oriented to self and surroundings and last night's TV convention     Lab/Diagnostic data:  Labs per TCU on 8/5/20: BMP within normal limits, WBC 7.7, Hgb 11.2, Platelets 363    ASSESSMENT/PLAN:  Frailty  Falls frequently  No specific etiology identified in hospital; likely multifactorial with comorbidities and/or meds  Occupational therapy and physical therapy eval and treat for physical and cognitive abilities    Insomnia, unspecified type  Daytime sleepiness  Major depressive disorder, remission status unspecified, unspecified whether recurrent  She is going to be having [facility] virtual visit this afternoon on telephone; hope to hear results of that visit and won't make changes today as a result  Ideas though potentially to improve this would be:   --->Could change Lexapro to evening administration to see if that lessens daytime sleepiness   --->Would consider giving Melatonin with supper as it takes several hours often to have its effects   --->Minimize Ambien and hope to discontinue it though realize its hard for her to part with it; if keeping it, could move back latest time administration of the evening to an earlier time than currently at 11 pm (ex: 8 or 9 pm) and could track times " she has taken Ambien to compare to how her energy level is the next day   --->Big thing here is sleep hygiene; would promote as much time out of bed as possible and setting routines during datime   --->Ok for short nap during day but minimize this to promote good sleep at night   --->Trazodone historically had excessive daytime sleepiness as s/e; consider trial of Remeron 7.5 mg instead of Ambien   --->Goal being decent sleep without excessive daytime sleepiness    Personal history of urinary tract infection  Completed Cefdinir course here at TCU and no on-going urinary complaints voiced today    Cerebrovascular disease  H/o ischemic left pontine stroke in 2019 and on Plavix, Losartan, Hydrochlorothiazide and Crestor    Coronary artery disease involving native coronary artery of native heart without angina pectoris  Atrial fibrillation and flutter (H)  VSS on current regimen  Metoprolol 12.5 mg BID started in hospital for rapid afib/flutter  Other cardiac meds as noted above  Not on anticoagulation as outpatient despite h/o CVA (?probably because of falls risk); is on Plavix for h/o CVA  No recent chest pain; has PRN nitroglycerin available    Esophagitis  Iron deficiency anemia, unspecified iron deficiency anemia type  On PPI and iron with decent Hgb  Dark stools noted in hospital could be d/t iron tablets  Suspect it would be a challenge for invasive GI procedures but defer to PCP as currently stable and not necessary at this time    Uncomplicated asthma, unspecified asthma severity, unspecified whether persistent  Continue maintenance Advair; no PRN Albuterol needed yet this TCU stay     Bilateral heel pain  Visualized on exam and discussed with RN today  No open sores/erythema but some dry skin  She is in bed a lot but also does get up to wheelchair for meals  Not a chronic complaint; addressed earlier today just prior to my visit with floating heels on pillows  Discussed together with RN for her to work with  Ermelinda on this; ok to reposition, offer prophylactic mepilex and/or heel boots for comfort/protection      Electronically signed by:  Myrtle Quinonez DO     Video-Visit Details  Type of service:  Video Visit  Video End Time (time video stopped): 9:23 AM  Distant Location (provider location):  [facility]         Total time spent with patient visit was >49 minutes including patient visit (20 minutes), review of past records (>10 minutes including CareEverywhere), discussion with nursing staff (9 minutes 9:48 AM - 9:57 AM) and collaborating with NP colleague (10 minutes) on overall plan of care going forward. Greater than 50% of total time spent with counseling and coordinating care due to insomnia, frailty and polypharmacy.

## 2020-08-18 NOTE — LETTER
"    8/18/2020        RE: Ermelinda Pfeiffer  [address]  [address]  [facility] [address]         [facility]  INITIAL PHYSICIAN VISIT    Ermelinda Pfeiffer is being evaluated via a billable video visit due to the restrictions of the Covid-19 pandemic.   The patient has been notified of following:\"This video visit will be conducted via a call between you and your provider. We have found that certain health care needs can be provided without the need for an in-person physical exam.  This service lets us provide the care you need with a video conversation. If during the course of the call the provider feels a video visit is not appropriate, you will not be charged for this service.\"   The provider has received verbal consent for a Video Visit from the patient and or first contact? Yes  Received verbal consent to use Care Everywhere in order to access labs, documents, histories, and all other needed information to provide care at current facility.    Video Start Time: 9:03 AM  Which Facility the Patient is at during the time of visit: [facility]     PRIMARY CARE PROVIDER AND CLINIC:  Dr. Tawny Goodson at [facility]  Chief Complaint   Patient presents with     Hospital F/U     Video Visit     [place] Medical Record Number:  [id number]  Ermelinda Pfeiffer  is a 89 year old  (10/19/1930), admitted to the above facility from  [facility]. Hospital stay 7/27/20 through 8/1/20. Admitted to this facility for  rehab, medical management and nursing care.  HPI:    HPI information obtained from: facility chart records, facility staff, patient report, [facility] chart review and Care Everywhere [facility] chart review.   Brief Summary of Hospital Course: Ermelinda Pfeiffer has h/o CAD (stents in 1990s), afib/flutter not on anti-coagulation, left pontine CVA in 2019, depression, insomnia and progressive frailty admitted from her [facility] with a few falls. No injuries noted and no new acute stroke. " "She was also noted by PCP prior to admission to be less responsive than her baseline. No specific etiology noted during hospital stay though small adjustments to her regimen noted prior to transition to TCU; ultimately thought per discharge summary generalized weakness d/t multifactorial causes and deconditioning. Metoprolol was added for rapid afib/flutter and she was treated for UTI with Macrobid and then transitioned to Cefdinir d/t leukocytosis. PPI also started d/t reports of \"dark stools\" despite on-going iron supplementation and underlying h/o esophagitis; Hgb in 10-11 range while in hospital. There was also discussion with her in the hospital re: risks of falls with her PRN Ambien but she wished to continue this. Outpatient [facility] f/u virtually arranged.    Updates on Status Since Skilled Nursing Admission: Ermelinda is seen in her room today via video visit. She has already done some therapies though is now in bed and c/o being tired. Says she stayed up til 12:30 AM watching the convention on TV. Its noted she did NOT take her PRN Melatonin last evening but she did take her PRN 2.5 mg Ambien at 11 PM. She has taken Ambien only 4 times since admit to TCU; also intermittently taken the PRN Melatonin this stay but often takes it about 9-10 PM when she does. We talk about potential hang-over effect of Ambien and she recalls this was discussed in the hospital and makes reference to \"what is the greater good - sleep at night or drowsiness during the day\" and in her mind its better to have a good nights sleep. She perseverates on this a bit. She didn't realize she had a phone visit with [facility] this afternoon to discuss further. Says Ambien helps her sleep soundly. She did get up at 5 AM and had help to the bathroom and then went back to sleep. Physical therapist who is in the room agrees that Ermelinda is intermittently sleepy during the day. Ermelinda feels generally weak as well. Only pain is some mild " "pain on bilateral heels; currently resting on pillow - see below. Denies chest pain but has ok order to have PRN nitroglycerin at her bedside as she worries when she'd need it that staff wouldn't get to her quickly enough. Says she takes PRN nitroglycerin about every 4 months on average at home. Only has dyspnea with prolonged walks. No reports of GI upset. We also discussed if she's had code status discussions with her family to which she replied, \"yes, I want to be resuscitated\".  I spoke with her RN today who says that Ermelinda has been oriented and pretty sharp this stay but its her first day working with her. Says that Ermelinda is often up for meals but otherwise prefers to be in bed. Discussed monitoring heels; currently no sores. RN says that Ermelinda also seems to know her medications well. No specific RN concerns.    CODE STATUS/ADVANCE DIRECTIVES DISCUSSION:   CPR/Full code   Patient's living condition: lives in an assisted living facility  ALLERGIES: Sulfamethoxazole-trimethoprim; Levofloxacin; Clarithromycin; Mirtazapine; and Penicillin g  PAST MEDICAL HISTORY:  has a past medical history of Acute sinus infection, Anemia, CAD (coronary artery disease), Cancer of the skin, basal cell, Cataract, Depressive disorder, Diplopia, Disturbance, sleep, GERD (gastroesophageal reflux disease), HTN (hypertension), Hyperlipidemia, Insomnia, Myocardial infarction (H), Osteoporosis, Skin cancer, Status post parathyroidectomy (05/23/2016), Stone, kidney, and Transient ischemic attack.  PAST SURGICAL HISTORY:   has a past surgical history that includes Parathyroidectomy (1969) and PERCUTANEOUS TRANSLUMINAL BALLOON ANGIOPLASTY WITH INSERTION OF STENT INTO CORONARY ARTERY N/A  (10/29/1997).  FAMILY HISTORY: family history includes Suicide in her maternal aunt and maternal uncle.  SOCIAL HISTORY:   reports that she has quit smoking. She has a 3.25 pack-year smoking history. She has never used smokeless tobacco. She reports that she " does not drink alcohol or use drugs.     Reviewed and updated Epic according to facility MAR  Current Outpatient Medications   Medication Sig Dispense Refill     albuterol (PROAIR HFA/PROVENTIL HFA/VENTOLIN HFA) 108 (90 Base) MCG/ACT inhaler Inhale 2 puffs into the lungs every 4 hours as needed for shortness of breath / dyspnea or wheezing       clopidogrel (PLAVIX) 75 MG tablet Take 75 mg by mouth daily       escitalopram (LEXAPRO) 5 MG tablet Take 5 mg by mouth daily       ferrous fumarate 65 mg, [place]. FE,-Vitamin C 125 mg (VITRON C)  MG TABS tablet Take 1 tablet by mouth daily       fluticasone-salmeterol (ADVAIR) 500-50 MCG/DOSE inhaler Inhale 1 puff into the lungs every 12 hours for Asthma Rinse and spit after each use.       hydrochlorothiazide (HYDRODIURIL) 25 MG tablet Take 25 mg by mouth daily every other day for blood pressure and fluid managment       isosorbide mononitrate (IMDUR) 30 MG 24 hr tablet Take 30 mg by mouth daily       lidocaine (LIDODERM) 5 % patch Place 1 patch onto the skin daily as needed for moderate pain To prevent lidocaine toxicity, patient should be patch free for 12 hrs daily.       losartan (COZAAR) 50 MG tablet Take 50 mg by mouth daily       melatonin 3 MG tablet Take 6 mg by mouth nightly as needed        metoprolol tartrate (LOPRESSOR) 25 MG tablet Take 12.5 mg by mouth 2 times daily       nitroGLYcerin (NITROSTAT) 0.4 MG sublingual tablet Place 0.4 mg under the tongue every 5 minutes as needed for chest pain For chest pain place 1 tablet under the tongue every 5 minutes for 3 doses. If symptoms persist 5 minutes after 1st dose call 911.       pantoprazole (PROTONIX) 40 MG EC tablet Take 40 mg by mouth daily for Esophagitis Before breakfast       rosuvastatin (CRESTOR) 5 MG tablet Take 5 mg by mouth every other day At Bedtime       SENNA-docusate sodium (SENNA S) 8.6-50 MG tablet Take 1 tablet by mouth every 3 day(s) for constipation per patient request AND Give 1 tablet  "by mouth as needed for constipation BID       Vitamin D, Cholecalciferol, 25 MCG (1000 UT) TABS Take 1 tablet by mouth daily       zolpidem (AMBIEN) 5 MG tablet Take 2.5 mg by mouth nightly as needed for sleep          ROS: Detailed as above    Vitals:/60   Pulse 60   Temp 97.9  F (36.6  C)   Resp 18   Ht 1.549 m (5' 1\")   Wt 58.1 kg (128 lb)   SpO2 97%   BMI 24.19 kg/m     Limited Visit Exam done given COVID-19 precautions:  Lying in bed in NAD  No scleral icterus; noted strabismus  Appears very frail and deconditioned  Breathing non-labored, no cough  No edema  Bilateral heels examined and slightly dry but no erythema or sores  No tremor  Speech is slow and trending towards slightly mumbled at times as she is admittedly sleepy, though easily re-directable and able to participate in conversation  Oriented to self and surroundings and last night's TV convention     Lab/Diagnostic data:  Labs per TCU on 8/5/20: BMP within normal limits, WBC 7.7, Hgb 11.2, Platelets 363    ASSESSMENT/PLAN:  Frailty  Falls frequently  No specific etiology identified in hospital; likely multifactorial with comorbidities and/or meds  Occupational therapy and physical therapy eval and treat for physical and cognitive abilities    Insomnia, unspecified type  Daytime sleepiness  Major depressive disorder, remission status unspecified, unspecified whether recurrent  She is going to be having [facility] virtual visit this afternoon on telephone; hope to hear results of that visit and won't make changes today as a result  Ideas though potentially to improve this would be:   --->Could change Lexapro to evening administration to see if that lessens daytime sleepiness   --->Would consider giving Melatonin with supper as it takes several hours often to have its effects   --->Minimize Ambien and hope to discontinue it though realize its hard for her to part with it; if keeping it, could move back latest time administration of the " evening to an earlier time than currently at 11 pm (ex: 8 or 9 pm) and could track times she has taken Ambien to compare to how her energy level is the next day   --->Big thing here is sleep hygiene; would promote as much time out of bed as possible and setting routines during datime   --->Ok for short nap during day but minimize this to promote good sleep at night   --->Trazodone historically had excessive daytime sleepiness as s/e; consider trial of Remeron 7.5 mg instead of Ambien   --->Goal being decent sleep without excessive daytime sleepiness    Personal history of urinary tract infection  Completed Cefdinir course here at TCU and no on-going urinary complaints voiced today    Cerebrovascular disease  H/o ischemic left pontine stroke in 2019 and on Plavix, Losartan, Hydrochlorothiazide and Crestor    Coronary artery disease involving native coronary artery of native heart without angina pectoris  Atrial fibrillation and flutter (H)  VSS on current regimen  Metoprolol 12.5 mg BID started in hospital for rapid afib/flutter  Other cardiac meds as noted above  Not on anticoagulation as outpatient despite h/o CVA (?probably because of falls risk); is on Plavix for h/o CVA  No recent chest pain; has PRN nitroglycerin available    Esophagitis  Iron deficiency anemia, unspecified iron deficiency anemia type  On PPI and iron with decent Hgb  Dark stools noted in hospital could be d/t iron tablets  Suspect it would be a challenge for invasive GI procedures but defer to PCP as currently stable and not necessary at this time    Uncomplicated asthma, unspecified asthma severity, unspecified whether persistent  Continue maintenance Advair; no PRN Albuterol needed yet this TCU stay     Bilateral heel pain  Visualized on exam and discussed with RN today  No open sores/erythema but some dry skin  She is in bed a lot but also does get up to wheelchair for meals  Not a chronic complaint; addressed earlier today just prior to my  visit with floating heels on pillows  Discussed together with RN for her to work with Ermelinda on this; ok to reposition, offer prophylactic mepilex and/or heel boots for comfort/protection      Electronically signed by:  Myrtle Quinonez DO     Video-Visit Details  Type of service:  Video Visit  Video End Time (time video stopped): 9:23 AM  Distant Location (provider location):  [facility]         Total time spent with patient visit was >49 minutes including patient visit (20 minutes), review of past records (>10 minutes including CareEverywhere), discussion with nursing staff (9 minutes 9:48 AM - 9:57 AM) and collaborating with NP colleague (10 minutes) on overall plan of care going forward. Greater than 50% of total time spent with counseling and coordinating care due to insomnia, frailty and polypharmacy.          Sincerely,        Myrtle Quinonez DO

## 2020-08-24 ENCOUNTER — NURSING HOME VISIT (OUTPATIENT)
Dept: GERIATRICS | Facility: CLINIC | Age: 85
End: 2020-08-24
Payer: MEDICARE

## 2020-08-24 VITALS
SYSTOLIC BLOOD PRESSURE: 180 MMHG | OXYGEN SATURATION: 95 % | BODY MASS INDEX: 23.77 KG/M2 | HEART RATE: 77 BPM | DIASTOLIC BLOOD PRESSURE: 85 MMHG | TEMPERATURE: 98.3 F | WEIGHT: 125.9 LBS | HEIGHT: 61 IN | RESPIRATION RATE: 16 BRPM

## 2020-08-24 DIAGNOSIS — I25.10 CORONARY ARTERY DISEASE INVOLVING NATIVE CORONARY ARTERY WITHOUT ANGINA PECTORIS, UNSPECIFIED WHETHER NATIVE OR TRANSPLANTED HEART: ICD-10-CM

## 2020-08-24 DIAGNOSIS — I11.9 HYPERTENSIVE HEART DISEASE WITHOUT HEART FAILURE: Primary | ICD-10-CM

## 2020-08-24 DIAGNOSIS — G47.00 INSOMNIA, UNSPECIFIED TYPE: ICD-10-CM

## 2020-08-24 DIAGNOSIS — I48.20 CHRONIC ATRIAL FIBRILLATION (H): ICD-10-CM

## 2020-08-24 DIAGNOSIS — R29.6 FALLS FREQUENTLY: ICD-10-CM

## 2020-08-24 DIAGNOSIS — R54 FRAILTY: ICD-10-CM

## 2020-08-24 DIAGNOSIS — Z86.73 HISTORY OF CVA (CEREBROVASCULAR ACCIDENT): ICD-10-CM

## 2020-08-24 DIAGNOSIS — I50.32 CHRONIC DIASTOLIC CONGESTIVE HEART FAILURE (H): ICD-10-CM

## 2020-08-24 DIAGNOSIS — R53.81 PHYSICAL DECONDITIONING: ICD-10-CM

## 2020-08-24 PROCEDURE — 99309 SBSQ NF CARE MODERATE MDM 30: CPT | Performed by: NURSE PRACTITIONER

## 2020-08-24 RX ORDER — LOSARTAN POTASSIUM 50 MG/1
75 TABLET ORAL DAILY
COMMUNITY
Start: 2020-08-24 | End: 2021-01-01

## 2020-08-24 ASSESSMENT — MIFFLIN-ST. JEOR: SCORE: 933.46

## 2020-08-24 NOTE — PROGRESS NOTES
[place] [facility]  [place] Medical Record Number: [id number]  Place of Service where encounter took place: [facility] ([facility]) [[id number]]  Chief Complaint   Patient presents with     RECHECK       HPI:    Ermelinda Pfeiffer is a 89 year old (10/19/1930), who is being seen today for an episodic care visit. HPI information obtained from: facility chart records, facility staff, patient report and [facility] chart review. Today's concern is:    Seen today working with therapy, up with SBA of 1. Denies pain, chest pain or SOB. Does realize her BP has been elevated for unclear reasons. Weight stable and does not appear in fluid overload. Really wants to return to the AL setting despite discussion of LTC recommendations. Mood and spirits appear overall good. Denies any urinary s/s but was recently treated for UTI. Has a-fib (RRR) today and not on anticoagulation 2/2 falls? Denies GI upset, reflux. On PPI now for life with iron supplement for CHRISTINA. Bowel movements in chart review are daily.       Past Medical and Surgical History reviewed in Epic today.    MEDICATIONS:    Current Outpatient Medications   Medication Sig Dispense Refill     albuterol (PROAIR HFA/PROVENTIL HFA/VENTOLIN HFA) 108 (90 Base) MCG/ACT inhaler Inhale 2 puffs into the lungs every 4 hours as needed for shortness of breath / dyspnea or wheezing       clopidogrel (PLAVIX) 75 MG tablet Take 75 mg by mouth daily       escitalopram (LEXAPRO) 5 MG tablet Take 5 mg by mouth daily       ferrous fumarate 65 mg, [place]. FE,-Vitamin C 125 mg (VITRON C)  MG TABS tablet Take 1 tablet by mouth daily       fluticasone-salmeterol (ADVAIR) 500-50 MCG/DOSE inhaler Inhale 1 puff into the lungs every 12 hours for Asthma Rinse and spit after each use.       hydrochlorothiazide (HYDRODIURIL) 25 MG tablet Take 25 mg by mouth daily every other day for blood pressure and fluid managment       isosorbide mononitrate (IMDUR) 30 MG 24 hr tablet Take 30  "mg by mouth daily       lidocaine (LIDODERM) 5 % patch Place 1 patch onto the skin daily as needed for moderate pain To prevent lidocaine toxicity, patient should be patch free for 12 hrs daily.       losartan (COZAAR) 50 MG tablet Take 50 mg by mouth daily       melatonin 3 MG tablet Take 6 mg by mouth nightly as needed        metoprolol tartrate (LOPRESSOR) 25 MG tablet Take 12.5 mg by mouth 2 times daily       nitroGLYcerin (NITROSTAT) 0.4 MG sublingual tablet Place 0.4 mg under the tongue every 5 minutes as needed for chest pain For chest pain place 1 tablet under the tongue every 5 minutes for 3 doses. If symptoms persist 5 minutes after 1st dose call 911.       pantoprazole (PROTONIX) 40 MG EC tablet Take 40 mg by mouth daily for Esophagitis Before breakfast       rosuvastatin (CRESTOR) 5 MG tablet Take 5 mg by mouth every other day At Bedtime       SENNA-docusate sodium (SENNA S) 8.6-50 MG tablet Take 1 tablet by mouth every 3 day(s) for constipation per patient request AND Give 1 tablet by mouth as needed for constipation BID       Vitamin D, Cholecalciferol, 25 MCG (1000 UT) TABS Take 1 tablet by mouth daily       zolpidem (AMBIEN) 5 MG tablet Take 2.5 mg by mouth nightly as needed for sleep       REVIEW OF SYSTEMS:  4 point ROS including Respiratory, CV, GI and , other than that noted in the HPI,  is negative    Objective:  BP (!) 180/85   Pulse 77   Temp 98.3  F (36.8  C)   Resp 16   Ht 1.549 m (5' 1\")   Wt 57.1 kg (125 lb 14.4 oz)   SpO2 95%   BMI 23.79 kg/m    Exam:  GENERAL APPEARANCE:  Alert, in no distress  ENT:  Mouth and posterior oropharynx normal, moist mucous membranes  EYES:  EOM, conjunctivae, right eye esotropia  RESP:  respiratory effort and palpation of chest normal, lungs clear to auscultation   CV:  Palpation and auscultation of heart done , regular rate and rhythm, no murmur, rub, or gallop  ABDOMEN:  no guarding or rebound  M/S:   kyphotic stance, some generalized weakness " above baseline  SKIN:  intact to visualized areas. Heels not seen today, no pain with palpation of area  NEURO:   Cranial nerves 2-12 are normal tested and grossly at patient's baseline  PSYCH:  normal insight, judgement and memory    Labs:   Reviewed BMP and CBC in [facility]      ASSESSMENT/PLAN:  (I11.9) Hypertensive heart disease without heart failure  (primary encounter diagnosis)  (I50.32) Chronic diastolic congestive heart failure (H)  (I48.20) Chronic atrial fibrillation (H)  (I25.10) Coronary artery disease involving native coronary artery without angina pectoris, unspecified whether native or transplanted heart  (Z86.73) History of CVA (cerebrovascular accident)  Comment: elevated BPs with average staring the middle of last week 168/81 HR 74. Weight stable 125-126 lbs. Tubi  not on.   Plan:   --continue with tubigrips on in the a.m and off at HS.   --continue with plavix 75 mg daily  --hydrochlorothiazide 25 mg every other daily  --losartan 50 mg daily increased to 75 mg po daily starting 8/25  --isosorbide 30 mg daily  --rosuvastatin 5 mg daily  --continue with metoprolol 12.5 mg BID  --nitroglycerin prn and bedside per request   --Continue to monitor blood pressure and adjust medications as needed  --BMP periodically     (R54) Frailty  (R29.6) Falls frequently  (R53.81) Physical deconditioning  Comment: facility is recommending LTC ver AL. Family is looking for additional services in AL setting. Sophia 17/26  Plan:   -continue to work with PT/OT for strength, mobility and balance   -lidocaine patch for pain  -vitamin D for bone supplement     (G47.00) Insomnia, unspecified type  Comment: used prn Zolpidem last on 8/23. Missed her telephone visit with Dr. Villanueva of [place]. Left message for her family after my visit. Did not appear with daytime sleepiness   Plan:  --melatonin 6 mg at HS  --ambien 2.5 mg daily PRN but parameters not to administer after 2300           Electronically signed by:  Hao BAUTISTA  AZAEL Araujo CNP

## 2020-08-24 NOTE — LETTER
8/24/2020        RE: Ermelinda Pfeiffer  [address]  [address]  [facility] [address]        [place] [facility]  [place] Medical Record Number: [id number]  Place of Service where encounter took place: [facility] ([facility]) [[id number]]  Chief Complaint   Patient presents with     RECHECK       HPI:    Ermelinda Pfeiffer is a 89 year old (10/19/1930), who is being seen today for an episodic care visit. HPI information obtained from: facility chart records, facility staff, patient report and [facility] chart review. Today's concern is:    Seen today working with therapy, up with SBA of 1. Denies pain, chest pain or SOB. Does realize her BP has been elevated for unclear reasons. Weight stable and does not appear in fluid overload. Really wants to return to the AL setting despite discussion of LTC recommendations. Mood and spirits appear overall good. Denies any urinary s/s but was recently treated for UTI. Has a-fib (RRR) today and not on anticoagulation 2/2 falls? Denies GI upset, reflux. On PPI now for life with iron supplement for CHRISTINA. Bowel movements in chart review are daily.       Past Medical and Surgical History reviewed in Epic today.    MEDICATIONS:    Current Outpatient Medications   Medication Sig Dispense Refill     albuterol (PROAIR HFA/PROVENTIL HFA/VENTOLIN HFA) 108 (90 Base) MCG/ACT inhaler Inhale 2 puffs into the lungs every 4 hours as needed for shortness of breath / dyspnea or wheezing       clopidogrel (PLAVIX) 75 MG tablet Take 75 mg by mouth daily       escitalopram (LEXAPRO) 5 MG tablet Take 5 mg by mouth daily       ferrous fumarate 65 mg, [place]. FE,-Vitamin C 125 mg (VITRON C)  MG TABS tablet Take 1 tablet by mouth daily       fluticasone-salmeterol (ADVAIR) 500-50 MCG/DOSE inhaler Inhale 1 puff into the lungs every 12 hours for Asthma Rinse and spit after each use.       hydrochlorothiazide (HYDRODIURIL) 25 MG tablet Take 25 mg by mouth daily every other day  "for blood pressure and fluid managment       isosorbide mononitrate (IMDUR) 30 MG 24 hr tablet Take 30 mg by mouth daily       lidocaine (LIDODERM) 5 % patch Place 1 patch onto the skin daily as needed for moderate pain To prevent lidocaine toxicity, patient should be patch free for 12 hrs daily.       losartan (COZAAR) 50 MG tablet Take 50 mg by mouth daily       melatonin 3 MG tablet Take 6 mg by mouth nightly as needed        metoprolol tartrate (LOPRESSOR) 25 MG tablet Take 12.5 mg by mouth 2 times daily       nitroGLYcerin (NITROSTAT) 0.4 MG sublingual tablet Place 0.4 mg under the tongue every 5 minutes as needed for chest pain For chest pain place 1 tablet under the tongue every 5 minutes for 3 doses. If symptoms persist 5 minutes after 1st dose call 911.       pantoprazole (PROTONIX) 40 MG EC tablet Take 40 mg by mouth daily for Esophagitis Before breakfast       rosuvastatin (CRESTOR) 5 MG tablet Take 5 mg by mouth every other day At Bedtime       SENNA-docusate sodium (SENNA S) 8.6-50 MG tablet Take 1 tablet by mouth every 3 day(s) for constipation per patient request AND Give 1 tablet by mouth as needed for constipation BID       Vitamin D, Cholecalciferol, 25 MCG (1000 UT) TABS Take 1 tablet by mouth daily       zolpidem (AMBIEN) 5 MG tablet Take 2.5 mg by mouth nightly as needed for sleep       REVIEW OF SYSTEMS:  4 point ROS including Respiratory, CV, GI and , other than that noted in the HPI,  is negative    Objective:  BP (!) 180/85   Pulse 77   Temp 98.3  F (36.8  C)   Resp 16   Ht 1.549 m (5' 1\")   Wt 57.1 kg (125 lb 14.4 oz)   SpO2 95%   BMI 23.79 kg/m    Exam:  GENERAL APPEARANCE:  Alert, in no distress  ENT:  Mouth and posterior oropharynx normal, moist mucous membranes  EYES:  EOM, conjunctivae, right eye esotropia  RESP:  respiratory effort and palpation of chest normal, lungs clear to auscultation   CV:  Palpation and auscultation of heart done , regular rate and rhythm, no murmur, " rub, or gallop  ABDOMEN:  no guarding or rebound  M/S:   kyphotic stance, some generalized weakness above baseline  SKIN:  intact to visualized areas. Heels not seen today, no pain with palpation of area  NEURO:   Cranial nerves 2-12 are normal tested and grossly at patient's baseline  PSYCH:  normal insight, judgement and memory    Labs:   Reviewed BMP and CBC in [facility]      ASSESSMENT/PLAN:  (I11.9) Hypertensive heart disease without heart failure  (primary encounter diagnosis)  (I50.32) Chronic diastolic congestive heart failure (H)  (I48.20) Chronic atrial fibrillation (H)  (I25.10) Coronary artery disease involving native coronary artery without angina pectoris, unspecified whether native or transplanted heart  (Z86.73) History of CVA (cerebrovascular accident)  Comment: elevated BPs with average staring the middle of last week 168/81 HR 74. Weight stable 125-126 lbs. Tubi  not on.   Plan:   --continue with tubigrips on in the a.m and off at HS.   --continue with plavix 75 mg daily  --hydrochlorothiazide 25 mg every other daily  --losartan 50 mg daily increased to 75 mg po daily starting 8/25  --isosorbide 30 mg daily  --rosuvastatin 5 mg daily  --continue with metoprolol 12.5 mg BID  --nitroglycerin prn and bedside per request   --Continue to monitor blood pressure and adjust medications as needed  --BMP periodically     (R54) Frailty  (R29.6) Falls frequently  (R53.81) Physical deconditioning  Comment: facility is recommending LTC [facility]. Family is looking for additional services in AL setting. Sophia 17/26  Plan:   -continue to work with PT/OT for strength, mobility and balance   -lidocaine patch for pain  -vitamin D for bone supplement     (G47.00) Insomnia, unspecified type  Comment: used prn Zolpidem last on 8/23. Missed her telephone visit with Dr. Villanueva of [place]. Left message for her family after my visit. Did not appear with daytime sleepiness   Plan:  --melatonin 6 mg at HS  --ambien 2.5 mg daily  PRN but parameters not to administer after 2300           Electronically signed by:  AZAEL Martínez CNP         Sincerely,        AZAEL Martínez CNP

## 2020-09-02 ASSESSMENT — MIFFLIN-ST. JEOR: SCORE: 938.45

## 2020-09-03 ENCOUNTER — NURSING HOME VISIT (OUTPATIENT)
Dept: GERIATRICS | Facility: CLINIC | Age: 85
End: 2020-09-03
Payer: MEDICARE

## 2020-09-03 VITALS
BODY MASS INDEX: 23.98 KG/M2 | HEIGHT: 61 IN | HEART RATE: 81 BPM | OXYGEN SATURATION: 97 % | TEMPERATURE: 98.1 F | WEIGHT: 127 LBS | RESPIRATION RATE: 16 BRPM | SYSTOLIC BLOOD PRESSURE: 127 MMHG | DIASTOLIC BLOOD PRESSURE: 64 MMHG

## 2020-09-03 DIAGNOSIS — I50.32 CHRONIC DIASTOLIC CONGESTIVE HEART FAILURE (H): ICD-10-CM

## 2020-09-03 DIAGNOSIS — I25.10 CORONARY ARTERY DISEASE INVOLVING NATIVE CORONARY ARTERY WITHOUT ANGINA PECTORIS, UNSPECIFIED WHETHER NATIVE OR TRANSPLANTED HEART: ICD-10-CM

## 2020-09-03 DIAGNOSIS — I48.20 CHRONIC ATRIAL FIBRILLATION (H): ICD-10-CM

## 2020-09-03 DIAGNOSIS — Z86.73 HISTORY OF CVA (CEREBROVASCULAR ACCIDENT): ICD-10-CM

## 2020-09-03 DIAGNOSIS — R53.81 PHYSICAL DECONDITIONING: ICD-10-CM

## 2020-09-03 DIAGNOSIS — G47.00 INSOMNIA, UNSPECIFIED TYPE: ICD-10-CM

## 2020-09-03 DIAGNOSIS — I11.9 HYPERTENSIVE HEART DISEASE WITHOUT HEART FAILURE: Primary | ICD-10-CM

## 2020-09-03 DIAGNOSIS — K20.90 ESOPHAGITIS: ICD-10-CM

## 2020-09-03 DIAGNOSIS — J45.40 MODERATE PERSISTENT ASTHMA WITHOUT COMPLICATION: ICD-10-CM

## 2020-09-03 PROCEDURE — 99309 SBSQ NF CARE MODERATE MDM 30: CPT | Performed by: NURSE PRACTITIONER

## 2020-09-03 NOTE — LETTER
9/3/2020        RE: Ermelinda Pfeiffer  [address]  [address]  [facility] [address]        [place] [facility]  [place] Medical Record Number:  [id number]  Place of Service where encounter took place:  [facility] ([facility]) [[id number]]  Chief Complaint   Patient presents with     Nursing Home Acute       HPI:    Ermelinda Pfeiffer  is a 89 year old (10/19/1930), who is being seen today for an episodic care visit.  HPI information obtained from: {FGS HPI:842090}. Today's concern is:  {FGS DX:368947}    Past Medical and Surgical History reviewed in Epic today.    MEDICATIONS:  {Providers Please double check the med list (in the plan section >> meds & orders tab) and Discontinue any of the meds flagged by the TC to be discontinued}  Current Outpatient Medications   Medication Sig Dispense Refill     albuterol (PROAIR HFA/PROVENTIL HFA/VENTOLIN HFA) 108 (90 Base) MCG/ACT inhaler Inhale 2 puffs into the lungs every 4 hours as needed for shortness of breath / dyspnea or wheezing       clopidogrel (PLAVIX) 75 MG tablet Take 75 mg by mouth daily       escitalopram (LEXAPRO) 5 MG tablet Take 5 mg by mouth daily       ferrous fumarate 65 mg, [place]. FE,-Vitamin C 125 mg (VITRON C)  MG TABS tablet Take 1 tablet by mouth daily       fluticasone-salmeterol (ADVAIR) 500-50 MCG/DOSE inhaler Inhale 1 puff into the lungs every 12 hours for Asthma Rinse and spit after each use.       hydrochlorothiazide (HYDRODIURIL) 25 MG tablet Take 25 mg by mouth daily every other day for blood pressure and fluid managment       isosorbide mononitrate (IMDUR) 30 MG 24 hr tablet Take 30 mg by mouth daily       lidocaine (LIDODERM) 5 % patch Place 1 patch onto the skin daily as needed for moderate pain To prevent lidocaine toxicity, patient should be patch free for 12 hrs daily.       losartan (COZAAR) 50 MG tablet Take 1.5 tablets (75 mg) by mouth daily       melatonin 3 MG tablet Take 6 mg by mouth nightly as needed    "     metoprolol tartrate (LOPRESSOR) 25 MG tablet Take 12.5 mg by mouth 2 times daily       nitroGLYcerin (NITROSTAT) 0.4 MG sublingual tablet Place 0.4 mg under the tongue every 5 minutes as needed for chest pain For chest pain place 1 tablet under the tongue every 5 minutes for 3 doses. If symptoms persist 5 minutes after 1st dose call 911.       pantoprazole (PROTONIX) 40 MG EC tablet Take 40 mg by mouth daily for Esophagitis Before breakfast       rosuvastatin (CRESTOR) 5 MG tablet Take 5 mg by mouth every other day At Bedtime       SENNA-docusate sodium (SENNA S) 8.6-50 MG tablet Take 1 tablet by mouth every 3 day(s) for constipation per patient request AND Give 1 tablet by mouth as needed for constipation BID       Vitamin D, Cholecalciferol, 25 MCG (1000 UT) TABS Take 1 tablet by mouth daily       zolpidem (AMBIEN) 5 MG tablet Take 2.5 mg by mouth nightly as needed for sleep       ***    REVIEW OF SYSTEMS:  {ROS FGS:797380}    Objective:  /64   Pulse 81   Temp 98.1  F (36.7  C)   Resp 16   Ht 1.549 m (5' 1\")   Wt 57.6 kg (127 lb)   SpO2 97%   BMI 24.00 kg/m    Exam:  {Nursing home physical exam :776335}    Labs:   {fgslab:200219}    ASSESSMENT/PLAN:  {FGS DX:107296}    {fgsorders:410471}  ***    {fgstime1:512950}  Electronically signed by:  Amina Gomez MA ***  {Providers Please double check the med list (in the plan section >> meds & orders tab) and Discontinue any of the meds flagged by the TC to be discontinued}        [facility]  [place] Medical Record Number:  [id number]  Place of Service where encounter took place:  [facility] ([facility]) [[id number]]  Chief Complaint   Patient presents with     Nursing Home Acute       HPI:    Ermelinda Pfeiffer  is a 89 year old (10/19/1930), who is being seen today for an episodic care visit.  HPI information obtained from: facility chart records, facility staff, patient report and [facility] chart review.    Ms. Pfeiffer was " visited today while in her room, up in the wheelchair eating breakfast. She has been sleeping well lately. Asked about the telephone appt she had with her sleep specialist at the [facility] and she stated that it did not occur. Looking in careeverywhere, it appears [facility] called her home number that had been disconnected. She has pain in the left great toe around the toenail. Has long toenails and asks that they be clipped. She is frustrated as she is supposed to be walked once per day and this did not happen until 8 pm last evening.      Past Medical and Surgical History reviewed in Epic today.    MEDICATIONS:  Current Outpatient Medications   Medication Sig Dispense Refill     albuterol (PROAIR HFA/PROVENTIL HFA/VENTOLIN HFA) 108 (90 Base) MCG/ACT inhaler Inhale 2 puffs into the lungs every 4 hours as needed for shortness of breath / dyspnea or wheezing       clopidogrel (PLAVIX) 75 MG tablet Take 75 mg by mouth daily       escitalopram (LEXAPRO) 5 MG tablet Take 5 mg by mouth daily       ferrous fumarate 65 mg, [place]. FE,-Vitamin C 125 mg (VITRON C)  MG TABS tablet Take 1 tablet by mouth daily       fluticasone-salmeterol (ADVAIR) 500-50 MCG/DOSE inhaler Inhale 1 puff into the lungs every 12 hours for Asthma Rinse and spit after each use.       hydrochlorothiazide (HYDRODIURIL) 25 MG tablet Take 25 mg by mouth daily every other day for blood pressure and fluid managment       isosorbide mononitrate (IMDUR) 30 MG 24 hr tablet Take 30 mg by mouth daily       lidocaine (LIDODERM) 5 % patch Place 1 patch onto the skin daily as needed for moderate pain To prevent lidocaine toxicity, patient should be patch free for 12 hrs daily.       losartan (COZAAR) 50 MG tablet Take 1.5 tablets (75 mg) by mouth daily       melatonin 3 MG tablet Take 6 mg by mouth nightly as needed        metoprolol tartrate (LOPRESSOR) 25 MG tablet Take 12.5 mg by mouth 2 times daily       nitroGLYcerin (NITROSTAT) 0.4 MG sublingual tablet Place 0.4  mg under the tongue every 5 minutes as needed for chest pain For chest pain place 1 tablet under the tongue every 5 minutes for 3 doses. If symptoms persist 5 minutes after 1st dose call 911.       pantoprazole (PROTONIX) 40 MG EC tablet Take 40 mg by mouth daily for Esophagitis Before breakfast       rosuvastatin (CRESTOR) 5 MG tablet Take 5 mg by mouth every other day At Bedtime       SENNA-docusate sodium (SENNA S) 8.6-50 MG tablet Take 1 tablet by mouth every 3 day(s) for constipation per patient request AND Give 1 tablet by mouth as needed for constipation BID       Vitamin D, Cholecalciferol, 25 MCG (1000 UT) TABS Take 1 tablet by mouth daily       zolpidem (AMBIEN) 5 MG tablet Take 2.5 mg by mouth nightly as needed for sleep         REVIEW OF SYSTEMS:  4 point ROS including Respiratory, CV, GI and , other than that noted in the HPI,  is negative    Objective:  Exam:  GENERAL APPEARANCE:  Alert, in no distress, pleasant, cooperative. Hard of hearing.   RESP: no respiratory distress, patient is on room air  CV: . Edema none in bilateral lower extremities.  M/S:   Gait and station: ambulates with walker, able to move all extremities   SKIN:  Inspection and palpation of skin and subcutaneous tissue: skin warm, dry and intact without rashes but exam is limited  NEURO: no facial asymmetry, no speech deficits and able to follow directions, moves all extremities symmetrically  PSYCH:  insight, judgement, and memory intact, affect and mood normal    Labs:   Reviewed     ASSESSMENT/PLAN:.   Diastolic HF  CAD  Hypertensive heart disease  Hydrochlorothiazide increased to daily administration after chest xrays showed venous congestion in the hospital but she was discharged with every other day and blood pressures have been 120-140s so this was left as scheduled every other day. No longer has lower extremity edema.   --continue with tubigrips on in the a.m and off at HS.   --continue with plavix 75 mg  daily  --hydrochlorothiazide 25 mg every other daily  --losartan 75 mg daily (increased last week from 50 mg)  --isosorbide 30 mg daily  --Continue to monitor blood pressure and adjust medications as needed.     Atrial fibrillation  HR 70-80s. Started on metoprolol while in the hospital.   --continue with metoprolol 12.5 mg BID  --monitor HR and adjust medications as needed.      Asthma   No wheezing on exam today  --continue with advair 1 puff BID  --albuterol PRN     Left pontine CVA  Has vision changes on the right from the cva. Also has some intermittent drooling in which she worked with speech therapy for. Appears to chew and swallow food without difficulty.   --follow clinically  --rosuvastatin 5 mg daily.   --SLP following for the drooling.      Hx of esophagitis  Restarted on PPI during hospitalization as she was encouraged to take this indefinately. HGB decreased to 10 down from 11.2 and recheck at TCU was 11.2.   --continue with iron and vitamin C supplement  --daily pantaprazole 40 mg daily  --HGB if clinically indicated.      Insomnia  She has been sleeping well.   --melatonin 6 mg at HS  --ambien 2.5 mg daily PRN but parameters not to administer after 2300.     Physical deconditioning  Secondary to recent hospitalization and underlying medical conditions. She completed PT/OT this week and will now be moving up to the LTC next week.   --ongoing PT/OT for strengthening.    Electronically signed by:  AZAEL Austin CNP                 Sincerely,        AZAEL Austin CNP

## 2020-09-03 NOTE — LETTER
9/3/2020        RE: Ermelinda Pfeiffer  [address]  [address]  [facility] [address]        [place] [facility]  [place] Medical Record Number:  [id number]  Place of Service where encounter took place:  [facility] ([facility]) [[id number]]  Chief Complaint   Patient presents with     Nursing Home Acute       HPI:    Ermelinda Pfeiffer  is a 89 year old (10/19/1930), who is being seen today for an episodic care visit.  HPI information obtained from: facility chart records, facility staff, patient report and [facility] chart review.    Ms. Pfeiffer was visited today while in her room, up in the wheelchair eating breakfast. She has been sleeping well lately. Asked about the telephone appt she had with her sleep specialist at the [facility] and she stated that it did not occur. Looking in careeverywhere, it appears [facility] called her home number that had been disconnected. She has pain in the left great toe around the toenail. Has long toenails and asks that they be clipped. She is frustrated as she is supposed to be walked once per day and this did not happen until 8 pm last evening.      Past Medical and Surgical History reviewed in Epic today.    MEDICATIONS:  Current Outpatient Medications   Medication Sig Dispense Refill     albuterol (PROAIR HFA/PROVENTIL HFA/VENTOLIN HFA) 108 (90 Base) MCG/ACT inhaler Inhale 2 puffs into the lungs every 4 hours as needed for shortness of breath / dyspnea or wheezing       clopidogrel (PLAVIX) 75 MG tablet Take 75 mg by mouth daily       escitalopram (LEXAPRO) 5 MG tablet Take 5 mg by mouth daily       ferrous fumarate 65 mg, [place]. FE,-Vitamin C 125 mg (VITRON C)  MG TABS tablet Take 1 tablet by mouth daily       fluticasone-salmeterol (ADVAIR) 500-50 MCG/DOSE inhaler Inhale 1 puff into the lungs every 12 hours for Asthma Rinse and spit after each use.       hydrochlorothiazide (HYDRODIURIL) 25 MG tablet Take 25 mg by mouth daily every other day for blood  pressure and fluid managment       isosorbide mononitrate (IMDUR) 30 MG 24 hr tablet Take 30 mg by mouth daily       lidocaine (LIDODERM) 5 % patch Place 1 patch onto the skin daily as needed for moderate pain To prevent lidocaine toxicity, patient should be patch free for 12 hrs daily.       losartan (COZAAR) 50 MG tablet Take 1.5 tablets (75 mg) by mouth daily       melatonin 3 MG tablet Take 6 mg by mouth nightly as needed        metoprolol tartrate (LOPRESSOR) 25 MG tablet Take 12.5 mg by mouth 2 times daily       nitroGLYcerin (NITROSTAT) 0.4 MG sublingual tablet Place 0.4 mg under the tongue every 5 minutes as needed for chest pain For chest pain place 1 tablet under the tongue every 5 minutes for 3 doses. If symptoms persist 5 minutes after 1st dose call 911.       pantoprazole (PROTONIX) 40 MG EC tablet Take 40 mg by mouth daily for Esophagitis Before breakfast       rosuvastatin (CRESTOR) 5 MG tablet Take 5 mg by mouth every other day At Bedtime       SENNA-docusate sodium (SENNA S) 8.6-50 MG tablet Take 1 tablet by mouth every 3 day(s) for constipation per patient request AND Give 1 tablet by mouth as needed for constipation BID       Vitamin D, Cholecalciferol, 25 MCG (1000 UT) TABS Take 1 tablet by mouth daily       zolpidem (AMBIEN) 5 MG tablet Take 2.5 mg by mouth nightly as needed for sleep         REVIEW OF SYSTEMS:  4 point ROS including Respiratory, CV, GI and , other than that noted in the HPI,  is negative    Objective:  Exam:  GENERAL APPEARANCE:  Alert, in no distress, pleasant, cooperative. Hard of hearing.   RESP: no respiratory distress, patient is on room air  CV: . Edema none in bilateral lower extremities.  M/S:   Gait and station: ambulates with walker, able to move all extremities   SKIN:  Inspection and palpation of skin and subcutaneous tissue: skin warm, dry and intact without rashes but exam is limited  NEURO: no facial asymmetry, no speech deficits and able to follow directions,  moves all extremities symmetrically  PSYCH:  insight, judgement, and memory intact, affect and mood normal    Labs:   Reviewed     ASSESSMENT/PLAN:.   Diastolic HF  CAD  Hypertensive heart disease  Hydrochlorothiazide increased to daily administration after chest xrays showed venous congestion in the hospital but she was discharged with every other day and blood pressures have been 120-140s so this was left as scheduled every other day. No longer has lower extremity edema.   --continue with tubigrips on in the a.m and off at HS.   --continue with plavix 75 mg daily  --hydrochlorothiazide 25 mg every other daily  --losartan 75 mg daily (increased last week from 50 mg)  --isosorbide 30 mg daily  --Continue to monitor blood pressure and adjust medications as needed.     Atrial fibrillation  HR 70-80s. Started on metoprolol while in the hospital.   --continue with metoprolol 12.5 mg BID  --monitor HR and adjust medications as needed.      Asthma   No wheezing on exam today  --continue with advair 1 puff BID  --albuterol PRN     Left pontine CVA  Has vision changes on the right from the cva. Also has some intermittent drooling in which she worked with speech therapy for. Appears to chew and swallow food without difficulty.   --follow clinically  --rosuvastatin 5 mg daily.   --SLP following for the drooling.      Hx of esophagitis  Restarted on PPI during hospitalization as she was encouraged to take this indefinately. HGB decreased to 10 down from 11.2 and recheck at TCU was 11.2.   --continue with iron and vitamin C supplement  --daily pantaprazole 40 mg daily  --HGB if clinically indicated.      Insomnia  She has been sleeping well.   --melatonin 6 mg at HS  --ambien 2.5 mg daily PRN but parameters not to administer after 2300.     Physical deconditioning  Secondary to recent hospitalization and underlying medical conditions. She completed PT/OT this week and will now be moving up to the LTC next week.   --ongoing  PT/OT for strengthening.    Electronically signed by:  AZAEL Austin CNP                 Sincerely,        AZAEL Austin CNP

## 2020-09-03 NOTE — PROGRESS NOTES
[place] [facility]  [place] [facility] Record Number:  [id number]  Place of Service where encounter took place:  [facility] ([facility]) [[id number]]  Chief Complaint   Patient presents with     Nursing Home Acute       HPI:    Ermelinda Pfeiffer  is a 89 year old (10/19/1930), who is being seen today for an episodic care visit.  HPI information obtained from: facility chart records, facility staff, patient report and [facility] chart review.    Ms. Pfeiffer was visited today while in her room, up in the wheelchair eating breakfast. She has been sleeping well lately. Asked about the telephone appt she had with her sleep specialist at the [place] and she stated that it did not occur. Looking in careeverwhere, it appears [place] called her home number that had been disconnected. She has pain in the left great toe around the toenail. Has long toenails and asks that they be clipped. She is frustrated as she is supposed to be walked once per day and this did not happen until 8 pm last evening.      Past Medical and Surgical History reviewed in Epic today.    MEDICATIONS:  Current Outpatient Medications   Medication Sig Dispense Refill     albuterol (PROAIR HFA/PROVENTIL HFA/VENTOLIN HFA) 108 (90 Base) MCG/ACT inhaler Inhale 2 puffs into the lungs every 4 hours as needed for shortness of breath / dyspnea or wheezing       clopidogrel (PLAVIX) 75 MG tablet Take 75 mg by mouth daily       escitalopram (LEXAPRO) 5 MG tablet Take 5 mg by mouth daily       ferrous fumarate 65 mg, [place]. FE,-Vitamin C 125 mg (VITRON C)  MG TABS tablet Take 1 tablet by mouth daily       fluticasone-salmeterol (ADVAIR) 500-50 MCG/DOSE inhaler Inhale 1 puff into the lungs every 12 hours for Asthma Rinse and spit after each use.       hydrochlorothiazide (HYDRODIURIL) 25 MG tablet Take 25 mg by mouth daily every other day for blood pressure and fluid managment       isosorbide mononitrate (IMDUR) 30 MG 24 hr tablet Take 30 mg by mouth  daily       lidocaine (LIDODERM) 5 % patch Place 1 patch onto the skin daily as needed for moderate pain To prevent lidocaine toxicity, patient should be patch free for 12 hrs daily.       losartan (COZAAR) 50 MG tablet Take 1.5 tablets (75 mg) by mouth daily       melatonin 3 MG tablet Take 6 mg by mouth nightly as needed        metoprolol tartrate (LOPRESSOR) 25 MG tablet Take 12.5 mg by mouth 2 times daily       nitroGLYcerin (NITROSTAT) 0.4 MG sublingual tablet Place 0.4 mg under the tongue every 5 minutes as needed for chest pain For chest pain place 1 tablet under the tongue every 5 minutes for 3 doses. If symptoms persist 5 minutes after 1st dose call 911.       pantoprazole (PROTONIX) 40 MG EC tablet Take 40 mg by mouth daily for Esophagitis Before breakfast       rosuvastatin (CRESTOR) 5 MG tablet Take 5 mg by mouth every other day At Bedtime       SENNA-docusate sodium (SENNA S) 8.6-50 MG tablet Take 1 tablet by mouth every 3 day(s) for constipation per patient request AND Give 1 tablet by mouth as needed for constipation BID       Vitamin D, Cholecalciferol, 25 MCG (1000 UT) TABS Take 1 tablet by mouth daily       zolpidem (AMBIEN) 5 MG tablet Take 2.5 mg by mouth nightly as needed for sleep         REVIEW OF SYSTEMS:  4 point ROS including Respiratory, CV, GI and , other than that noted in the HPI,  is negative    Objective:  Exam:  GENERAL APPEARANCE:  Alert, in no distress, pleasant, cooperative. Hard of hearing.   RESP: no respiratory distress, patient is on room air  CV: . Edema none in bilateral lower extremities.  M/S:   Gait and station: ambulates with walker, able to move all extremities   SKIN:  Inspection and palpation of skin and subcutaneous tissue: skin warm, dry and intact without rashes but exam is limited  NEURO: no facial asymmetry, no speech deficits and able to follow directions, moves all extremities symmetrically  PSYCH:  insight, judgement, and memory intact, affect and mood  normal    Labs:   Reviewed     ASSESSMENT/PLAN:.   Diastolic HF  CAD  Hypertensive heart disease  Hydrochlorothiazide increased to daily administration after chest xrays showed venous congestion in the hospital but she was discharged with every other day and blood pressures have been 120-140s so this was left as scheduled every other day. No longer has lower extremity edema.   --continue with tubigrips on in the a.m and off at HS.   --continue with plavix 75 mg daily  --hydrochlorothiazide 25 mg every other daily  --losartan 75 mg daily (increased last week from 50 mg)  --isosorbide 30 mg daily  --Continue to monitor blood pressure and adjust medications as needed.     Atrial fibrillation  HR 70-80s. Started on metoprolol while in the hospital.   --continue with metoprolol 12.5 mg BID  --monitor HR and adjust medications as needed.      Asthma   No wheezing on exam today  --continue with advair 1 puff BID  --albuterol PRN     Left pontine CVA  Has vision changes on the right from the cva. Also has some intermittent drooling in which she worked with speech therapy for. Appears to chew and swallow food without difficulty.   --follow clinically  --rosuvastatin 5 mg daily.   --SLP following for the drooling.      Hx of esophagitis  Restarted on PPI during hospitalization as she was encouraged to take this indefinately. HGB decreased to 10 down from 11.2 and recheck at TCU was 11.2.   --continue with iron and vitamin C supplement  --daily pantaprazole 40 mg daily  --HGB if clinically indicated.      Insomnia  She has been sleeping well.   --melatonin 6 mg at HS  --ambien 2.5 mg daily PRN but parameters not to administer after 2300.     Physical deconditioning  Secondary to recent hospitalization and underlying medical conditions. She completed PT/OT this week and will now be moving up to the LT next week.   --ongoing PT/OT for strengthening.    Electronically signed by:  AZAEL Austin CNP

## 2020-09-16 VITALS
OXYGEN SATURATION: 96 % | WEIGHT: 126.8 LBS | RESPIRATION RATE: 16 BRPM | HEIGHT: 61 IN | DIASTOLIC BLOOD PRESSURE: 63 MMHG | SYSTOLIC BLOOD PRESSURE: 142 MMHG | HEART RATE: 72 BPM | BODY MASS INDEX: 23.94 KG/M2 | TEMPERATURE: 98.5 F

## 2020-09-16 ASSESSMENT — MIFFLIN-ST. JEOR: SCORE: 937.54

## 2020-09-16 NOTE — PROGRESS NOTES
[facility]  [place] Medical Record Number:  [id number]  Place of Service where encounter took place:  [facility]ending sale to [facility] ([facility]) [[id number]]  Chief Complaint   Patient presents with     RECHECK       HPI:    Ermelinda Pfeiffer  is a 89 year old (10/19/1930), who is being seen today for an episodic care visit.  HPI information obtained from: facility chart records, facility staff, patient report and [facility] chart review.    Ms. Pfeiffer was visited today while in her room sitting up in the chair, eating breakfast. She denies pain today but has occasionally had pain in the right toe. Also had itching in the left shoulder which has improved as well. She is eating well. Sleeping well with the exception of last night when she was awake for a couple of hours.      Past Medical and Surgical History reviewed in Epic today.    MEDICATIONS:  Current Outpatient Medications   Medication Sig Dispense Refill     albuterol (PROAIR HFA/PROVENTIL HFA/VENTOLIN HFA) 108 (90 Base) MCG/ACT inhaler Inhale 2 puffs into the lungs every 4 hours as needed for shortness of breath / dyspnea or wheezing       clopidogrel (PLAVIX) 75 MG tablet Take 75 mg by mouth daily       escitalopram (LEXAPRO) 5 MG tablet Take 5 mg by mouth daily       ferrous fumarate 65 mg, [place]. FE,-Vitamin C 125 mg (VITRON C)  MG TABS tablet Take 1 tablet by mouth daily       fluticasone-salmeterol (ADVAIR) 500-50 MCG/DOSE inhaler Inhale 1 puff into the lungs every 12 hours for Asthma Rinse and spit after each use.       hydrochlorothiazide (HYDRODIURIL) 25 MG tablet Take 25 mg by mouth daily every other day for blood pressure and fluid managment       isosorbide mononitrate (IMDUR) 30 MG 24 hr tablet Take 30 mg by mouth daily       lidocaine (LIDODERM) 5 % patch Place 1 patch onto the skin daily as needed for moderate pain To prevent lidocaine toxicity, patient should be patch free for 12 hrs daily.       losartan (COZAAR) 50 MG tablet  Take 1.5 tablets (75 mg) by mouth daily       melatonin 3 MG tablet Take 6 mg by mouth nightly as needed        metoprolol tartrate (LOPRESSOR) 25 MG tablet Take 12.5 mg by mouth 2 times daily       nitroGLYcerin (NITROSTAT) 0.4 MG sublingual tablet Place 0.4 mg under the tongue every 5 minutes as needed for chest pain For chest pain place 1 tablet under the tongue every 5 minutes for 3 doses. If symptoms persist 5 minutes after 1st dose call 911.       pantoprazole (PROTONIX) 40 MG EC tablet Take 40 mg by mouth daily for Esophagitis Before breakfast       rosuvastatin (CRESTOR) 5 MG tablet Take 5 mg by mouth every other day At Bedtime       SENNA-docusate sodium (SENNA S) 8.6-50 MG tablet Take 1 tablet by mouth every 3 day(s) for constipation per patient request AND Give 1 tablet by mouth as needed for constipation BID       Vitamin D, Cholecalciferol, 25 MCG (1000 UT) TABS Take 1 tablet by mouth daily       zolpidem (AMBIEN) 5 MG tablet Take 2.5 mg by mouth nightly as needed for sleep         REVIEW OF SYSTEMS:  4 point ROS including Respiratory, CV, GI and , other than that noted in the HPI,  is negative    Objective:  Exam:  GENERAL APPEARANCE:  Alert, in no distress, pleasant, cooperative. Hard of hearing.   RESP: no respiratory distress, patient is on room air  CV: . Edema none in bilateral lower extremities.  M/S:   Gait and station: ambulates with walker, able to move all extremities   SKIN:  Inspection and palpation of skin and subcutaneous tissue: skin warm, dry and intact without rashes but exam is limited  NEURO: no facial asymmetry, no speech deficits and able to follow directions, moves all extremities symmetrically  PSYCH:  insight, judgement, and memory intact, affect and mood normal    Labs:   Reviewed     ASSESSMENT/PLAN:.   Diastolic HF  CAD  Hypertensive heart disease  Hydrochlorothiazide increased to daily administration after chest xrays showed venous congestion in the hospital but she was  discharged with every other day administration which was continued as blood pressures have been 130-140s. euvolemic on exam.   --continue with tubigrips on in the a.m and off at HS.   --continue with plavix 75 mg daily  --hydrochlorothiazide 25 mg every other daily  --losartan 75 mg daily  --isosorbide 30 mg daily  --Continue to monitor blood pressure and adjust medications as needed.     Atrial fibrillation  HR 70-80s. Started on metoprolol while in the hospital.   --continue with metoprolol 12.5 mg BID  --monitor HR and adjust medications as needed.      Asthma   No wheezing on exam today  --continue with advair 1 puff BID  --albuterol PRN     Left pontine CVA  Has vision changes on the right from the cva. Also has some intermittent drooling in which she worked with speech therapy for. Appears to chew and swallow food without difficulty.   --follow clinically  --rosuvastatin 5 mg daily.      Hx of esophagitis  HGB at TCU was 11.2 which is baseline.   --continue with iron and vitamin C supplement  --daily pantaprazole 40 mg daily  --HGB if clinically indicated.      Insomnia  She has been sleeping well.   --melatonin 6 mg at HS  --ambien 2.5 mg daily PRN but parameters not to administer after 2300.     Electronically signed by:  AZAEL Austin CNP

## 2020-09-17 ENCOUNTER — NURSING HOME VISIT (OUTPATIENT)
Dept: GERIATRICS | Facility: CLINIC | Age: 85
End: 2020-09-17
Payer: MEDICARE

## 2020-09-17 DIAGNOSIS — Z86.73 HISTORY OF CVA (CEREBROVASCULAR ACCIDENT): ICD-10-CM

## 2020-09-17 DIAGNOSIS — J45.40 MODERATE PERSISTENT ASTHMA WITHOUT COMPLICATION: ICD-10-CM

## 2020-09-17 DIAGNOSIS — K20.90 ESOPHAGITIS: ICD-10-CM

## 2020-09-17 DIAGNOSIS — I11.9 HYPERTENSIVE HEART DISEASE WITHOUT HEART FAILURE: Primary | ICD-10-CM

## 2020-09-17 DIAGNOSIS — G47.00 INSOMNIA, UNSPECIFIED TYPE: ICD-10-CM

## 2020-09-17 DIAGNOSIS — I50.32 CHRONIC DIASTOLIC CONGESTIVE HEART FAILURE (H): ICD-10-CM

## 2020-09-17 DIAGNOSIS — I25.10 CORONARY ARTERY DISEASE INVOLVING NATIVE CORONARY ARTERY WITHOUT ANGINA PECTORIS, UNSPECIFIED WHETHER NATIVE OR TRANSPLANTED HEART: ICD-10-CM

## 2020-09-17 DIAGNOSIS — I48.20 CHRONIC ATRIAL FIBRILLATION (H): ICD-10-CM

## 2020-09-17 PROCEDURE — 99309 SBSQ NF CARE MODERATE MDM 30: CPT | Performed by: NURSE PRACTITIONER

## 2020-09-17 NOTE — LETTER
9/17/2020        RE: Ermelinda Pfeiffer  [address]  [address]  [facility] [address]        [facility]  [place] [facility] Record Number:  [id number]  Place of Service where encounter took place:  [facility] ([facility]) [[id number]]  Chief Complaint   Patient presents with     RECHECK       HPI:    Ermelinda Pfeiffer  is a 89 year old (10/19/1930), who is being seen today for an episodic care visit.  HPI information obtained from: facility chart records, facility staff, patient report and [facility] chart review.    Ms. Pfeiffer was visited today while in her room sitting up in the chair, eating breakfast. She denies pain today but has occasionally had pain in the right toe. Also had itching in the left shoulder which has improved as well. She is eating well. Sleeping well with the exception of last night when she was awake for a couple of hours.      Past Medical and Surgical History reviewed in Epic today.    MEDICATIONS:  Current Outpatient Medications   Medication Sig Dispense Refill     albuterol (PROAIR HFA/PROVENTIL HFA/VENTOLIN HFA) 108 (90 Base) MCG/ACT inhaler Inhale 2 puffs into the lungs every 4 hours as needed for shortness of breath / dyspnea or wheezing       clopidogrel (PLAVIX) 75 MG tablet Take 75 mg by mouth daily       escitalopram (LEXAPRO) 5 MG tablet Take 5 mg by mouth daily       ferrous fumarate 65 mg, [place]. FE,-Vitamin C 125 mg (VITRON C)  MG TABS tablet Take 1 tablet by mouth daily       fluticasone-salmeterol (ADVAIR) 500-50 MCG/DOSE inhaler Inhale 1 puff into the lungs every 12 hours for Asthma Rinse and spit after each use.       hydrochlorothiazide (HYDRODIURIL) 25 MG tablet Take 25 mg by mouth daily every other day for blood pressure and fluid managment       isosorbide mononitrate (IMDUR) 30 MG 24 hr tablet Take 30 mg by mouth daily       lidocaine (LIDODERM) 5 % patch Place 1 patch onto the skin daily as needed for moderate pain To prevent  lidocaine toxicity, patient should be patch free for 12 hrs daily.       losartan (COZAAR) 50 MG tablet Take 1.5 tablets (75 mg) by mouth daily       melatonin 3 MG tablet Take 6 mg by mouth nightly as needed        metoprolol tartrate (LOPRESSOR) 25 MG tablet Take 12.5 mg by mouth 2 times daily       nitroGLYcerin (NITROSTAT) 0.4 MG sublingual tablet Place 0.4 mg under the tongue every 5 minutes as needed for chest pain For chest pain place 1 tablet under the tongue every 5 minutes for 3 doses. If symptoms persist 5 minutes after 1st dose call 911.       pantoprazole (PROTONIX) 40 MG EC tablet Take 40 mg by mouth daily for Esophagitis Before breakfast       rosuvastatin (CRESTOR) 5 MG tablet Take 5 mg by mouth every other day At Bedtime       SENNA-docusate sodium (SENNA S) 8.6-50 MG tablet Take 1 tablet by mouth every 3 day(s) for constipation per patient request AND Give 1 tablet by mouth as needed for constipation BID       Vitamin D, Cholecalciferol, 25 MCG (1000 UT) TABS Take 1 tablet by mouth daily       zolpidem (AMBIEN) 5 MG tablet Take 2.5 mg by mouth nightly as needed for sleep         REVIEW OF SYSTEMS:  4 point ROS including Respiratory, CV, GI and , other than that noted in the HPI,  is negative    Objective:  Exam:  GENERAL APPEARANCE:  Alert, in no distress, pleasant, cooperative. Hard of hearing.   RESP: no respiratory distress, patient is on room air  CV: . Edema none in bilateral lower extremities.  M/S:   Gait and station: ambulates with walker, able to move all extremities   SKIN:  Inspection and palpation of skin and subcutaneous tissue: skin warm, dry and intact without rashes but exam is limited  NEURO: no facial asymmetry, no speech deficits and able to follow directions, moves all extremities symmetrically  PSYCH:  insight, judgement, and memory intact, affect and mood normal    Labs:   Reviewed     ASSESSMENT/PLAN:.   Diastolic HF  CAD  Hypertensive heart disease  Hydrochlorothiazide  increased to daily administration after chest xrays showed venous congestion in the hospital but she was discharged with every other day administration which was continued as blood pressures have been 130-140s. euvolemic on exam.   --continue with tubigrips on in the a.m and off at HS.   --continue with plavix 75 mg daily  --hydrochlorothiazide 25 mg every other daily  --losartan 75 mg daily  --isosorbide 30 mg daily  --Continue to monitor blood pressure and adjust medications as needed.     Atrial fibrillation  HR 70-80s. Started on metoprolol while in the hospital.   --continue with metoprolol 12.5 mg BID  --monitor HR and adjust medications as needed.      Asthma   No wheezing on exam today  --continue with advair 1 puff BID  --albuterol PRN     Left pontine CVA  Has vision changes on the right from the cva. Also has some intermittent drooling in which she worked with speech therapy for. Appears to chew and swallow food without difficulty.   --follow clinically  --rosuvastatin 5 mg daily.      Hx of esophagitis  HGB at TCU was 11.2 which is baseline.   --continue with iron and vitamin C supplement  --daily pantaprazole 40 mg daily  --HGB if clinically indicated.      Insomnia  She has been sleeping well.   --melatonin 6 mg at HS  --ambien 2.5 mg daily PRN but parameters not to administer after 2300.     Electronically signed by:  AZAEL Austin CNP                 Sincerely,        AZAEL Austin CNP

## 2020-09-27 ASSESSMENT — MIFFLIN-ST. JEOR: SCORE: 962.94

## 2020-09-28 ENCOUNTER — NURSING HOME VISIT (OUTPATIENT)
Dept: GERIATRICS | Facility: CLINIC | Age: 85
End: 2020-09-28
Payer: MEDICARE

## 2020-09-28 VITALS
DIASTOLIC BLOOD PRESSURE: 64 MMHG | HEART RATE: 72 BPM | WEIGHT: 132.4 LBS | SYSTOLIC BLOOD PRESSURE: 147 MMHG | HEIGHT: 61 IN | OXYGEN SATURATION: 97 % | RESPIRATION RATE: 18 BRPM | TEMPERATURE: 97.5 F | BODY MASS INDEX: 25 KG/M2

## 2020-09-28 DIAGNOSIS — I25.10 CORONARY ARTERY DISEASE INVOLVING NATIVE CORONARY ARTERY WITHOUT ANGINA PECTORIS, UNSPECIFIED WHETHER NATIVE OR TRANSPLANTED HEART: ICD-10-CM

## 2020-09-28 DIAGNOSIS — I48.20 CHRONIC ATRIAL FIBRILLATION (H): ICD-10-CM

## 2020-09-28 DIAGNOSIS — R53.81 PHYSICAL DECONDITIONING: ICD-10-CM

## 2020-09-28 DIAGNOSIS — G47.00 INSOMNIA, UNSPECIFIED TYPE: Primary | ICD-10-CM

## 2020-09-28 DIAGNOSIS — I11.9 HYPERTENSIVE HEART DISEASE WITHOUT HEART FAILURE: ICD-10-CM

## 2020-09-28 DIAGNOSIS — J45.40 MODERATE PERSISTENT ASTHMA WITHOUT COMPLICATION: ICD-10-CM

## 2020-09-28 DIAGNOSIS — E78.49 OTHER HYPERLIPIDEMIA: ICD-10-CM

## 2020-09-28 DIAGNOSIS — F32.9 MAJOR DEPRESSIVE DISORDER, REMISSION STATUS UNSPECIFIED, UNSPECIFIED WHETHER RECURRENT: ICD-10-CM

## 2020-09-28 DIAGNOSIS — K20.90 ESOPHAGITIS: ICD-10-CM

## 2020-09-28 DIAGNOSIS — Z86.73 HISTORY OF CVA (CEREBROVASCULAR ACCIDENT): ICD-10-CM

## 2020-09-28 DIAGNOSIS — I50.32 CHRONIC DIASTOLIC CONGESTIVE HEART FAILURE (H): ICD-10-CM

## 2020-09-28 DIAGNOSIS — I70.0 ATHEROSCLEROSIS OF AORTA (H): ICD-10-CM

## 2020-09-28 PROCEDURE — 99309 SBSQ NF CARE MODERATE MDM 30: CPT | Performed by: NURSE PRACTITIONER

## 2020-09-28 NOTE — LETTER
9/28/2020        RE: Ermelinda Pfeiffer  [address]  [address]  [facility] [address]        [facility]  PRIMARY CARE PROVIDER AND CLINIC: AZAEL Martínez CNP, [address] [place] [address] / [place] [address]; Phone: [phone]; Fax: [phone]  Chief Complaint   Patient presents with     New Patient     Establish Care     [place] Medical Record Number: [id number]  Place of Service where encounter took place: [facility] () [[id number]]    Ermelinda Pfeiffer is a 89 year old (10/19/1930), admitted to the above facility from  [facility]. Hospital stay 07/27/20 through 08/01/20. Admitted to this facility for rehab, medical management and nursing care.    HPI:    HPI information obtained from: [facility] chart review.   Brief Summary of Hospital Course:   Ermelinda Pfeiffer  is a 89 year old  (10/19/1930) with a medical history of chronic diastolic heart failure, CAD with stent placement in 92' and 97', atrial fibrillation, pontine CVA in 2019, and osteoporosis. She  lived at [facility] in [place], [place] where she had progressive weakness and went to her PCP for evaluation. While there, she was less interactive and was sent to [place] for further evaluation.  CT negative, neg blood cultures, neg chest xray. Urine culture returned with  K of ecoli and she was started on mabrobid which was changed to cefdinir when her WBC increased to 20. She developed atrial fib with RVR and was started on metoprolol. Also restarted on PPI for a hx of esophagitis; had some black stool. She was admitted to the above facility from  [facility]. Hospital stay 7/27/2020 through 8/1/2020. Admitted to this facility for  rehab, medical management and nursing care.    Updates on Status Since Skilled nursing Admission:  Progressed with therapy but felt unsafe to return to [place] needing LTC placement. Ongoing intermittent issues  with insomnia but does not always take her prn melatonin. Also use of ambien prn (asking for it daily) aware of lingering effects, risk for falls but wants to keep on med list. Wants to remain Full Code-was discussed with last MD visit and again today. POLST updated to reflect Full Code. Wants prn nitroglycerin bedside worried staff won't get it in time and uses it about every 4 months. Was noticed with non-productive cough after dinner and hoarse voice which is new- no cough with today's exam.    CODE STATUS/ADVANCE DIRECTIVES DISCUSSION: CPR/Full code; Advance Directives: NO  Patient's living condition: lives in a skilled nursing facility [place]  ALLERGIES: Sulfamethoxazole-trimethoprim; Levofloxacin; Clarithromycin; Mirtazapine; and Penicillin g  PAST MEDICAL HISTORY:  has a past medical history of Acute sinus infection, Anemia, CAD (coronary artery disease), Cancer of the skin, basal cell, Cataract, Depressive disorder, Diplopia, Disturbance, sleep, GERD (gastroesophageal reflux disease), HTN (hypertension), Hyperlipidemia, Insomnia, Myocardial infarction (H), Osteoporosis, Skin cancer, Status post parathyroidectomy (05/23/2016), Stone, kidney, and Transient ischemic attack.  PAST SURGICAL HISTORY:   has a past surgical history that includes Parathyroidectomy (1969) and PERCUTANEOUS TRANSLUMINAL BALLOON ANGIOPLASTY WITH INSERTION OF STENT INTO CORONARY ARTERY N/A  (10/29/1997).  FAMILY HISTORY: family history includes Suicide in her maternal aunt and maternal uncle.  SOCIAL HISTORY:   reports that she has quit smoking. She has a 3.25 pack-year smoking history. She has never used smokeless tobacco. She reports that she does not drink alcohol or use drugs.    Post Discharge Medication Reconciliation Status: discharge medications reconciled, continue medications without change    Current Outpatient Medications   Medication Sig Dispense Refill     albuterol (PROAIR HFA/PROVENTIL HFA/VENTOLIN HFA) 108 (90 Base)  MCG/ACT inhaler Inhale 2 puffs into the lungs every 4 hours as needed for shortness of breath / dyspnea or wheezing       clopidogrel (PLAVIX) 75 MG tablet Take 75 mg by mouth daily       escitalopram (LEXAPRO) 5 MG tablet Take 5 mg by mouth daily       ferrous fumarate 65 mg, [place]. FE,-Vitamin C 125 mg (VITRON C)  MG TABS tablet Take 1 tablet by mouth daily       fluticasone-salmeterol (ADVAIR) 500-50 MCG/DOSE inhaler Inhale 1 puff into the lungs every 12 hours for Asthma Rinse and spit after each use.       hydrochlorothiazide (HYDRODIURIL) 25 MG tablet Take 25 mg by mouth daily every other day for blood pressure and fluid managment       isosorbide mononitrate (IMDUR) 30 MG 24 hr tablet Take 30 mg by mouth daily       lidocaine (LIDODERM) 5 % patch Place 1 patch onto the skin daily as needed for moderate pain To prevent lidocaine toxicity, patient should be patch free for 12 hrs daily.       losartan (COZAAR) 50 MG tablet Take 1.5 tablets (75 mg) by mouth daily       melatonin 3 MG tablet Take 6 mg by mouth nightly as needed        metoprolol tartrate (LOPRESSOR) 25 MG tablet Take 12.5 mg by mouth 2 times daily       nitroGLYcerin (NITROSTAT) 0.4 MG sublingual tablet Place 0.4 mg under the tongue every 5 minutes as needed for chest pain For chest pain place 1 tablet under the tongue every 5 minutes for 3 doses. If symptoms persist 5 minutes after 1st dose call 911.       pantoprazole (PROTONIX) 40 MG EC tablet Take 40 mg by mouth daily for Esophagitis Before breakfast       rosuvastatin (CRESTOR) 5 MG tablet Take 5 mg by mouth every other day At Bedtime       SENNA-docusate sodium (SENNA S) 8.6-50 MG tablet Take 1 tablet by mouth every 3 day(s) for constipation per patient request AND Give 1 tablet by mouth as needed for constipation BID       Vitamin D, Cholecalciferol, 25 MCG (1000 UT) TABS Take 1 tablet by mouth daily       zolpidem (AMBIEN) 5 MG tablet Take 2.5 mg by mouth nightly as needed for sleep    "    ROS:  4 point ROS including Respiratory, CV, GI and , other than that noted in the HPI,  is negative    Vitals:  BP (!) 147/64   Pulse 72   Temp 97.5  F (36.4  C)   Resp 18   Ht 1.549 m (5' 1\")   Wt 60.1 kg (132 lb 6.4 oz)   SpO2 97%   BMI 25.02 kg/m    Exam:  GENERAL APPEARANCE:  Alert, in no distress  ENT:  Mouth and posterior oropharynx normal, moist mucous membranes  EYES:  EOM, conjunctivae, right eye esotropia  RESP:  respiratory effort and palpation of chest normal, lungs clear to auscultation   CV:  Palpation and auscultation of heart done , regular rate and rhythm, no murmur, rub, or gallop  ABDOMEN:  no guarding or rebound  M/S:   kyphotic stance, some generalized weakness above baseline  SKIN:  intact to visualized areas  NEURO:   Cranial nerves 2-12 are normal tested and grossly at patient's baseline  PSYCH:  normal insight, judgement and memory    Lab/Diagnostic data:    Labs per TCU on 8/5/20: BMP within normal limits, WBC 7.7, Hgb 11.2, Platelets 363    ASSESSMENT/PLAN:  (G47.00) Insomnia, unspecified type  (primary encounter diagnosis)  Comment: ongoing. Mostly wants and takes ambien. Had daytime grogginess with mirtazapine and trazodone in the past   Plan:   --melatonin 6 mg at HS prn mostly does not use  --ambien 2.5 mg daily PRN but parameters not to administer after 2300    (I70.0) Atherosclerosis of aorta (H)  (I11.9) Hypertensive heart disease without heart failure  (I50.32) Chronic diastolic congestive heart failure (H)  (I48.20) Chronic atrial fibrillation (H)  (I25.10) Coronary artery disease involving native coronary artery without angina pectoris, unspecified whether native or transplanted heart  (E78.49) Other hyperlipidemia  (Z86.73) History of CVA (cerebrovascular accident)  Comment: Has vision changes on the right from the cva. Also has some intermittent drooling in which she worked with speech therapy for. Appears to chew and swallow food without difficulty. HR 70-80s. " Started on metoprolol while in the hospital. Hydrochlorothiazide increased to daily administration after chest xrays showed venous congestion in the hospital but she was discharged with every other day administration which was continued as blood pressures have been 130-140s.  Plan:   --continue with plavix 75 mg daily  --hydrochlorothiazide 25 mg every other daily  --losartan 75 mg daily  --isosorbide 30 mg daily  --metoprolol 12.5 mg po BID  --Continue to monitor blood pressure and adjust medications as needed.    (J45.40) Moderate persistent asthma without complication  Comment: stable  Plan:   --continue with advair 1 puff BID  --albuterol PRN     (K20.9) Esophagitis  Comment: stable   Plan:   --continue with iron and vitamin C supplement  --daily pantaprazole 40 mg daily    (R53.81) Physical deconditioning  Comment: continues to be falls risk with gait instability and visual deficits   Plan:   restorative walking program     (F32.9) Major depressive disorder, remission status unspecified, unspecified whether recurrent  Comment: stable   Plan:   -lexapro 5 mg po daily moving to  dosing   -ACP therapy           Electronically signed by:  AZAEL Martínez CNP         Sincerely,        AZAEL Martínez CNP

## 2020-09-28 NOTE — PROGRESS NOTES
[facility]  PRIMARY CARE PROVIDER AND CLINIC: Hao Araujo, AZAEL CNP, [address] [place] [address] / [place] [address]; Phone: [phone]; Fax: [phone]  Chief Complaint   Patient presents with     New Patient     Establish Care     [place] Medical Record Number: [id number]  Place of Service where encounter took place: [facility] ([facility]) [[id number]]    Ermelinda Pfeiffer is a 89 year old (10/19/1930), admitted to the above facility from  [facility]. Hospital stay 07/27/20 through 08/01/20. Admitted to this facility for rehab, medical management and nursing care.    HPI:    HPI information obtained from: [facility] [place] [facility] chart review.   Brief Summary of Hospital Course:   Ermelinda Pfeiffer  is a 89 year old  (10/19/1930) with a medical history of chronic diastolic heart failure, CAD with stent placement in 92' and 97', atrial fibrillation, pontine CVA in 2019, and osteoporosis. She  lived at [facility] in [place], [place] where she had progressive weakness and went to her PCP for evaluation. While there, she was less interactive and was sent to [place] for further evaluation.  CT negative, neg blood cultures, neg chest xray. Urine culture returned with  K of ecoli and she was started on mabrobid which was changed to cefdinir when her WBC increased to 20. She developed atrial fib with RVR and was started on metoprolol. Also restarted on PPI for a hx of esophagitis; had some black stool. She was admitted to the above facility from  [facility]. Hospital stay 7/27/2020 through 8/1/2020. Admitted to this facility for  rehab, medical management and nursing care.    Updates on Status Since Skilled nursing Admission:  Progressed with therapy but felt unsafe to return to [place] needing LTC placement. Ongoing intermittent issues with insomnia but does not always take her prn melatonin. Also use of ambien prn (asking for it daily)  aware of lingering effects, risk for falls but wants to keep on med list. Wants to remain Full Code-was discussed with last MD visit and again today. POLST updated to reflect Full Code. Wants prn nitroglycerin bedside worried staff won't get it in time and uses it about every 4 months. Was noticed with non-productive cough after dinner and hoarse voice which is new- no cough with today's exam.    CODE STATUS/ADVANCE DIRECTIVES DISCUSSION: CPR/Full code; Advance Directives: NO  Patient's living condition: lives in a skilled nursing facility [place]  ALLERGIES: Sulfamethoxazole-trimethoprim; Levofloxacin; Clarithromycin; Mirtazapine; and Penicillin g  PAST MEDICAL HISTORY:  has a past medical history of Acute sinus infection, Anemia, CAD (coronary artery disease), Cancer of the skin, basal cell, Cataract, Depressive disorder, Diplopia, Disturbance, sleep, GERD (gastroesophageal reflux disease), HTN (hypertension), Hyperlipidemia, Insomnia, Myocardial infarction (H), Osteoporosis, Skin cancer, Status post parathyroidectomy (05/23/2016), Stone, kidney, and Transient ischemic attack.  PAST SURGICAL HISTORY:   has a past surgical history that includes Parathyroidectomy (1969) and PERCUTANEOUS TRANSLUMINAL BALLOON ANGIOPLASTY WITH INSERTION OF STENT INTO CORONARY ARTERY N/A  (10/29/1997).  FAMILY HISTORY: family history includes Suicide in her maternal aunt and maternal uncle.  SOCIAL HISTORY:   reports that she has quit smoking. She has a 3.25 pack-year smoking history. She has never used smokeless tobacco. She reports that she does not drink alcohol or use drugs.    Post Discharge Medication Reconciliation Status: discharge medications reconciled, continue medications without change    Current Outpatient Medications   Medication Sig Dispense Refill     albuterol (PROAIR HFA/PROVENTIL HFA/VENTOLIN HFA) 108 (90 Base) MCG/ACT inhaler Inhale 2 puffs into the lungs every 4 hours as needed for shortness of breath / dyspnea  or wheezing       clopidogrel (PLAVIX) 75 MG tablet Take 75 mg by mouth daily       escitalopram (LEXAPRO) 5 MG tablet Take 5 mg by mouth daily       ferrous fumarate 65 mg, [place]. FE,-Vitamin C 125 mg (VITRON C)  MG TABS tablet Take 1 tablet by mouth daily       fluticasone-salmeterol (ADVAIR) 500-50 MCG/DOSE inhaler Inhale 1 puff into the lungs every 12 hours for Asthma Rinse and spit after each use.       hydrochlorothiazide (HYDRODIURIL) 25 MG tablet Take 25 mg by mouth daily every other day for blood pressure and fluid managment       isosorbide mononitrate (IMDUR) 30 MG 24 hr tablet Take 30 mg by mouth daily       lidocaine (LIDODERM) 5 % patch Place 1 patch onto the skin daily as needed for moderate pain To prevent lidocaine toxicity, patient should be patch free for 12 hrs daily.       losartan (COZAAR) 50 MG tablet Take 1.5 tablets (75 mg) by mouth daily       melatonin 3 MG tablet Take 6 mg by mouth nightly as needed        metoprolol tartrate (LOPRESSOR) 25 MG tablet Take 12.5 mg by mouth 2 times daily       nitroGLYcerin (NITROSTAT) 0.4 MG sublingual tablet Place 0.4 mg under the tongue every 5 minutes as needed for chest pain For chest pain place 1 tablet under the tongue every 5 minutes for 3 doses. If symptoms persist 5 minutes after 1st dose call 911.       pantoprazole (PROTONIX) 40 MG EC tablet Take 40 mg by mouth daily for Esophagitis Before breakfast       rosuvastatin (CRESTOR) 5 MG tablet Take 5 mg by mouth every other day At Bedtime       SENNA-docusate sodium (SENNA S) 8.6-50 MG tablet Take 1 tablet by mouth every 3 day(s) for constipation per patient request AND Give 1 tablet by mouth as needed for constipation BID       Vitamin D, Cholecalciferol, 25 MCG (1000 UT) TABS Take 1 tablet by mouth daily       zolpidem (AMBIEN) 5 MG tablet Take 2.5 mg by mouth nightly as needed for sleep       ROS:  4 point ROS including Respiratory, CV, GI and , other than that noted in the HPI,  is  "negative    Vitals:  BP (!) 147/64   Pulse 72   Temp 97.5  F (36.4  C)   Resp 18   Ht 1.549 m (5' 1\")   Wt 60.1 kg (132 lb 6.4 oz)   SpO2 97%   BMI 25.02 kg/m    Exam:  GENERAL APPEARANCE:  Alert, in no distress  ENT:  Mouth and posterior oropharynx normal, moist mucous membranes  EYES:  EOM, conjunctivae, right eye esotropia  RESP:  respiratory effort and palpation of chest normal, lungs clear to auscultation   CV:  Palpation and auscultation of heart done , regular rate and rhythm, no murmur, rub, or gallop  ABDOMEN:  no guarding or rebound  M/S:   kyphotic stance, some generalized weakness above baseline  SKIN:  intact to visualized areas  NEURO:   Cranial nerves 2-12 are normal tested and grossly at patient's baseline  PSYCH:  normal insight, judgement and memory    Lab/Diagnostic data:    Labs per TCU on 8/5/20: BMP within normal limits, WBC 7.7, Hgb 11.2, Platelets 363    ASSESSMENT/PLAN:  (G47.00) Insomnia, unspecified type  (primary encounter diagnosis)  Comment: ongoing. Mostly wants and takes ambien. Had daytime grogginess with mirtazapine and trazodone in the past   Plan:   --melatonin 6 mg at HS prn mostly does not use  --ambien 2.5 mg daily PRN but parameters not to administer after 2300    (I70.0) Atherosclerosis of aorta (H)  (I11.9) Hypertensive heart disease without heart failure  (I50.32) Chronic diastolic congestive heart failure (H)  (I48.20) Chronic atrial fibrillation (H)  (I25.10) Coronary artery disease involving native coronary artery without angina pectoris, unspecified whether native or transplanted heart  (E78.49) Other hyperlipidemia  (Z86.73) History of CVA (cerebrovascular accident)  Comment: Has vision changes on the right from the cva. Also has some intermittent drooling in which she worked with speech therapy for. Appears to chew and swallow food without difficulty. HR 70-80s. Started on metoprolol while in the hospital. Hydrochlorothiazide increased to daily administration " after chest xrays showed venous congestion in the hospital but she was discharged with every other day administration which was continued as blood pressures have been 130-140s.  Plan:   --continue with plavix 75 mg daily  --hydrochlorothiazide 25 mg every other daily  --losartan 75 mg daily  --isosorbide 30 mg daily  --metoprolol 12.5 mg po BID  --Continue to monitor blood pressure and adjust medications as needed.    (J45.40) Moderate persistent asthma without complication  Comment: stable  Plan:   --continue with advair 1 puff BID  --albuterol PRN     (K20.9) Esophagitis  Comment: stable   Plan:   --continue with iron and vitamin C supplement  --daily pantaprazole 40 mg daily    (R53.81) Physical deconditioning  Comment: continues to be falls risk with gait instability and visual deficits   Plan:   restorative walking program     (F32.9) Major depressive disorder, remission status unspecified, unspecified whether recurrent  Comment: stable   Plan:   -lexapro 5 mg po daily moving to  dosing   -ACP therapy           Electronically signed by:  AZAEL Martínez CNP

## 2020-09-30 ENCOUNTER — DOCUMENTATION ONLY (OUTPATIENT)
Dept: OTHER | Facility: CLINIC | Age: 85
End: 2020-09-30

## 2020-10-05 ENCOUNTER — NURSING HOME VISIT (OUTPATIENT)
Dept: GERIATRICS | Facility: CLINIC | Age: 85
End: 2020-10-05
Payer: MEDICARE

## 2020-10-05 DIAGNOSIS — I50.32 CHRONIC DIASTOLIC CONGESTIVE HEART FAILURE (H): Primary | ICD-10-CM

## 2020-10-05 DIAGNOSIS — I48.20 CHRONIC ATRIAL FIBRILLATION (H): ICD-10-CM

## 2020-10-05 DIAGNOSIS — Z86.73 HISTORY OF CVA (CEREBROVASCULAR ACCIDENT): ICD-10-CM

## 2020-10-05 DIAGNOSIS — J45.40 MODERATE PERSISTENT ASTHMA WITHOUT COMPLICATION: ICD-10-CM

## 2020-10-05 PROCEDURE — 99207 PR NO CHARGE LOS: CPT | Performed by: INTERNAL MEDICINE

## 2020-10-05 NOTE — PROGRESS NOTES
"[facility]  PRIMARY CARE PROVIDER AND CLINIC: Hao Araujo, AZAEL CNP, [address] [facility] [address] / [place] [address]; Phone: [phone]; Fax: [phone]      Patient was seen for initial long-term care visit by this provider at the [facility] on October 5, 2020.    The patient and/or legal representative have been notified of following:  \"This video visit will be conducted via a call between you and your provider. We have found that certain health care needs can be provided without the need for an in-person physical exam. This service lets us provide the care you need with a video conversation. If during the course of the call the provider feels a video visit is not appropriate, you will not be charged for this service.\"   The provider has received verbal consent for a Video Visit from the patient or first contact? Yes  Video Time: [id number]  Provider's Distant Location: [facility] Office.      Ermelinda Pfeiffer is a 89 year old (10/19/1930), admitted to the above facility from  [facility]. Hospital stay 07/27/20 through 08/01/20.     Admitted to this facility for rehab, medical management and nursing care.    Recent course reviewed:       Patient is a 89 year old  (10/19/1930) with a medical history of chronic diastolic heart failure, CAD with stent placement in 92' and 97', atrial fibrillation, pontine CVA in 2019, and osteoporosis. She  lived at [facility] in [place], [place] where she had progressive weakness and went to her PCP for evaluation. While there, she was less interactive and was sent to [facility] for further evaluation.  CT negative, neg blood cultures, neg chest xray. Urine culture returned with  K of ecoli and she was started on mabrobid which was changed to cefdinir when her WBC increased to 20. She developed atrial fib with RVR and was started on metoprolol. Also restarted on PPI for a hx of " "esophagitis; had some black stool. She was admitted to the [facility] from  [facility].     Pt made progress with therapy during the TCU stay but felt unsafe to return to [place] needing LTC placement.    Patient states she feels weak though \"a little stronger\".  She is frustrated that she cannot walk more with staff.  She is walking with a walker in her room and with staff for short distances, on a restorative program.   She denies cough, chest pain, shortness of breath, nausea, vomiting, abdominal pain.    She is concerned that her blood pressure has not been checked off and off.  It appears that it is being checked twice daily and readings recently have been within normal range.        CODE STATUS/ADVANCE DIRECTIVES DISCUSSION: CPR/Full code; Advance Directives: NO  Patient's living condition: lives in a skilled nursing facility was in [place]  ALLERGIES: Sulfamethoxazole-trimethoprim, Levofloxacin, Clarithromycin, Mirtazapine, and Penicillin g  PAST MEDICAL HISTORY:  has a past medical history of Acute sinus infection, Anemia, CAD (coronary artery disease), Cancer of the skin, basal cell, Cataract, Depressive disorder, Diplopia, Disturbance, sleep, GERD (gastroesophageal reflux disease), HTN (hypertension), Hyperlipidemia, Insomnia, Myocardial infarction (H), Osteoporosis, Skin cancer, Status post parathyroidectomy (05/23/2016), Stone, kidney, and Transient ischemic attack.  PAST SURGICAL HISTORY:   has a past surgical history that includes Parathyroidectomy (1969) and PERCUTANEOUS TRANSLUMINAL BALLOON ANGIOPLASTY WITH INSERTION OF STENT INTO CORONARY ARTERY N/A  (10/29/1997).  FAMILY HISTORY: family history includes Suicide in her maternal aunt and maternal uncle.  SOCIAL HISTORY:   reports that she has quit smoking. She has a 3.25 pack-year smoking history. She has never used smokeless tobacco. She reports that she does not drink alcohol or use drugs.        Current Outpatient " Medications   Medication Sig Dispense Refill     albuterol (PROAIR HFA/PROVENTIL HFA/VENTOLIN HFA) 108 (90 Base) MCG/ACT inhaler Inhale 2 puffs into the lungs every 4 hours as needed for shortness of breath / dyspnea or wheezing       clopidogrel (PLAVIX) 75 MG tablet Take 75 mg by mouth daily       escitalopram (LEXAPRO) 5 MG tablet Take 5 mg by mouth daily       ferrous fumarate 65 mg, [place]. FE,-Vitamin C 125 mg (VITRON C)  MG TABS tablet Take 1 tablet by mouth daily       fluticasone-salmeterol (ADVAIR) 500-50 MCG/DOSE inhaler Inhale 1 puff into the lungs every 12 hours for Asthma Rinse and spit after each use.       hydrochlorothiazide (HYDRODIURIL) 25 MG tablet Take 25 mg by mouth daily every other day for blood pressure and fluid managment       isosorbide mononitrate (IMDUR) 30 MG 24 hr tablet Take 30 mg by mouth daily       lidocaine (LIDODERM) 5 % patch Place 1 patch onto the skin daily as needed for moderate pain To prevent lidocaine toxicity, patient should be patch free for 12 hrs daily.       losartan (COZAAR) 50 MG tablet Take 1.5 tablets (75 mg) by mouth daily       melatonin 3 MG tablet Take 6 mg by mouth nightly as needed        metoprolol tartrate (LOPRESSOR) 25 MG tablet Take 12.5 mg by mouth 2 times daily       nitroGLYcerin (NITROSTAT) 0.4 MG sublingual tablet Place 0.4 mg under the tongue every 5 minutes as needed for chest pain For chest pain place 1 tablet under the tongue every 5 minutes for 3 doses. If symptoms persist 5 minutes after 1st dose call 911.       pantoprazole (PROTONIX) 40 MG EC tablet Take 40 mg by mouth daily for Esophagitis Before breakfast       rosuvastatin (CRESTOR) 5 MG tablet Take 5 mg by mouth every other day At Bedtime       SENNA-docusate sodium (SENNA S) 8.6-50 MG tablet Take 1 tablet by mouth 2 times daily as needed       Vitamin D, Cholecalciferol, 25 MCG (1000 UT) TABS Take 1 tablet by mouth daily       zolpidem (AMBIEN) 5 MG tablet Take 2.5 mg by mouth  nightly as needed for sleep             Exam:  BP has been controlled over the last several days on current medication regimen    GENERAL APPEARANCE:    Lying in bed, alert, appears comfortable  Pt is fully oriented  Respirations unlabored  Face symmetric  Speech sl dysarthric, slow  Dysconjugate gaze  No gross focal weakness        ASSESSMENT/PLAN:    Hx CVA  Mild speech dysfunction  Mild vision changes  History of gait instability with falls prior to recent hospital admission.  Plan continue Plavix, statin. Ambulate as possible      (I11.9) Hypertensive heart disease without heart failure  (I50.32) Chronic diastolic congestive heart failure (H)  (I48.20) Chronic atrial fibrillation (H)  (I25.10) Coronary artery disease involving native coronary artery without angina pectoris, unspecified whether native or transplanted heart  CV status stable, no cardiac symptoms. Vitals have been stable  Plan continue current tx, monitor BMP, vitals    (J45.40) Moderate persistent asthma without complication  Comment: stable  Plan: continue  advair 1 puff BID     (K20.9) Esophagitis  Comment: stable   Plan: continue PPI    (F32.9) Major depressive disorder, remission status unspecified, unspecified whether recurrent  Comment: stable   Plan: continue Lexapro      Mauricio Ortega MD

## 2020-11-01 ASSESSMENT — MIFFLIN-ST. JEOR: SCORE: 962.93

## 2020-11-02 ENCOUNTER — NURSING HOME VISIT (OUTPATIENT)
Dept: GERIATRICS | Facility: CLINIC | Age: 85
End: 2020-11-02
Payer: MEDICARE

## 2020-11-02 VITALS
SYSTOLIC BLOOD PRESSURE: 132 MMHG | RESPIRATION RATE: 18 BRPM | TEMPERATURE: 98.6 F | WEIGHT: 133.5 LBS | OXYGEN SATURATION: 97 % | DIASTOLIC BLOOD PRESSURE: 64 MMHG | BODY MASS INDEX: 25.2 KG/M2 | HEIGHT: 61 IN | HEART RATE: 79 BPM

## 2020-11-02 DIAGNOSIS — F32.9 MAJOR DEPRESSIVE DISORDER, REMISSION STATUS UNSPECIFIED, UNSPECIFIED WHETHER RECURRENT: ICD-10-CM

## 2020-11-02 DIAGNOSIS — I48.20 CHRONIC ATRIAL FIBRILLATION (H): ICD-10-CM

## 2020-11-02 DIAGNOSIS — Z86.73 HISTORY OF CVA (CEREBROVASCULAR ACCIDENT): ICD-10-CM

## 2020-11-02 DIAGNOSIS — G47.00 INSOMNIA, UNSPECIFIED TYPE: Primary | ICD-10-CM

## 2020-11-02 DIAGNOSIS — I50.32 CHRONIC DIASTOLIC CONGESTIVE HEART FAILURE (H): ICD-10-CM

## 2020-11-02 DIAGNOSIS — E78.49 OTHER HYPERLIPIDEMIA: ICD-10-CM

## 2020-11-02 DIAGNOSIS — I11.9 HYPERTENSIVE HEART DISEASE WITHOUT HEART FAILURE: ICD-10-CM

## 2020-11-02 PROCEDURE — 99309 SBSQ NF CARE MODERATE MDM 30: CPT | Performed by: NURSE PRACTITIONER

## 2020-11-02 NOTE — PROGRESS NOTES
[place] [facility]  Chief Complaint   Patient presents with     [facility]     [facility] Medical Record Number: [id number]  Place of Service where encounter took place: [facility] ([facility]) [[id number]]    HPI:    Ermelinda Pfeiffer is 89 year old (10/19/1930), who is being seen today for a federally mandated E/M visit. HPI information obtained from: facility chart records, patient report and [facility] Epic chart review. Today's concerns are:    Seen today for follow up HTN, deconditioning, insomnia and other chronic conditions. Enjoyed her JOYCELYN with family and was happy to see her son from [place]. No acute concerns. Resting in bed and had been up in chair for a while.    By history has chronic diastolic heart failure, CAD with stent placement in 92' and 97', atrial fibrillation, pontine CVA in 2019, and osteoporosis. She  lived at [facility] in [place], [place] was hospitalized for UTI, developed a-fib with RVR so started on metoprolol.  Also restarted on PPI for a hx of esophagitis; had some black stool. Was felt unsafe to return to [place].    ALLERGIES:Sulfamethoxazole-trimethoprim, Levofloxacin, Clarithromycin, Mirtazapine, and Penicillin g  PAST MEDICAL HISTORY:   has a past medical history of Acute sinus infection, Anemia, CAD (coronary artery disease), Cancer of the skin, basal cell, Cataract, Depressive disorder, Diplopia, Disturbance, sleep, GERD (gastroesophageal reflux disease), HTN (hypertension), Hyperlipidemia, Insomnia, Myocardial infarction (H), Osteoporosis, Skin cancer, Status post parathyroidectomy (05/23/2016), Stone, kidney, and Transient ischemic attack.  PAST SURGICAL HISTORY:   has a past surgical history that includes Parathyroidectomy (1969) and PERCUTANEOUS TRANSLUMINAL BALLOON ANGIOPLASTY WITH INSERTION OF STENT INTO CORONARY ARTERY N/A  (10/29/1997).  FAMILY HISTORY: family history includes Suicide in her maternal aunt and maternal uncle.  SOCIAL  HISTORY:  reports that she has quit smoking. She has a 3.25 pack-year smoking history. She has never used smokeless tobacco. She reports that she does not drink alcohol or use drugs.    MEDICATIONS:  Current Outpatient Medications   Medication Sig Dispense Refill     albuterol (PROAIR HFA/PROVENTIL HFA/VENTOLIN HFA) 108 (90 Base) MCG/ACT inhaler Inhale 2 puffs into the lungs every 4 hours as needed for shortness of breath / dyspnea or wheezing       clopidogrel (PLAVIX) 75 MG tablet Take 75 mg by mouth daily       escitalopram (LEXAPRO) 5 MG tablet Take 5 mg by mouth daily       ferrous fumarate 65 mg, [place]. FE,-Vitamin C 125 mg (VITRON C)  MG TABS tablet Take 1 tablet by mouth daily       fluticasone-salmeterol (ADVAIR) 500-50 MCG/DOSE inhaler Inhale 1 puff into the lungs every 12 hours for Asthma Rinse and spit after each use.       hydrochlorothiazide (HYDRODIURIL) 25 MG tablet Take 25 mg by mouth daily every other day for blood pressure and fluid managment       isosorbide mononitrate (IMDUR) 30 MG 24 hr tablet Take 30 mg by mouth daily       lidocaine (LIDODERM) 5 % patch Place 1 patch onto the skin daily as needed for moderate pain To prevent lidocaine toxicity, patient should be patch free for 12 hrs daily.       losartan (COZAAR) 50 MG tablet Take 1.5 tablets (75 mg) by mouth daily       melatonin 3 MG tablet Take 6 mg by mouth nightly as needed        metoprolol tartrate (LOPRESSOR) 25 MG tablet Take 12.5 mg by mouth 2 times daily       nitroGLYcerin (NITROSTAT) 0.4 MG sublingual tablet Place 0.4 mg under the tongue every 5 minutes as needed for chest pain For chest pain place 1 tablet under the tongue every 5 minutes for 3 doses. If symptoms persist 5 minutes after 1st dose call 911.       pantoprazole (PROTONIX) 40 MG EC tablet Take 40 mg by mouth daily for Esophagitis Before breakfast       rosuvastatin (CRESTOR) 5 MG tablet Take 5 mg by mouth every other day At Bedtime       SENNA-docusate sodium  "(SENNA S) 8.6-50 MG tablet Take 1 tablet by mouth 2 times daily as needed       Vitamin D, Cholecalciferol, 25 MCG (1000 UT) TABS Take 1 tablet by mouth daily       zolpidem (AMBIEN) 5 MG tablet Take 2.5 mg by mouth nightly as needed for sleep         Case Management:  I have reviewed the care plan and MDS and do agree with the plan. Patient's desire to return to the community is present, but is not able due to care needs . Information reviewed:  Medications, vital signs, orders, and nursing notes.    ROS:  4 point ROS including Respiratory, CV, GI and , other than that noted in the HPI,  is negative    Vitals:  /64   Pulse 79   Temp 98.6  F (37  C)   Resp 18   Ht 1.549 m (5' 1\")   Wt 60.6 kg (133 lb 8 oz)   SpO2 97%   BMI 25.22 kg/m    Body mass index is 25.22 kg/m .  Exam:  GENERAL APPEARANCE:  Alert, in no distress  ENT:  Mouth and posterior oropharynx normal, dry mucous membranes  EYES:  EOM, conjunctivae, right eye esotropia  RESP:  respiratory effort and palpation of chest normal, lungs clear to auscultation   CV:  Palpation and auscultation of heart done , regular rate and rhythm, no murmur, rub, or gallop  ABDOMEN:  no guarding or rebound  M/S:   kyphotic stance, some generalized weakness above baseline  SKIN:  intact to visualized areas  NEURO:   Cranial nerves 2-12 are normal tested and grossly at patient's baseline  PSYCH:  normal insight, judgement and memory    Lab/Diagnostic data:   CBC RESULTS:   Recent Labs   Lab Test 08/05/20   WBC 7.7   RBC 3.87   HGB 11.2*   HCT 37.6   MCV 97   MCH 28.9   MCHC 29.8*   RDW 13.4          Last Basic Metabolic Panel:  Recent Labs   Lab Test 08/05/20      POTASSIUM 3.8   CHLORIDE 104   NELSON 8.7   CO2 28   BUN 18   CR 0.78   GLC 89       Liver Function Studies - No results for input(s): PROTTOTAL, ALBUMIN, BILITOTAL, ALKPHOS, AST, ALT, BILIDIRECT in the last 61503 hours.    No results found for: TSH]    No results found for: " A1C    ASSESSMENT/PLAN  (G47.00) Insomnia, unspecified type  (primary encounter diagnosis)  Comment: Mostly wants and takes ambien. Had daytime grogginess with mirtazapine and trazodone in the past   Plan:   --melatonin 6 mg at HS prn mostly does not use  --ambien 2.5 mg daily PRN but parameters not to administer after 2300     (I50.32) Chronic diastolic congestive heart failure (H)  (I48.20) Chronic atrial fibrillation (H)  (I11.9) Hypertensive heart disease without heart failure  (Z86.73) History of CVA (cerebrovascular accident)  (E78.49) Other hyperlipidemia  Comment: Vision changes from CVA. BP average 149/69 HR 69-74. Weight 132-135 lbs.  Plan:   --continue with plavix 75 mg daily  --hydrochlorothiazide 25 mg every other daily  --losartan 75 mg daily  --isosorbide 30 mg daily  --metoprolol 12.5 mg po BID  --Continue to monitor blood pressure and adjust medications as needed.  --BMP periodically     (F32.9) Major depressive disorder, remission status unspecified, unspecified whether recurrent  Comment: does have some frustrations with caregivers at LT that can exacerbate depressed mood  Plan:   -lexapro 5 mg po daily  -ACP therapy        -consent today for annual influenza vaccine so was ordered        Electronically signed by:  AZAEL Martínez CNP

## 2020-12-10 ENCOUNTER — NURSING HOME VISIT (OUTPATIENT)
Dept: GERIATRICS | Facility: CLINIC | Age: 85
End: 2020-12-10
Payer: MEDICARE

## 2020-12-10 ENCOUNTER — RECORDS - HEALTHEAST (OUTPATIENT)
Dept: LAB | Facility: CLINIC | Age: 85
End: 2020-12-10

## 2020-12-10 DIAGNOSIS — I11.9 HYPERTENSIVE HEART DISEASE WITHOUT HEART FAILURE: Primary | ICD-10-CM

## 2020-12-10 DIAGNOSIS — I50.32 CHRONIC DIASTOLIC CONGESTIVE HEART FAILURE (H): ICD-10-CM

## 2020-12-10 DIAGNOSIS — Z86.73 HISTORY OF CVA (CEREBROVASCULAR ACCIDENT): ICD-10-CM

## 2020-12-10 DIAGNOSIS — R39.15 URINARY URGENCY: ICD-10-CM

## 2020-12-10 LAB
ALBUMIN UR-MCNC: ABNORMAL MG/DL
APPEARANCE UR: ABNORMAL
BACTERIA #/AREA URNS HPF: ABNORMAL HPF
BILIRUB UR QL STRIP: NEGATIVE
COLOR UR AUTO: YELLOW
GLUCOSE UR STRIP-MCNC: NEGATIVE MG/DL
HGB UR QL STRIP: ABNORMAL
KETONES UR STRIP-MCNC: NEGATIVE MG/DL
LEUKOCYTE ESTERASE UR QL STRIP: ABNORMAL
NITRATE UR QL: NEGATIVE
PH UR STRIP: 5.5 [PH] (ref 4.5–8)
RBC #/AREA URNS AUTO: ABNORMAL HPF
SP GR UR STRIP: 1.03 (ref 1–1.03)
SQUAMOUS #/AREA URNS AUTO: ABNORMAL LPF
UROBILINOGEN UR STRIP-ACNC: ABNORMAL
WBC #/AREA URNS AUTO: >100 HPF

## 2020-12-10 NOTE — PROGRESS NOTES
"Pt was seen for a regulatory LTC visit    The patient and/or legal representative have been notified of following:  \"This video visit will be conducted via a call between you and your provider. We have found that certain health care needs can be provided without the need for an in-person physical exam. This service lets us provide the care you need with a video conversation. If during the course of the call the provider feels a video visit is not appropriate, you will not be charged for this service.\"   The provider has received verbal consent for a Video Visit from the patient or first contact? Yes  Video Time: [id number]  Provider's Distant Location: [facility].    Pt reports feeling fairly well  She notes increased urinary frequency without dysuria, is concerned she might have a UTI    She states she is walking short distances, notes mild JUNG  She has an occ non-productive cough, denies difficulty swallowing    VSS  Alert, pleasant, thin appearing  Sitting in chair in room  Fully oriented  No coughing noted during exam  No LE edema      Assessment    Urinary frequency, r/out UTI    CAD, history of chronic diastolic CHF, appears compensated, on metprolol, losartan, hydrochlorothiazide, plavix, isosorbide    History of Afib, no clinical recurrence    GERD, stable on PPI    Plan  UA/UC  Continue current medications  Monitor exam, vitals, BMP    "

## 2020-12-11 LAB — BACTERIA SPEC CULT: NO GROWTH

## 2020-12-11 NOTE — PROGRESS NOTES
UA/UC yesterday and UA looks positive. Orders for 1 g Rocephin IM today. Cx pending and she will be leaving facility with family this evening for extended JOYCELYN with raising number of COVID-19 in the building. Family aware and will also follow up with [facility] on cx results. They are requesting prescription for any oral antibiotics if needed to be sent to [facility] in [place] [phone].

## 2020-12-17 ENCOUNTER — TELEPHONE (OUTPATIENT)
Dept: GERIATRICS | Facility: CLINIC | Age: 85
End: 2020-12-17

## 2020-12-17 NOTE — TELEPHONE ENCOUNTER
Prior Authorization Retail Medication Request    Medication/Dose: Lidocaine (lidoderm) 5% patch  ICD code (if different than what is on RX): R52  Rationale: place 1 patch onto skin daily PRN for moderate pain    Insurance Name: Medicare  Insurance ID: [id number]    Pharmacy Information (if different than what is on RX)  Name: [facility] - [place], [place]  Phone: [phone]  Fax: [phone]

## 2020-12-18 NOTE — TELEPHONE ENCOUNTER
PRIOR AUTHORIZATION DENIED    Medication: LIDOCAINE-DENIED    Denial Date: 12/18/2020    Denial Rational: PATIENT DID NOT MEET CRITERIA - R52 IS NOT AN FDA APPROVED INDICATION FOR COVERAGE.        Appeal Information:  IF YOU WOULD LIKE TO APPEAL PLEASE SUPPLY PA TEAM WITH A LETTER OF MEDICAL NECESSITY WITH CLINICAL REASON.

## 2020-12-18 NOTE — TELEPHONE ENCOUNTER
Central Prior Authorization Team   Phone: [phone]    PA Initiation    Medication:   Insurance Company: [employer] - Phone [phone] Fax [phone]  Pharmacy Filling the Rx: [facility] - [place] - [address]  Filling Pharmacy Phone: [phone]  Filling Pharmacy Fax: [phone]  Start Date: 12/18/2020

## 2021-01-01 ENCOUNTER — LAB REQUISITION (OUTPATIENT)
Dept: LAB | Facility: CLINIC | Age: 86
End: 2021-01-01
Payer: MEDICARE

## 2021-01-01 ENCOUNTER — RECORDS - HEALTHEAST (OUTPATIENT)
Dept: LAB | Facility: CLINIC | Age: 86
End: 2021-01-01

## 2021-01-01 ENCOUNTER — NURSING HOME VISIT (OUTPATIENT)
Dept: GERIATRICS | Facility: CLINIC | Age: 86
End: 2021-01-01
Payer: MEDICARE

## 2021-01-01 ENCOUNTER — DOCUMENTATION ONLY (OUTPATIENT)
Dept: OTHER | Facility: CLINIC | Age: 86
End: 2021-01-01

## 2021-01-01 ENCOUNTER — TELEPHONE (OUTPATIENT)
Dept: GERIATRICS | Facility: CLINIC | Age: 86
End: 2021-01-01

## 2021-01-01 ENCOUNTER — APPOINTMENT (OUTPATIENT)
Dept: PHYSICAL THERAPY | Facility: CLINIC | Age: 86
DRG: 291 | End: 2021-01-01
Attending: INTERNAL MEDICINE
Payer: MEDICARE

## 2021-01-01 ENCOUNTER — TRANSFERRED RECORDS (OUTPATIENT)
Dept: HEALTH INFORMATION MANAGEMENT | Facility: CLINIC | Age: 86
End: 2021-01-01

## 2021-01-01 ENCOUNTER — APPOINTMENT (OUTPATIENT)
Dept: CARDIOLOGY | Facility: CLINIC | Age: 86
DRG: 291 | End: 2021-01-01
Attending: INTERNAL MEDICINE
Payer: MEDICARE

## 2021-01-01 ENCOUNTER — HOSPITAL ENCOUNTER (INPATIENT)
Facility: CLINIC | Age: 86
LOS: 3 days | Discharge: NURSING, REHAB OR LONG-TERM CARE | DRG: 291 | End: 2021-11-02
Attending: EMERGENCY MEDICINE | Admitting: INTERNAL MEDICINE
Payer: MEDICARE

## 2021-01-01 ENCOUNTER — PATIENT OUTREACH (OUTPATIENT)
Dept: CARE COORDINATION | Facility: CLINIC | Age: 86
End: 2021-01-01

## 2021-01-01 ENCOUNTER — APPOINTMENT (OUTPATIENT)
Dept: CT IMAGING | Facility: CLINIC | Age: 86
DRG: 291 | End: 2021-01-01
Attending: INTERNAL MEDICINE
Payer: MEDICARE

## 2021-01-01 ENCOUNTER — APPOINTMENT (OUTPATIENT)
Dept: GENERAL RADIOLOGY | Facility: CLINIC | Age: 86
DRG: 291 | End: 2021-01-01
Attending: EMERGENCY MEDICINE
Payer: MEDICARE

## 2021-01-01 VITALS
SYSTOLIC BLOOD PRESSURE: 137 MMHG | WEIGHT: 144.6 LBS | HEIGHT: 61 IN | HEART RATE: 74 BPM | DIASTOLIC BLOOD PRESSURE: 73 MMHG | RESPIRATION RATE: 18 BRPM | TEMPERATURE: 98.1 F | BODY MASS INDEX: 27.3 KG/M2 | OXYGEN SATURATION: 96 %

## 2021-01-01 VITALS
DIASTOLIC BLOOD PRESSURE: 67 MMHG | HEIGHT: 61 IN | OXYGEN SATURATION: 96 % | WEIGHT: 146 LBS | HEART RATE: 74 BPM | RESPIRATION RATE: 18 BRPM | SYSTOLIC BLOOD PRESSURE: 141 MMHG | TEMPERATURE: 97.9 F | BODY MASS INDEX: 27.56 KG/M2

## 2021-01-01 VITALS
RESPIRATION RATE: 18 BRPM | HEIGHT: 61 IN | HEART RATE: 68 BPM | BODY MASS INDEX: 28.32 KG/M2 | WEIGHT: 150 LBS | DIASTOLIC BLOOD PRESSURE: 65 MMHG | TEMPERATURE: 97.5 F | SYSTOLIC BLOOD PRESSURE: 137 MMHG | OXYGEN SATURATION: 95 %

## 2021-01-01 VITALS
BODY MASS INDEX: 28.94 KG/M2 | RESPIRATION RATE: 18 BRPM | SYSTOLIC BLOOD PRESSURE: 151 MMHG | HEIGHT: 61 IN | HEART RATE: 76 BPM | OXYGEN SATURATION: 96 % | WEIGHT: 153.3 LBS | DIASTOLIC BLOOD PRESSURE: 68 MMHG | TEMPERATURE: 97.7 F

## 2021-01-01 VITALS
WEIGHT: 131.8 LBS | BODY MASS INDEX: 26.92 KG/M2 | DIASTOLIC BLOOD PRESSURE: 67 MMHG | TEMPERATURE: 97.5 F | OXYGEN SATURATION: 95 % | HEIGHT: 61 IN | RESPIRATION RATE: 18 BRPM | HEART RATE: 63 BPM | OXYGEN SATURATION: 95 % | HEART RATE: 71 BPM | BODY MASS INDEX: 24.88 KG/M2 | WEIGHT: 142.6 LBS | SYSTOLIC BLOOD PRESSURE: 134 MMHG | SYSTOLIC BLOOD PRESSURE: 138 MMHG | RESPIRATION RATE: 18 BRPM | DIASTOLIC BLOOD PRESSURE: 65 MMHG | HEIGHT: 61 IN | TEMPERATURE: 97.3 F

## 2021-01-01 VITALS
OXYGEN SATURATION: 97 % | WEIGHT: 141.6 LBS | HEIGHT: 61 IN | DIASTOLIC BLOOD PRESSURE: 87 MMHG | TEMPERATURE: 97.7 F | BODY MASS INDEX: 26.73 KG/M2 | SYSTOLIC BLOOD PRESSURE: 173 MMHG | RESPIRATION RATE: 18 BRPM | HEART RATE: 75 BPM

## 2021-01-01 VITALS
OXYGEN SATURATION: 95 % | HEIGHT: 61 IN | SYSTOLIC BLOOD PRESSURE: 132 MMHG | BODY MASS INDEX: 26.81 KG/M2 | HEART RATE: 78 BPM | RESPIRATION RATE: 18 BRPM | DIASTOLIC BLOOD PRESSURE: 74 MMHG | WEIGHT: 142 LBS | TEMPERATURE: 97.3 F

## 2021-01-01 VITALS
DIASTOLIC BLOOD PRESSURE: 79 MMHG | HEIGHT: 61 IN | SYSTOLIC BLOOD PRESSURE: 167 MMHG | BODY MASS INDEX: 27.28 KG/M2 | HEART RATE: 76 BPM | TEMPERATURE: 97.7 F | WEIGHT: 144.5 LBS | OXYGEN SATURATION: 97 % | RESPIRATION RATE: 18 BRPM

## 2021-01-01 VITALS
WEIGHT: 137.5 LBS | HEIGHT: 61 IN | OXYGEN SATURATION: 97 % | DIASTOLIC BLOOD PRESSURE: 72 MMHG | HEART RATE: 68 BPM | BODY MASS INDEX: 25.96 KG/M2 | SYSTOLIC BLOOD PRESSURE: 144 MMHG | RESPIRATION RATE: 18 BRPM | TEMPERATURE: 97.7 F

## 2021-01-01 VITALS
WEIGHT: 135.6 LBS | OXYGEN SATURATION: 97 % | DIASTOLIC BLOOD PRESSURE: 71 MMHG | RESPIRATION RATE: 18 BRPM | TEMPERATURE: 97.7 F | HEIGHT: 61 IN | HEART RATE: 73 BPM | SYSTOLIC BLOOD PRESSURE: 137 MMHG | BODY MASS INDEX: 25.6 KG/M2

## 2021-01-01 VITALS
BODY MASS INDEX: 28.02 KG/M2 | OXYGEN SATURATION: 95 % | RESPIRATION RATE: 18 BRPM | WEIGHT: 148.4 LBS | DIASTOLIC BLOOD PRESSURE: 86 MMHG | TEMPERATURE: 97.1 F | SYSTOLIC BLOOD PRESSURE: 185 MMHG | HEIGHT: 61 IN | HEART RATE: 69 BPM

## 2021-01-01 DIAGNOSIS — I10 ESSENTIAL HYPERTENSION: ICD-10-CM

## 2021-01-01 DIAGNOSIS — R06.2 WHEEZE: ICD-10-CM

## 2021-01-01 DIAGNOSIS — J45.40 MODERATE PERSISTENT ASTHMA WITHOUT COMPLICATION: Primary | ICD-10-CM

## 2021-01-01 DIAGNOSIS — Z71.89 OTHER SPECIFIED COUNSELING: ICD-10-CM

## 2021-01-01 DIAGNOSIS — F51.01 PRIMARY INSOMNIA: ICD-10-CM

## 2021-01-01 DIAGNOSIS — I50.32 CHRONIC DIASTOLIC CONGESTIVE HEART FAILURE (H): ICD-10-CM

## 2021-01-01 DIAGNOSIS — N30.00 ACUTE CYSTITIS WITHOUT HEMATURIA: Primary | ICD-10-CM

## 2021-01-01 DIAGNOSIS — I50.32 CHRONIC DIASTOLIC CONGESTIVE HEART FAILURE (H): Primary | ICD-10-CM

## 2021-01-01 DIAGNOSIS — F51.01 PRIMARY INSOMNIA: Primary | ICD-10-CM

## 2021-01-01 DIAGNOSIS — I11.9 HYPERTENSIVE HEART DISEASE WITHOUT HEART FAILURE: ICD-10-CM

## 2021-01-01 DIAGNOSIS — I11.0 HYPERTENSIVE HEART DISEASE WITH HEART FAILURE (H): Primary | ICD-10-CM

## 2021-01-01 DIAGNOSIS — M79.672 HEEL PAIN, BILATERAL: Primary | ICD-10-CM

## 2021-01-01 DIAGNOSIS — I11.0 HYPERTENSIVE HEART DISEASE WITH HEART FAILURE (H): ICD-10-CM

## 2021-01-01 DIAGNOSIS — Z86.73 HISTORY OF CVA (CEREBROVASCULAR ACCIDENT): ICD-10-CM

## 2021-01-01 DIAGNOSIS — M79.671 HEEL PAIN, BILATERAL: Primary | ICD-10-CM

## 2021-01-01 DIAGNOSIS — Z87.440 PERSONAL HISTORY OF URINARY TRACT INFECTION: ICD-10-CM

## 2021-01-01 DIAGNOSIS — M79.671 HEEL PAIN, BILATERAL: ICD-10-CM

## 2021-01-01 DIAGNOSIS — J44.9 CHRONIC OBSTRUCTIVE PULMONARY DISEASE, UNSPECIFIED COPD TYPE (H): Primary | ICD-10-CM

## 2021-01-01 DIAGNOSIS — L98.9 SKIN LESION: ICD-10-CM

## 2021-01-01 DIAGNOSIS — G47.00 INSOMNIA, UNSPECIFIED TYPE: ICD-10-CM

## 2021-01-01 DIAGNOSIS — I25.10 CORONARY ARTERY DISEASE INVOLVING NATIVE CORONARY ARTERY WITHOUT ANGINA PECTORIS, UNSPECIFIED WHETHER NATIVE OR TRANSPLANTED HEART: ICD-10-CM

## 2021-01-01 DIAGNOSIS — Z71.89 ACP (ADVANCE CARE PLANNING): ICD-10-CM

## 2021-01-01 DIAGNOSIS — I50.9 ACUTE CONGESTIVE HEART FAILURE, UNSPECIFIED HEART FAILURE TYPE (H): ICD-10-CM

## 2021-01-01 DIAGNOSIS — J45.40 MODERATE PERSISTENT ASTHMA WITHOUT COMPLICATION: ICD-10-CM

## 2021-01-01 DIAGNOSIS — N39.0 RECURRENT URINARY TRACT INFECTION: ICD-10-CM

## 2021-01-01 DIAGNOSIS — I50.9 HEART FAILURE, UNSPECIFIED (H): ICD-10-CM

## 2021-01-01 DIAGNOSIS — R30.0 DYSURIA: ICD-10-CM

## 2021-01-01 DIAGNOSIS — J44.9 CHRONIC OBSTRUCTIVE PULMONARY DISEASE, UNSPECIFIED COPD TYPE (H): ICD-10-CM

## 2021-01-01 DIAGNOSIS — R09.02 HYPOXIA: ICD-10-CM

## 2021-01-01 DIAGNOSIS — N39.0 URINARY TRACT INFECTION, SITE NOT SPECIFIED: ICD-10-CM

## 2021-01-01 DIAGNOSIS — N39.0 URINARY TRACT INFECTION WITHOUT HEMATURIA, SITE UNSPECIFIED: ICD-10-CM

## 2021-01-01 DIAGNOSIS — M79.672 HEEL PAIN, BILATERAL: ICD-10-CM

## 2021-01-01 DIAGNOSIS — I10 ESSENTIAL HYPERTENSION: Primary | ICD-10-CM

## 2021-01-01 LAB
ALBUMIN SERPL-MCNC: 3.5 G/DL (ref 3.4–5)
ALBUMIN UR-MCNC: 30 MG/DL
ALBUMIN UR-MCNC: NEGATIVE MG/DL
ALBUMIN UR-MCNC: NEGATIVE MG/DL
ALP SERPL-CCNC: 95 U/L (ref 40–150)
ALT SERPL W P-5'-P-CCNC: 22 U/L (ref 0–50)
AMORPH CRY #/AREA URNS HPF: ABNORMAL /HPF
ANION GAP SERPL CALCULATED.3IONS-SCNC: 10 MMOL/L (ref 5–18)
ANION GAP SERPL CALCULATED.3IONS-SCNC: 10 MMOL/L (ref 5–18)
ANION GAP SERPL CALCULATED.3IONS-SCNC: 11 MMOL/L (ref 5–18)
ANION GAP SERPL CALCULATED.3IONS-SCNC: 12 MMOL/L (ref 5–18)
ANION GAP SERPL CALCULATED.3IONS-SCNC: 5 MMOL/L (ref 3–14)
ANION GAP SERPL CALCULATED.3IONS-SCNC: 6 MMOL/L (ref 5–18)
ANION GAP SERPL CALCULATED.3IONS-SCNC: 6 MMOL/L (ref 5–18)
ANION GAP SERPL CALCULATED.3IONS-SCNC: 7 MMOL/L (ref 3–14)
ANION GAP SERPL CALCULATED.3IONS-SCNC: 8 MMOL/L (ref 5–18)
APPEARANCE UR: ABNORMAL
APPEARANCE UR: CLEAR
APPEARANCE UR: CLEAR
AST SERPL W P-5'-P-CCNC: 21 U/L (ref 0–45)
ATRIAL RATE - MUSE: 101 BPM
BACTERIA #/AREA URNS HPF: ABNORMAL /HPF
BACTERIA BLD CULT: NO GROWTH
BACTERIA SPEC CULT: ABNORMAL
BACTERIA UR CULT: ABNORMAL
BACTERIA UR CULT: NO GROWTH
BACTERIA UR CULT: NO GROWTH
BASE EXCESS BLDV CALC-SCNC: 0.6 MMOL/L (ref -7.7–1.9)
BASOPHILS # BLD AUTO: 0 10E3/UL (ref 0–0.2)
BASOPHILS # BLD AUTO: 0 THOU/UL (ref 0–0.2)
BASOPHILS # BLD AUTO: 0 THOU/UL (ref 0–0.2)
BASOPHILS NFR BLD AUTO: 0 %
BASOPHILS NFR BLD AUTO: 0 % (ref 0–2)
BASOPHILS NFR BLD AUTO: 0 % (ref 0–2)
BILIRUB SERPL-MCNC: 0.4 MG/DL (ref 0.2–1.3)
BILIRUB UR QL STRIP: NEGATIVE
BUN SERPL-MCNC: 20 MG/DL (ref 8–28)
BUN SERPL-MCNC: 21 MG/DL (ref 8–28)
BUN SERPL-MCNC: 21 MG/DL (ref 8–28)
BUN SERPL-MCNC: 24 MG/DL (ref 8–28)
BUN SERPL-MCNC: 27 MG/DL (ref 7–30)
BUN SERPL-MCNC: 38 MG/DL (ref 7–30)
CALCIUM SERPL-MCNC: 8.2 MG/DL (ref 8.5–10.1)
CALCIUM SERPL-MCNC: 8.8 MG/DL (ref 8.5–10.5)
CALCIUM SERPL-MCNC: 8.9 MG/DL (ref 8.5–10.1)
CALCIUM SERPL-MCNC: 8.9 MG/DL (ref 8.5–10.5)
CALCIUM SERPL-MCNC: 8.9 MG/DL (ref 8.5–10.5)
CALCIUM SERPL-MCNC: 9 MG/DL (ref 8.5–10.5)
CALCIUM SERPL-MCNC: 9.1 MG/DL (ref 8.5–10.5)
CALCIUM SERPL-MCNC: 9.1 MG/DL (ref 8.5–10.5)
CALCIUM SERPL-MCNC: 9.2 MG/DL (ref 8.5–10.5)
CHLORIDE BLD-SCNC: 101 MMOL/L (ref 98–107)
CHLORIDE BLD-SCNC: 102 MMOL/L (ref 98–107)
CHLORIDE BLD-SCNC: 103 MMOL/L (ref 94–109)
CHLORIDE BLD-SCNC: 103 MMOL/L (ref 98–107)
CHLORIDE BLD-SCNC: 104 MMOL/L (ref 94–109)
CHLORIDE BLD-SCNC: 104 MMOL/L (ref 98–107)
CHLORIDE BLD-SCNC: 105 MMOL/L (ref 98–107)
CHLORIDE SERPLBLD-SCNC: 103 MMOL/L (ref 98–107)
CHLORIDE SERPLBLD-SCNC: 106 MMOL/L (ref 98–107)
CO2 SERPL-SCNC: 26 MMOL/L (ref 22–31)
CO2 SERPL-SCNC: 27 MMOL/L (ref 22–31)
CO2 SERPL-SCNC: 28 MMOL/L (ref 20–32)
CO2 SERPL-SCNC: 29 MMOL/L (ref 20–32)
CO2 SERPL-SCNC: 29 MMOL/L (ref 22–31)
CO2 SERPL-SCNC: 32 MMOL/L (ref 22–31)
CO2 SERPL-SCNC: 32 MMOL/L (ref 22–31)
COLOR UR AUTO: ABNORMAL
COLOR UR AUTO: ABNORMAL
COLOR UR AUTO: YELLOW
CREAT SERPL-MCNC: 0.77 MG/DL (ref 0.6–1.1)
CREAT SERPL-MCNC: 0.77 MG/DL (ref 0.6–1.1)
CREAT SERPL-MCNC: 0.81 MG/DL (ref 0.6–1.1)
CREAT SERPL-MCNC: 0.81 MG/DL (ref 0.6–1.1)
CREAT SERPL-MCNC: 0.83 MG/DL (ref 0.6–1.1)
CREAT SERPL-MCNC: 0.89 MG/DL (ref 0.52–1.04)
CREAT SERPL-MCNC: 0.96 MG/DL (ref 0.52–1.04)
DIASTOLIC BLOOD PRESSURE - MUSE: NORMAL MMHG
DIFFERENTIAL: ABNORMAL
EOSINOPHIL # BLD AUTO: 0.1 10E3/UL (ref 0–0.7)
EOSINOPHIL # BLD AUTO: 0.5 THOU/UL (ref 0–0.4)
EOSINOPHIL # BLD AUTO: 0.5 THOU/UL (ref 0–0.4)
EOSINOPHIL NFR BLD AUTO: 1 %
EOSINOPHIL NFR BLD AUTO: 6 % (ref 0–6)
EOSINOPHIL NFR BLD AUTO: 7 % (ref 0–6)
ERYTHROCYTE [DISTWIDTH] IN BLOOD BY AUTOMATED COUNT: 13 % (ref 10–15)
ERYTHROCYTE [DISTWIDTH] IN BLOOD BY AUTOMATED COUNT: 13 % (ref 11–14.5)
ERYTHROCYTE [DISTWIDTH] IN BLOOD BY AUTOMATED COUNT: 13.1 % (ref 10–15)
ERYTHROCYTE [DISTWIDTH] IN BLOOD BY AUTOMATED COUNT: 13.2 % (ref 10–15)
ERYTHROCYTE [DISTWIDTH] IN BLOOD BY AUTOMATED COUNT: 13.2 % (ref 10–15)
ERYTHROCYTE [DISTWIDTH] IN BLOOD BY AUTOMATED COUNT: 13.2 % (ref 11–14.5)
ERYTHROCYTE [DISTWIDTH] IN BLOOD BY AUTOMATED COUNT: 13.3 % (ref 11–14.5)
ERYTHROCYTE [DISTWIDTH] IN BLOOD BY AUTOMATED COUNT: 13.3 % (ref 11–14.5)
GFR SERPL CREATININE-BSD FRML MDRD: 52 ML/MIN/1.73M2
GFR SERPL CREATININE-BSD FRML MDRD: 57 ML/MIN/1.73M2
GFR SERPL CREATININE-BSD FRML MDRD: 62 ML/MIN/1.73M2
GFR SERPL CREATININE-BSD FRML MDRD: >60 ML/MIN/1.73M2
GLUCOSE BLD-MCNC: 102 MG/DL (ref 70–125)
GLUCOSE BLD-MCNC: 133 MG/DL (ref 70–99)
GLUCOSE BLD-MCNC: 150 MG/DL (ref 70–125)
GLUCOSE BLD-MCNC: 192 MG/DL (ref 70–99)
GLUCOSE BLD-MCNC: 93 MG/DL (ref 70–125)
GLUCOSE BLD-MCNC: 97 MG/DL (ref 70–125)
GLUCOSE BLD-MCNC: 99 MG/DL (ref 70–125)
GLUCOSE SERPL-MCNC: 150 MG/DL (ref 70–125)
GLUCOSE SERPL-MCNC: 97 MG/DL (ref 70–125)
GLUCOSE UR STRIP-MCNC: NEGATIVE MG/DL
HCO3 BLDV-SCNC: 27 MMOL/L (ref 21–28)
HCO3 BLDV-SCNC: 33 MMOL/L (ref 21–28)
HCO3 BLDV-SCNC: 34 MMOL/L (ref 21–28)
HCT VFR BLD AUTO: 29.6 % (ref 35–47)
HCT VFR BLD AUTO: 30.1 % (ref 35–47)
HCT VFR BLD AUTO: 32.3 % (ref 35–47)
HCT VFR BLD AUTO: 33.1 % (ref 35–47)
HCT VFR BLD AUTO: 33.1 % (ref 35–47)
HCT VFR BLD AUTO: 34.5 % (ref 35–47)
HCT VFR BLD AUTO: 37.4 % (ref 35–47)
HCT VFR BLD AUTO: 39 % (ref 35–47)
HCT VFR BLD AUTO: 39 % (ref 35–47)
HCT VFR BLD AUTO: 41.5 % (ref 35–47)
HEMOGLOBIN: 10.2 G/DL (ref 12–16)
HEMOGLOBIN: 12.3 G/DL (ref 12–16)
HGB BLD-MCNC: 10.1 G/DL (ref 11.7–15.7)
HGB BLD-MCNC: 10.2 G/DL (ref 12–16)
HGB BLD-MCNC: 10.4 G/DL (ref 12–16)
HGB BLD-MCNC: 11.7 G/DL (ref 12–16)
HGB BLD-MCNC: 12.3 G/DL (ref 12–16)
HGB BLD-MCNC: 12.9 G/DL (ref 11.7–15.7)
HGB BLD-MCNC: 9.4 G/DL (ref 11.7–15.7)
HGB BLD-MCNC: 9.6 G/DL (ref 11.7–15.7)
HGB UR QL STRIP: NEGATIVE
HOLD SPECIMEN: NORMAL
HYALINE CASTS: 89 /LPF
IMM GRANULOCYTES # BLD: 0 THOU/UL
IMM GRANULOCYTES # BLD: 0 THOU/UL
IMM GRANULOCYTES # BLD: 0.1 10E3/UL
IMM GRANULOCYTES NFR BLD: 0 %
IMM GRANULOCYTES NFR BLD: 0 %
IMM GRANULOCYTES NFR BLD: 1 %
INTERPRETATION ECG - MUSE: NORMAL
KETONES UR STRIP-MCNC: NEGATIVE MG/DL
LACTATE BLD-SCNC: 2.7 MMOL/L
LACTATE BLD-SCNC: 3.3 MMOL/L
LACTATE SERPL-SCNC: 1.8 MMOL/L (ref 0.7–2)
LACTATE SERPL-SCNC: 5.4 MMOL/L (ref 0.7–2)
LACTATE SERPL-SCNC: 5.5 MMOL/L (ref 0.7–2)
LACTATE SERPL-SCNC: 6.3 MMOL/L (ref 0.7–2)
LEUKOCYTE ESTERASE UR QL STRIP: ABNORMAL
LVEF ECHO: NORMAL
LYMPHOCYTES # BLD AUTO: 0.8 THOU/UL (ref 0.8–4.4)
LYMPHOCYTES # BLD AUTO: 0.9 THOU/UL (ref 0.8–4.4)
LYMPHOCYTES # BLD AUTO: 1.1 10E3/UL (ref 0.8–5.3)
LYMPHOCYTES NFR BLD AUTO: 12 % (ref 20–40)
LYMPHOCYTES NFR BLD AUTO: 12 % (ref 20–40)
LYMPHOCYTES NFR BLD AUTO: 7 %
MCH RBC QN AUTO: 29.2 PG (ref 27–34)
MCH RBC QN AUTO: 29.6 PG (ref 26.5–33)
MCH RBC QN AUTO: 29.7 PG (ref 27–34)
MCH RBC QN AUTO: 29.7 PG (ref 27–34)
MCH RBC QN AUTO: 30.1 PG (ref 27–34)
MCH RBC QN AUTO: 30.1 PG (ref 27–34)
MCH RBC QN AUTO: 30.2 PG (ref 27–34)
MCH RBC QN AUTO: 30.3 PG (ref 26.5–33)
MCH RBC QN AUTO: 30.8 PG (ref 26.5–33)
MCH RBC QN AUTO: 30.9 PG (ref 26.5–33)
MCHC RBC AUTO-ENTMCNC: 30.1 G/DL (ref 32–36)
MCHC RBC AUTO-ENTMCNC: 30.8 G/DL (ref 32–36)
MCHC RBC AUTO-ENTMCNC: 30.8 G/DL (ref 32–36)
MCHC RBC AUTO-ENTMCNC: 31.1 G/DL (ref 31.5–36.5)
MCHC RBC AUTO-ENTMCNC: 31.2 G/DL (ref 31.5–36.5)
MCHC RBC AUTO-ENTMCNC: 31.3 G/DL (ref 31.5–36.5)
MCHC RBC AUTO-ENTMCNC: 31.3 G/DL (ref 32–36)
MCHC RBC AUTO-ENTMCNC: 31.5 G/DL (ref 32–36)
MCHC RBC AUTO-ENTMCNC: 31.5 G/DL (ref 32–36)
MCHC RBC AUTO-ENTMCNC: 32.4 G/DL (ref 31.5–36.5)
MCV RBC AUTO: 95 FL (ref 78–100)
MCV RBC AUTO: 95 FL (ref 78–100)
MCV RBC AUTO: 95 FL (ref 80–100)
MCV RBC AUTO: 95 FL (ref 80–100)
MCV RBC AUTO: 96 FL (ref 80–100)
MCV RBC AUTO: 97 FL (ref 78–100)
MCV RBC AUTO: 97 FL (ref 80–100)
MCV RBC AUTO: 99 FL (ref 78–100)
MONOCYTES # BLD AUTO: 0.3 10E3/UL (ref 0–1.3)
MONOCYTES # BLD AUTO: 0.6 THOU/UL (ref 0–0.9)
MONOCYTES # BLD AUTO: 0.6 THOU/UL (ref 0–0.9)
MONOCYTES NFR BLD AUTO: 2 %
MONOCYTES NFR BLD AUTO: 7 % (ref 2–10)
MONOCYTES NFR BLD AUTO: 9 % (ref 2–10)
MUCOUS THREADS #/AREA URNS LPF: PRESENT /LPF
NEUTROPHILS # BLD AUTO: 13.5 10E3/UL (ref 1.6–8.3)
NEUTROPHILS # BLD AUTO: 4.9 THOU/UL (ref 2–7.7)
NEUTROPHILS # BLD AUTO: 5.5 THOU/UL (ref 2–7.7)
NEUTROPHILS NFR BLD AUTO: 72 % (ref 50–70)
NEUTROPHILS NFR BLD AUTO: 74 % (ref 50–70)
NEUTROPHILS NFR BLD AUTO: 90 %
NITRATE UR QL: NEGATIVE
NRBC # BLD AUTO: 0 10E3/UL
NRBC BLD AUTO-RTO: 0 /100
NT-PROBNP SERPL-MCNC: 2242 PG/ML (ref 0–1800)
O2/TOTAL GAS SETTING VFR VENT: 96 %
P AXIS - MUSE: 85 DEGREES
PCO2 BLDV: 49 MM HG (ref 40–50)
PCO2 BLDV: 69 MM HG (ref 40–50)
PCO2 BLDV: 77 MM HG (ref 40–50)
PH BLDV: 7.26 [PH] (ref 7.32–7.43)
PH BLDV: 7.29 [PH] (ref 7.32–7.43)
PH BLDV: 7.35 [PH] (ref 7.32–7.43)
PH UR STRIP: 5 [PH] (ref 5–7)
PH UR STRIP: 5.5 [PH] (ref 5–7)
PH UR STRIP: 6.5 [PH] (ref 5–7)
PLATELET # BLD AUTO: 198 THOU/UL (ref 140–440)
PLATELET # BLD AUTO: 200 10E3/UL (ref 150–450)
PLATELET # BLD AUTO: 203 THOU/UL (ref 140–440)
PLATELET # BLD AUTO: 205 10E3/UL (ref 150–450)
PLATELET # BLD AUTO: 208 THOU/UL (ref 140–440)
PLATELET # BLD AUTO: 209 10E3/UL (ref 150–450)
PLATELET # BLD AUTO: 248 10E3/UL (ref 150–450)
PLATELET # BLD AUTO: 256 THOU/UL (ref 140–440)
PLATELET # BLD AUTO: 257 THOU/UL (ref 140–440)
PLATELET # BLD AUTO: 257 THOU/UL (ref 140–440)
PMV BLD AUTO: 11.2 FL (ref 8.5–12.5)
PMV BLD AUTO: 11.6 FL (ref 8.5–12.5)
PMV BLD AUTO: 11.7 FL (ref 8.5–12.5)
PMV BLD AUTO: 11.7 FL (ref 8.5–12.5)
PO2 BLDV: 17 MM HG (ref 25–47)
PO2 BLDV: 26 MM HG (ref 25–47)
PO2 BLDV: 26 MM HG (ref 25–47)
POTASSIUM BLD-SCNC: 3.4 MMOL/L (ref 3.4–5.3)
POTASSIUM BLD-SCNC: 3.5 MMOL/L (ref 3.5–5)
POTASSIUM BLD-SCNC: 3.6 MMOL/L (ref 3.4–5.3)
POTASSIUM BLD-SCNC: 3.6 MMOL/L (ref 3.5–5)
POTASSIUM BLD-SCNC: 3.7 MMOL/L (ref 3.4–5.3)
POTASSIUM BLD-SCNC: 3.7 MMOL/L (ref 3.5–5)
POTASSIUM BLD-SCNC: 3.8 MMOL/L (ref 3.5–5)
POTASSIUM BLD-SCNC: 3.8 MMOL/L (ref 3.5–5)
POTASSIUM BLD-SCNC: 4.1 MMOL/L (ref 3.4–5.3)
POTASSIUM SERPL-SCNC: 3.7 MMOL/L (ref 3.5–5)
POTASSIUM SERPL-SCNC: 3.8 MMOL/L (ref 3.5–5)
PR INTERVAL - MUSE: 184 MS
PROCALCITONIN SERPL-MCNC: 4.3 NG/ML
PROCALCITONIN SERPL-MCNC: 7.14 NG/ML
PROCALCITONIN SERPL-MCNC: 7.69 NG/ML
PROT SERPL-MCNC: 7.9 G/DL (ref 6.8–8.8)
QRS DURATION - MUSE: 72 MS
QT - MUSE: 370 MS
QTC - MUSE: 479 MS
R AXIS - MUSE: -26 DEGREES
RBC # BLD AUTO: 3.1 10E6/UL (ref 3.8–5.2)
RBC # BLD AUTO: 3.11 10E6/UL (ref 3.8–5.2)
RBC # BLD AUTO: 3.41 10E6/UL (ref 3.8–5.2)
RBC # BLD AUTO: 3.43 MILL/UL (ref 3.8–5.4)
RBC # BLD AUTO: 3.43 MILL/UL (ref 3.8–5.4)
RBC # BLD AUTO: 3.56 MILL/UL (ref 3.8–5.4)
RBC # BLD AUTO: 3.88 MILL/UL (ref 3.8–5.4)
RBC # BLD AUTO: 4.09 MILL/UL (ref 3.8–5.4)
RBC # BLD AUTO: 4.09 MILL/UL (ref 3.8–5.4)
RBC # BLD AUTO: 4.19 10E6/UL (ref 3.8–5.2)
RBC URINE: 0 /HPF
RBC URINE: 1 /HPF
RBC URINE: 1 /HPF
SAO2 % BLDV: 17 % (ref 94–100)
SAO2 % BLDV: 39 % (ref 94–100)
SARS-COV-2 RNA RESP QL NAA+PROBE: NEGATIVE
SODIUM SERPL-SCNC: 138 MMOL/L (ref 133–144)
SODIUM SERPL-SCNC: 138 MMOL/L (ref 133–144)
SODIUM SERPL-SCNC: 139 MMOL/L (ref 136–145)
SODIUM SERPL-SCNC: 140 MMOL/L (ref 136–145)
SODIUM SERPL-SCNC: 141 MMOL/L (ref 136–141)
SODIUM SERPL-SCNC: 141 MMOL/L (ref 136–145)
SODIUM SERPL-SCNC: 141 MMOL/L (ref 136–145)
SODIUM SERPL-SCNC: 142 MMOL/L (ref 136–145)
SODIUM SERPL-SCNC: 142 MMOL/L (ref 136–145)
SP GR UR STRIP: 1.01 (ref 1–1.03)
SP GR UR STRIP: 1.01 (ref 1–1.03)
SP GR UR STRIP: 1.03 (ref 1–1.03)
SQUAMOUS EPITHELIAL: 22 /HPF
SQUAMOUS EPITHELIAL: 4 /HPF
SQUAMOUS EPITHELIAL: 6 /HPF
SYSTOLIC BLOOD PRESSURE - MUSE: NORMAL MMHG
T AXIS - MUSE: 75 DEGREES
TRANSITIONAL EPI: 1 /HPF
TRANSITIONAL EPI: <1 /HPF
TROPONIN I SERPL-MCNC: <0.015 UG/L (ref 0–0.04)
TROPONIN I SERPL-MCNC: <0.015 UG/L (ref 0–0.04)
UROBILINOGEN UR STRIP-MCNC: <2 MG/DL
UROBILINOGEN UR STRIP-MCNC: <2 MG/DL
UROBILINOGEN UR STRIP-MCNC: NORMAL MG/DL
VENTRICULAR RATE- MUSE: 101 BPM
WBC # BLD AUTO: 15.1 10E3/UL (ref 4–11)
WBC # BLD AUTO: 17.9 10E3/UL (ref 4–11)
WBC # BLD AUTO: 5.1 10E3/UL (ref 4–11)
WBC # BLD AUTO: 6.1 THOU/UL (ref 4–11)
WBC # BLD AUTO: 7.4 THOU/UL (ref 4–11)
WBC # BLD AUTO: 9.8 10E3/UL (ref 4–11)
WBC CLUMPS #/AREA URNS HPF: PRESENT /HPF
WBC CLUMPS #/AREA URNS HPF: PRESENT /HPF
WBC URINE: 14 /HPF
WBC URINE: 52 /HPF
WBC URINE: >182 /HPF
WBC: 11 THOU/UL (ref 4–11)
WBC: 6.1 THOU/UL (ref 4–11)
WBC: 6.8 THOU/UL (ref 4–11)
WBC: 7.4 THOU/UL (ref 4–11)

## 2021-01-01 PROCEDURE — 250N000009 HC RX 250: Performed by: EMERGENCY MEDICINE

## 2021-01-01 PROCEDURE — 99222 1ST HOSP IP/OBS MODERATE 55: CPT | Mod: 25 | Performed by: INTERNAL MEDICINE

## 2021-01-01 PROCEDURE — 96365 THER/PROPH/DIAG IV INF INIT: CPT

## 2021-01-01 PROCEDURE — 82803 BLOOD GASES ANY COMBINATION: CPT | Performed by: NURSE PRACTITIONER

## 2021-01-01 PROCEDURE — 97530 THERAPEUTIC ACTIVITIES: CPT | Mod: GP

## 2021-01-01 PROCEDURE — G0439 PPPS, SUBSEQ VISIT: HCPCS | Performed by: NURSE PRACTITIONER

## 2021-01-01 PROCEDURE — 36415 COLL VENOUS BLD VENIPUNCTURE: CPT | Performed by: INTERNAL MEDICINE

## 2021-01-01 PROCEDURE — 84484 ASSAY OF TROPONIN QUANT: CPT | Performed by: EMERGENCY MEDICINE

## 2021-01-01 PROCEDURE — 84132 ASSAY OF SERUM POTASSIUM: CPT | Performed by: INTERNAL MEDICINE

## 2021-01-01 PROCEDURE — 97161 PT EVAL LOW COMPLEX 20 MIN: CPT | Mod: GP

## 2021-01-01 PROCEDURE — 250N000011 HC RX IP 250 OP 636: Performed by: INTERNAL MEDICINE

## 2021-01-01 PROCEDURE — 99309 SBSQ NF CARE MODERATE MDM 30: CPT | Performed by: NURSE PRACTITIONER

## 2021-01-01 PROCEDURE — 99291 CRITICAL CARE FIRST HOUR: CPT | Mod: 25

## 2021-01-01 PROCEDURE — 83605 ASSAY OF LACTIC ACID: CPT | Performed by: NURSE PRACTITIONER

## 2021-01-01 PROCEDURE — 99232 SBSQ HOSP IP/OBS MODERATE 35: CPT | Performed by: INTERNAL MEDICINE

## 2021-01-01 PROCEDURE — 84145 PROCALCITONIN (PCT): CPT | Performed by: INTERNAL MEDICINE

## 2021-01-01 PROCEDURE — 5A09357 ASSISTANCE WITH RESPIRATORY VENTILATION, LESS THAN 24 CONSECUTIVE HOURS, CONTINUOUS POSITIVE AIRWAY PRESSURE: ICD-10-PCS | Performed by: INTERNAL MEDICINE

## 2021-01-01 PROCEDURE — 71045 X-RAY EXAM CHEST 1 VIEW: CPT

## 2021-01-01 PROCEDURE — 250N000013 HC RX MED GY IP 250 OP 250 PS 637: Performed by: INTERNAL MEDICINE

## 2021-01-01 PROCEDURE — 81001 URINALYSIS AUTO W/SCOPE: CPT | Mod: ORL | Performed by: NURSE PRACTITIONER

## 2021-01-01 PROCEDURE — 36415 COLL VENOUS BLD VENIPUNCTURE: CPT | Performed by: NURSE PRACTITIONER

## 2021-01-01 PROCEDURE — 84484 ASSAY OF TROPONIN QUANT: CPT | Performed by: INTERNAL MEDICINE

## 2021-01-01 PROCEDURE — 80053 COMPREHEN METABOLIC PANEL: CPT | Performed by: EMERGENCY MEDICINE

## 2021-01-01 PROCEDURE — 96368 THER/DIAG CONCURRENT INF: CPT

## 2021-01-01 PROCEDURE — 96375 TX/PRO/DX INJ NEW DRUG ADDON: CPT

## 2021-01-01 PROCEDURE — 99207 PR NO CHARGE LOS: CPT | Performed by: INTERNAL MEDICINE

## 2021-01-01 PROCEDURE — 36415 COLL VENOUS BLD VENIPUNCTURE: CPT | Mod: ORL | Performed by: NURSE PRACTITIONER

## 2021-01-01 PROCEDURE — 85027 COMPLETE CBC AUTOMATED: CPT | Mod: ORL | Performed by: NURSE PRACTITIONER

## 2021-01-01 PROCEDURE — 87040 BLOOD CULTURE FOR BACTERIA: CPT | Performed by: INTERNAL MEDICINE

## 2021-01-01 PROCEDURE — 80048 BASIC METABOLIC PNL TOTAL CA: CPT | Mod: ORL | Performed by: NURSE PRACTITIONER

## 2021-01-01 PROCEDURE — 99239 HOSP IP/OBS DSCHRG MGMT >30: CPT | Performed by: INTERNAL MEDICINE

## 2021-01-01 PROCEDURE — 87635 SARS-COV-2 COVID-19 AMP PRB: CPT | Performed by: EMERGENCY MEDICINE

## 2021-01-01 PROCEDURE — 210N000002 HC R&B HEART CARE

## 2021-01-01 PROCEDURE — 250N000011 HC RX IP 250 OP 636

## 2021-01-01 PROCEDURE — 97116 GAIT TRAINING THERAPY: CPT | Mod: GP

## 2021-01-01 PROCEDURE — 96366 THER/PROPH/DIAG IV INF ADDON: CPT

## 2021-01-01 PROCEDURE — 71250 CT THORAX DX C-: CPT | Mod: MG

## 2021-01-01 PROCEDURE — 80048 BASIC METABOLIC PNL TOTAL CA: CPT | Performed by: INTERNAL MEDICINE

## 2021-01-01 PROCEDURE — 82803 BLOOD GASES ANY COMBINATION: CPT

## 2021-01-01 PROCEDURE — 87086 URINE CULTURE/COLONY COUNT: CPT | Mod: ORL | Performed by: NURSE PRACTITIONER

## 2021-01-01 PROCEDURE — 999N000157 HC STATISTIC RCP TIME EA 10 MIN

## 2021-01-01 PROCEDURE — 99497 ADVNCD CARE PLAN 30 MIN: CPT | Performed by: NURSE PRACTITIONER

## 2021-01-01 PROCEDURE — 93005 ELECTROCARDIOGRAM TRACING: CPT

## 2021-01-01 PROCEDURE — 93306 TTE W/DOPPLER COMPLETE: CPT | Mod: 26 | Performed by: INTERNAL MEDICINE

## 2021-01-01 PROCEDURE — 76604 US EXAM CHEST: CPT

## 2021-01-01 PROCEDURE — 99207 PR APP CREDIT; MD BILLING SHARED VISIT: CPT | Performed by: INTERNAL MEDICINE

## 2021-01-01 PROCEDURE — 93306 TTE W/DOPPLER COMPLETE: CPT

## 2021-01-01 PROCEDURE — 83605 ASSAY OF LACTIC ACID: CPT | Performed by: INTERNAL MEDICINE

## 2021-01-01 PROCEDURE — 250N000011 HC RX IP 250 OP 636: Performed by: EMERGENCY MEDICINE

## 2021-01-01 PROCEDURE — 85027 COMPLETE CBC AUTOMATED: CPT | Performed by: INTERNAL MEDICINE

## 2021-01-01 PROCEDURE — 85025 COMPLETE CBC W/AUTO DIFF WBC: CPT | Performed by: EMERGENCY MEDICINE

## 2021-01-01 PROCEDURE — 94660 CPAP INITIATION&MGMT: CPT

## 2021-01-01 PROCEDURE — 99309 SBSQ NF CARE MODERATE MDM 30: CPT | Mod: 25 | Performed by: NURSE PRACTITIONER

## 2021-01-01 PROCEDURE — C9803 HOPD COVID-19 SPEC COLLECT: HCPCS

## 2021-01-01 PROCEDURE — 84145 PROCALCITONIN (PCT): CPT | Performed by: NURSE PRACTITIONER

## 2021-01-01 PROCEDURE — 87086 URINE CULTURE/COLONY COUNT: CPT | Performed by: NURSE PRACTITIONER

## 2021-01-01 PROCEDURE — 94640 AIRWAY INHALATION TREATMENT: CPT

## 2021-01-01 PROCEDURE — 36415 COLL VENOUS BLD VENIPUNCTURE: CPT | Performed by: EMERGENCY MEDICINE

## 2021-01-01 PROCEDURE — 81001 URINALYSIS AUTO W/SCOPE: CPT | Performed by: NURSE PRACTITIONER

## 2021-01-01 PROCEDURE — 83880 ASSAY OF NATRIURETIC PEPTIDE: CPT | Performed by: EMERGENCY MEDICINE

## 2021-01-01 PROCEDURE — 99223 1ST HOSP IP/OBS HIGH 75: CPT | Mod: AI | Performed by: INTERNAL MEDICINE

## 2021-01-01 PROCEDURE — 99292 CRITICAL CARE ADDL 30 MIN: CPT

## 2021-01-01 RX ORDER — CEFPODOXIME PROXETIL 200 MG/1
200 TABLET, FILM COATED ORAL 2 TIMES DAILY
Qty: 6 TABLET | Refills: 0 | DISCHARGE
Start: 2021-01-01 | End: 2021-01-01

## 2021-01-01 RX ORDER — METOPROLOL TARTRATE 25 MG/1
37.5 TABLET, FILM COATED ORAL 2 TIMES DAILY
Qty: 45 TABLET | Refills: 11 | Status: SHIPPED | OUTPATIENT
Start: 2021-01-01 | End: 2022-01-01 | Stop reason: DRUGHIGH

## 2021-01-01 RX ORDER — CLOPIDOGREL BISULFATE 75 MG/1
75 TABLET ORAL DAILY
Status: DISCONTINUED | OUTPATIENT
Start: 2021-01-01 | End: 2021-01-01 | Stop reason: HOSPADM

## 2021-01-01 RX ORDER — AMOXICILLIN 250 MG
1 CAPSULE ORAL 2 TIMES DAILY PRN
Status: DISCONTINUED | OUTPATIENT
Start: 2021-01-01 | End: 2021-01-01 | Stop reason: HOSPADM

## 2021-01-01 RX ORDER — ESCITALOPRAM OXALATE 5 MG/1
5 TABLET ORAL DAILY
Status: DISCONTINUED | OUTPATIENT
Start: 2021-01-01 | End: 2021-01-01 | Stop reason: HOSPADM

## 2021-01-01 RX ORDER — ONDANSETRON 4 MG/1
4 TABLET, ORALLY DISINTEGRATING ORAL EVERY 6 HOURS PRN
Status: DISCONTINUED | OUTPATIENT
Start: 2021-01-01 | End: 2021-01-01 | Stop reason: HOSPADM

## 2021-01-01 RX ORDER — ACETAMINOPHEN 650 MG/1
650 SUPPOSITORY RECTAL EVERY 6 HOURS PRN
Status: DISCONTINUED | OUTPATIENT
Start: 2021-01-01 | End: 2021-01-01 | Stop reason: HOSPADM

## 2021-01-01 RX ORDER — ZOLPIDEM TARTRATE 5 MG/1
2.5 TABLET ORAL
Qty: 40 TABLET | Refills: 0 | Status: SHIPPED | OUTPATIENT
Start: 2021-01-01 | End: 2021-01-01

## 2021-01-01 RX ORDER — LOSARTAN POTASSIUM 50 MG/1
100 TABLET ORAL DAILY
Qty: 30 TABLET | Refills: 1 | Status: SHIPPED | OUTPATIENT
Start: 2021-01-01

## 2021-01-01 RX ORDER — METOPROLOL TARTRATE 25 MG/1
25 TABLET, FILM COATED ORAL 2 TIMES DAILY
COMMUNITY
Start: 2021-01-01 | End: 2021-01-01

## 2021-01-01 RX ORDER — FUROSEMIDE 10 MG/ML
40 INJECTION INTRAMUSCULAR; INTRAVENOUS ONCE
Status: COMPLETED | OUTPATIENT
Start: 2021-01-01 | End: 2021-01-01

## 2021-01-01 RX ORDER — ACETAMINOPHEN 325 MG/1
650 TABLET ORAL EVERY 6 HOURS PRN
Status: DISCONTINUED | OUTPATIENT
Start: 2021-01-01 | End: 2021-01-01 | Stop reason: HOSPADM

## 2021-01-01 RX ORDER — ZOLPIDEM TARTRATE 5 MG/1
5 TABLET ORAL AT BEDTIME
Qty: 15 TABLET | Refills: 0 | Status: SHIPPED | OUTPATIENT
Start: 2021-01-01 | End: 2021-01-01

## 2021-01-01 RX ORDER — CEFTRIAXONE 1 G/1
1 INJECTION, POWDER, FOR SOLUTION INTRAMUSCULAR; INTRAVENOUS EVERY 24 HOURS
Status: DISCONTINUED | OUTPATIENT
Start: 2021-01-01 | End: 2021-01-01 | Stop reason: HOSPADM

## 2021-01-01 RX ORDER — NYSTATIN 100000 [USP'U]/G
POWDER TOPICAL 2 TIMES DAILY
COMMUNITY

## 2021-01-01 RX ORDER — ZOLPIDEM TARTRATE 5 MG/1
5 TABLET ORAL AT BEDTIME
Qty: 30 TABLET | Refills: 1 | Status: ON HOLD | OUTPATIENT
Start: 2021-01-01 | End: 2021-01-01

## 2021-01-01 RX ORDER — ONDANSETRON 2 MG/ML
4 INJECTION INTRAMUSCULAR; INTRAVENOUS EVERY 6 HOURS PRN
Status: DISCONTINUED | OUTPATIENT
Start: 2021-01-01 | End: 2021-01-01 | Stop reason: HOSPADM

## 2021-01-01 RX ORDER — GABAPENTIN 100 MG/1
100 CAPSULE ORAL
Qty: 30 CAPSULE | Refills: 1 | Status: SHIPPED | OUTPATIENT
Start: 2021-01-01 | End: 2021-01-01

## 2021-01-01 RX ORDER — LIDOCAINE 40 MG/G
CREAM TOPICAL
Status: DISCONTINUED | OUTPATIENT
Start: 2021-01-01 | End: 2021-01-01 | Stop reason: HOSPADM

## 2021-01-01 RX ORDER — BENZOCAINE/MENTHOL 6 MG-10 MG
LOZENGE MUCOUS MEMBRANE 2 TIMES DAILY
COMMUNITY

## 2021-01-01 RX ORDER — AMOXICILLIN 250 MG
2 CAPSULE ORAL 2 TIMES DAILY PRN
Status: DISCONTINUED | OUTPATIENT
Start: 2021-01-01 | End: 2021-01-01 | Stop reason: HOSPADM

## 2021-01-01 RX ORDER — ZOLPIDEM TARTRATE 5 MG/1
5 TABLET ORAL AT BEDTIME
Qty: 30 TABLET | Refills: 0 | Status: SHIPPED | OUTPATIENT
Start: 2021-01-01 | End: 2021-01-01

## 2021-01-01 RX ORDER — CEPHALEXIN 500 MG/1
500 CAPSULE ORAL 2 TIMES DAILY
COMMUNITY
Start: 2021-01-01 | End: 2021-01-01

## 2021-01-01 RX ORDER — PANTOPRAZOLE SODIUM 40 MG/1
40 TABLET, DELAYED RELEASE ORAL DAILY
Status: DISCONTINUED | OUTPATIENT
Start: 2021-01-01 | End: 2021-01-01 | Stop reason: HOSPADM

## 2021-01-01 RX ORDER — CEFTRIAXONE 1 G/1
1 INJECTION, POWDER, FOR SOLUTION INTRAMUSCULAR; INTRAVENOUS ONCE
Status: COMPLETED | OUTPATIENT
Start: 2021-01-01 | End: 2021-01-01

## 2021-01-01 RX ORDER — HYDROCHLOROTHIAZIDE 25 MG/1
12.5 TABLET ORAL DAILY
Refills: 0 | DISCHARGE
Start: 2021-01-01 | End: 2022-01-01

## 2021-01-01 RX ORDER — METOPROLOL TARTRATE 50 MG
50 TABLET ORAL 2 TIMES DAILY
COMMUNITY
Start: 2021-01-01 | End: 2021-01-01

## 2021-01-01 RX ORDER — ALBUTEROL SULFATE 0.83 MG/ML
2.5 SOLUTION RESPIRATORY (INHALATION) EVERY 4 HOURS PRN
Status: DISCONTINUED | OUTPATIENT
Start: 2021-01-01 | End: 2021-01-01 | Stop reason: HOSPADM

## 2021-01-01 RX ORDER — ZOLPIDEM TARTRATE 5 MG/1
5 TABLET ORAL AT BEDTIME
Qty: 30 TABLET | Refills: 1 | Status: SHIPPED | OUTPATIENT
Start: 2021-01-01 | End: 2021-01-01

## 2021-01-01 RX ORDER — ALBUTEROL SULFATE 90 UG/1
2 AEROSOL, METERED RESPIRATORY (INHALATION) EVERY 4 HOURS PRN
COMMUNITY

## 2021-01-01 RX ORDER — HYDROCHLOROTHIAZIDE 25 MG/1
25 TABLET ORAL DAILY
Status: DISCONTINUED | OUTPATIENT
Start: 2021-01-01 | End: 2021-01-01 | Stop reason: HOSPADM

## 2021-01-01 RX ORDER — ZOLPIDEM TARTRATE 5 MG/1
5 TABLET ORAL AT BEDTIME
Qty: 30 TABLET | Refills: 1 | COMMUNITY
Start: 2021-01-01 | End: 2021-01-01

## 2021-01-01 RX ORDER — IPRATROPIUM BROMIDE AND ALBUTEROL SULFATE 2.5; .5 MG/3ML; MG/3ML
3 SOLUTION RESPIRATORY (INHALATION) ONCE
Status: COMPLETED | OUTPATIENT
Start: 2021-01-01 | End: 2021-01-01

## 2021-01-01 RX ORDER — METOPROLOL TARTRATE 25 MG/1
25 TABLET, FILM COATED ORAL 2 TIMES DAILY
Status: DISCONTINUED | OUTPATIENT
Start: 2021-01-01 | End: 2021-01-01

## 2021-01-01 RX ORDER — ROSUVASTATIN CALCIUM 5 MG/1
5 TABLET, COATED ORAL EVERY OTHER DAY
Status: DISCONTINUED | OUTPATIENT
Start: 2021-01-01 | End: 2021-01-01 | Stop reason: HOSPADM

## 2021-01-01 RX ORDER — NITROFURANTOIN 25; 75 MG/1; MG/1
100 CAPSULE ORAL 2 TIMES DAILY
Qty: 10 CAPSULE | Refills: 0 | Status: SHIPPED | OUTPATIENT
Start: 2021-01-01 | End: 2021-01-01

## 2021-01-01 RX ORDER — ZOLPIDEM TARTRATE 5 MG/1
5 TABLET ORAL AT BEDTIME
Qty: 30 TABLET | Refills: 0 | Status: SHIPPED | OUTPATIENT
Start: 2021-01-01 | End: 2022-01-01

## 2021-01-01 RX ORDER — ONDANSETRON 2 MG/ML
INJECTION INTRAMUSCULAR; INTRAVENOUS
Status: COMPLETED
Start: 2021-01-01 | End: 2021-01-01

## 2021-01-01 RX ORDER — ZOLPIDEM TARTRATE 5 MG/1
2.5 TABLET ORAL
Qty: 180 TABLET | Refills: 0 | Status: SHIPPED | OUTPATIENT
Start: 2021-01-01 | End: 2021-01-01

## 2021-01-01 RX ORDER — ISOSORBIDE MONONITRATE 30 MG/1
TABLET, EXTENDED RELEASE ORAL
Qty: 30 TABLET | Refills: 4 | Status: SHIPPED | OUTPATIENT
Start: 2021-01-01 | End: 2022-01-01 | Stop reason: DRUGHIGH

## 2021-01-01 RX ORDER — ZOLPIDEM TARTRATE 5 MG/1
2.5 TABLET ORAL
Qty: 180 TABLET | Refills: 0 | Status: CANCELLED | OUTPATIENT
Start: 2021-01-01

## 2021-01-01 RX ORDER — ONDANSETRON 2 MG/ML
4 INJECTION INTRAMUSCULAR; INTRAVENOUS ONCE
Status: COMPLETED | OUTPATIENT
Start: 2021-01-01 | End: 2021-01-01

## 2021-01-01 RX ORDER — ZOLPIDEM TARTRATE 5 MG/1
5 TABLET ORAL AT BEDTIME
Status: DISCONTINUED | OUTPATIENT
Start: 2021-01-01 | End: 2021-01-01 | Stop reason: HOSPADM

## 2021-01-01 RX ORDER — METHYLPREDNISOLONE SODIUM SUCCINATE 125 MG/2ML
125 INJECTION, POWDER, LYOPHILIZED, FOR SOLUTION INTRAMUSCULAR; INTRAVENOUS ONCE
Status: COMPLETED | OUTPATIENT
Start: 2021-01-01 | End: 2021-01-01

## 2021-01-01 RX ORDER — VITAMIN B COMPLEX
25 TABLET ORAL DAILY
Status: DISCONTINUED | OUTPATIENT
Start: 2021-01-01 | End: 2021-01-01 | Stop reason: HOSPADM

## 2021-01-01 RX ORDER — ALBUTEROL SULFATE 90 UG/1
2 AEROSOL, METERED RESPIRATORY (INHALATION) EVERY 4 HOURS PRN
Status: ON HOLD | COMMUNITY
End: 2021-01-01

## 2021-01-01 RX ORDER — FUROSEMIDE 10 MG/ML
20 INJECTION INTRAMUSCULAR; INTRAVENOUS ONCE
Status: COMPLETED | OUTPATIENT
Start: 2021-01-01 | End: 2021-01-01

## 2021-01-01 RX ORDER — NITROGLYCERIN 20 MG/100ML
10-200 INJECTION INTRAVENOUS CONTINUOUS
Status: DISCONTINUED | OUTPATIENT
Start: 2021-01-01 | End: 2021-01-01

## 2021-01-01 RX ORDER — MUSCLE RUB CREAM 100; 150 MG/G; MG/G
CREAM TOPICAL
COMMUNITY

## 2021-01-01 RX ORDER — NITROFURANTOIN 25; 75 MG/1; MG/1
100 CAPSULE ORAL 2 TIMES DAILY
Qty: 14 CAPSULE | Refills: 0 | Status: SHIPPED | OUTPATIENT
Start: 2021-01-01 | End: 2021-01-01

## 2021-01-01 RX ORDER — LOSARTAN POTASSIUM 100 MG/1
100 TABLET ORAL DAILY
Status: DISCONTINUED | OUTPATIENT
Start: 2021-01-01 | End: 2021-01-01 | Stop reason: HOSPADM

## 2021-01-01 RX ADMIN — UMECLIDINIUM 1 PUFF: 62.5 AEROSOL, POWDER ORAL at 11:01

## 2021-01-01 RX ADMIN — Medication 25 MCG: at 10:13

## 2021-01-01 RX ADMIN — ONDANSETRON 4 MG: 2 INJECTION INTRAMUSCULAR; INTRAVENOUS at 01:34

## 2021-01-01 RX ADMIN — CEFTRIAXONE SODIUM 1 G: 1 INJECTION, POWDER, FOR SOLUTION INTRAMUSCULAR; INTRAVENOUS at 03:49

## 2021-01-01 RX ADMIN — UMECLIDINIUM 1 PUFF: 62.5 AEROSOL, POWDER ORAL at 10:15

## 2021-01-01 RX ADMIN — CLOPIDOGREL BISULFATE 75 MG: 75 TABLET ORAL at 09:10

## 2021-01-01 RX ADMIN — METOPROLOL TARTRATE 25 MG: 25 TABLET, FILM COATED ORAL at 17:02

## 2021-01-01 RX ADMIN — ROSUVASTATIN CALCIUM 5 MG: 5 TABLET, FILM COATED ORAL at 20:10

## 2021-01-01 RX ADMIN — ESCITALOPRAM 5 MG: 5 TABLET, FILM COATED ORAL at 10:44

## 2021-01-01 RX ADMIN — FLUTICASONE FUROATE AND VILANTEROL TRIFENATATE 1 PUFF: 200; 25 POWDER RESPIRATORY (INHALATION) at 09:15

## 2021-01-01 RX ADMIN — Medication 1 TABLET: at 10:46

## 2021-01-01 RX ADMIN — CEFTRIAXONE SODIUM 1 G: 1 INJECTION, POWDER, FOR SOLUTION INTRAMUSCULAR; INTRAVENOUS at 04:38

## 2021-01-01 RX ADMIN — LOSARTAN POTASSIUM 100 MG: 100 TABLET, FILM COATED ORAL at 10:46

## 2021-01-01 RX ADMIN — METOPROLOL TARTRATE 37.5 MG: 25 TABLET, FILM COATED ORAL at 08:19

## 2021-01-01 RX ADMIN — NITROGLYCERIN 50 MCG/MIN: 20 INJECTION INTRAVENOUS at 01:56

## 2021-01-01 RX ADMIN — METOPROLOL TARTRATE 25 MG: 25 TABLET, FILM COATED ORAL at 21:49

## 2021-01-01 RX ADMIN — ISOSORBIDE MONONITRATE 45 MG: 30 TABLET, EXTENDED RELEASE ORAL at 08:19

## 2021-01-01 RX ADMIN — IPRATROPIUM BROMIDE AND ALBUTEROL SULFATE 3 ML: .5; 3 SOLUTION RESPIRATORY (INHALATION) at 01:45

## 2021-01-01 RX ADMIN — METHYLPREDNISOLONE SODIUM SUCCINATE 125 MG: 125 INJECTION, POWDER, FOR SOLUTION INTRAMUSCULAR; INTRAVENOUS at 01:38

## 2021-01-01 RX ADMIN — Medication 25 MCG: at 09:10

## 2021-01-01 RX ADMIN — CLOPIDOGREL BISULFATE 75 MG: 75 TABLET ORAL at 08:19

## 2021-01-01 RX ADMIN — PANTOPRAZOLE SODIUM 40 MG: 40 TABLET, DELAYED RELEASE ORAL at 10:44

## 2021-01-01 RX ADMIN — ZOLPIDEM TARTRATE 5 MG: 5 TABLET ORAL at 20:11

## 2021-01-01 RX ADMIN — PANTOPRAZOLE SODIUM 40 MG: 40 TABLET, DELAYED RELEASE ORAL at 08:19

## 2021-01-01 RX ADMIN — HYDROCHLOROTHIAZIDE 25 MG: 25 TABLET ORAL at 08:35

## 2021-01-01 RX ADMIN — LOSARTAN POTASSIUM 100 MG: 100 TABLET, FILM COATED ORAL at 10:13

## 2021-01-01 RX ADMIN — Medication 1 TABLET: at 10:14

## 2021-01-01 RX ADMIN — LOSARTAN POTASSIUM 100 MG: 100 TABLET, FILM COATED ORAL at 08:19

## 2021-01-01 RX ADMIN — FLUTICASONE FUROATE AND VILANTEROL TRIFENATATE 1 PUFF: 200; 25 POWDER RESPIRATORY (INHALATION) at 10:15

## 2021-01-01 RX ADMIN — ISOSORBIDE MONONITRATE 45 MG: 30 TABLET, EXTENDED RELEASE ORAL at 09:09

## 2021-01-01 RX ADMIN — UMECLIDINIUM 1 PUFF: 62.5 AEROSOL, POWDER ORAL at 08:25

## 2021-01-01 RX ADMIN — FLUTICASONE FUROATE AND VILANTEROL TRIFENATATE 1 PUFF: 200; 25 POWDER RESPIRATORY (INHALATION) at 11:01

## 2021-01-01 RX ADMIN — CLOPIDOGREL BISULFATE 75 MG: 75 TABLET ORAL at 10:46

## 2021-01-01 RX ADMIN — ZOLPIDEM TARTRATE 5 MG: 5 TABLET ORAL at 21:00

## 2021-01-01 RX ADMIN — FLUTICASONE FUROATE AND VILANTEROL TRIFENATATE 1 PUFF: 200; 25 POWDER RESPIRATORY (INHALATION) at 08:25

## 2021-01-01 RX ADMIN — METOPROLOL TARTRATE 25 MG: 25 TABLET, FILM COATED ORAL at 09:10

## 2021-01-01 RX ADMIN — Medication 1 TABLET: at 08:19

## 2021-01-01 RX ADMIN — ESCITALOPRAM 5 MG: 5 TABLET, FILM COATED ORAL at 10:13

## 2021-01-01 RX ADMIN — FUROSEMIDE 40 MG: 10 INJECTION, SOLUTION INTRAVENOUS at 06:49

## 2021-01-01 RX ADMIN — Medication 1 TABLET: at 09:10

## 2021-01-01 RX ADMIN — METOPROLOL TARTRATE 25 MG: 25 TABLET, FILM COATED ORAL at 10:44

## 2021-01-01 RX ADMIN — METOPROLOL TARTRATE 25 MG: 25 TABLET, FILM COATED ORAL at 20:55

## 2021-01-01 RX ADMIN — Medication 25 MCG: at 08:19

## 2021-01-01 RX ADMIN — PANTOPRAZOLE SODIUM 40 MG: 40 TABLET, DELAYED RELEASE ORAL at 10:13

## 2021-01-01 RX ADMIN — ISOSORBIDE MONONITRATE 45 MG: 30 TABLET, EXTENDED RELEASE ORAL at 10:13

## 2021-01-01 RX ADMIN — FUROSEMIDE 20 MG: 10 INJECTION, SOLUTION INTRAVENOUS at 18:06

## 2021-01-01 RX ADMIN — ROSUVASTATIN CALCIUM 5 MG: 5 TABLET, FILM COATED ORAL at 21:49

## 2021-01-01 RX ADMIN — ISOSORBIDE MONONITRATE 45 MG: 30 TABLET, EXTENDED RELEASE ORAL at 10:44

## 2021-01-01 RX ADMIN — CEFTRIAXONE SODIUM 1 G: 1 INJECTION, POWDER, FOR SOLUTION INTRAMUSCULAR; INTRAVENOUS at 03:48

## 2021-01-01 RX ADMIN — PANTOPRAZOLE SODIUM 40 MG: 40 TABLET, DELAYED RELEASE ORAL at 09:10

## 2021-01-01 RX ADMIN — ESCITALOPRAM 5 MG: 5 TABLET, FILM COATED ORAL at 08:19

## 2021-01-01 RX ADMIN — LOSARTAN POTASSIUM 100 MG: 100 TABLET, FILM COATED ORAL at 09:10

## 2021-01-01 RX ADMIN — CLOPIDOGREL BISULFATE 75 MG: 75 TABLET ORAL at 10:13

## 2021-01-01 RX ADMIN — NITROGLYCERIN 70 MCG/MIN: 20 INJECTION INTRAVENOUS at 02:55

## 2021-01-01 RX ADMIN — ZOLPIDEM TARTRATE 5 MG: 5 TABLET ORAL at 21:49

## 2021-01-01 RX ADMIN — ESCITALOPRAM 5 MG: 5 TABLET, FILM COATED ORAL at 09:10

## 2021-01-01 RX ADMIN — Medication 25 MCG: at 10:46

## 2021-01-01 RX ADMIN — CEFTRIAXONE SODIUM 1 G: 1 INJECTION, POWDER, FOR SOLUTION INTRAMUSCULAR; INTRAVENOUS at 04:21

## 2021-01-01 RX ADMIN — METOPROLOL TARTRATE 25 MG: 25 TABLET, FILM COATED ORAL at 10:13

## 2021-01-01 RX ADMIN — UMECLIDINIUM 1 PUFF: 62.5 AEROSOL, POWDER ORAL at 09:15

## 2021-01-01 ASSESSMENT — ACTIVITIES OF DAILY LIVING (ADL)
ADLS_ACUITY_SCORE: 17
ADLS_ACUITY_SCORE: 19
ADLS_ACUITY_SCORE: 16
ADLS_ACUITY_SCORE: 15
ADLS_ACUITY_SCORE: 16
ADLS_ACUITY_SCORE: 16
ADLS_ACUITY_SCORE: 17
ADLS_ACUITY_SCORE: 16
ADLS_ACUITY_SCORE: 16
ADLS_ACUITY_SCORE: 17
ADLS_ACUITY_SCORE: 16
ADLS_ACUITY_SCORE: 19
ADLS_ACUITY_SCORE: 19
ADLS_ACUITY_SCORE: 16
ADLS_ACUITY_SCORE: 16
ADLS_ACUITY_SCORE: 19
ADLS_ACUITY_SCORE: 16
ADLS_ACUITY_SCORE: 8
ADLS_ACUITY_SCORE: 19
ADLS_ACUITY_SCORE: 17
ADLS_ACUITY_SCORE: 8
ADLS_ACUITY_SCORE: 15
ADLS_ACUITY_SCORE: 16
ADLS_ACUITY_SCORE: 15
ADLS_ACUITY_SCORE: 19
ADLS_ACUITY_SCORE: 16
ADLS_ACUITY_SCORE: 16
ADLS_ACUITY_SCORE: 17
ADLS_ACUITY_SCORE: 19
ADLS_ACUITY_SCORE: 16
ADLS_ACUITY_SCORE: 16
ADLS_ACUITY_SCORE: 17
ADLS_ACUITY_SCORE: 15
ADLS_ACUITY_SCORE: 16
ADLS_ACUITY_SCORE: 15
ADLS_ACUITY_SCORE: 19
ADLS_ACUITY_SCORE: 16
ADLS_ACUITY_SCORE: 19
ADLS_ACUITY_SCORE: 17
ADLS_ACUITY_SCORE: 16
ADLS_ACUITY_SCORE: 15
ADLS_ACUITY_SCORE: 19
ADLS_ACUITY_SCORE: 16
ADLS_ACUITY_SCORE: 19
ADLS_ACUITY_SCORE: 16
ADLS_ACUITY_SCORE: 16
ADLS_ACUITY_SCORE: 8
ADLS_ACUITY_SCORE: 16
ADLS_ACUITY_SCORE: 19
ADLS_ACUITY_SCORE: 16
ADLS_ACUITY_SCORE: 8
ADLS_ACUITY_SCORE: 19
ADLS_ACUITY_SCORE: 19
ADLS_ACUITY_SCORE: 16
ADLS_ACUITY_SCORE: 17
ADLS_ACUITY_SCORE: 16
ADLS_ACUITY_SCORE: 16
ADLS_ACUITY_SCORE: 17
ADLS_ACUITY_SCORE: 19
ADLS_ACUITY_SCORE: 16
ADLS_ACUITY_SCORE: 19
ADLS_ACUITY_SCORE: 16
ADLS_ACUITY_SCORE: 16
ADLS_ACUITY_SCORE: 17
ADLS_ACUITY_SCORE: 19
ADLS_ACUITY_SCORE: 15
ADLS_ACUITY_SCORE: 19
ADLS_ACUITY_SCORE: 16
ADLS_ACUITY_SCORE: 12
ADLS_ACUITY_SCORE: 15
ADLS_ACUITY_SCORE: 17
ADLS_ACUITY_SCORE: 19
ADLS_ACUITY_SCORE: 15
ADLS_ACUITY_SCORE: 16
ADLS_ACUITY_SCORE: 19
ADLS_ACUITY_SCORE: 16
ADLS_ACUITY_SCORE: 15
ADLS_ACUITY_SCORE: 16
ADLS_ACUITY_SCORE: 17
ADLS_ACUITY_SCORE: 16

## 2021-01-01 ASSESSMENT — MIFFLIN-ST. JEOR
SCORE: 1047.74
SCORE: 1055.38
SCORE: 1054.55
SCORE: 999.67
SCORE: 1004.21
SCORE: 972.46
SCORE: 981.08
SCORE: 1012.83
SCORE: 1047.74
SCORE: 1001.49
SCORE: 1030.52
SCORE: 1014.63
SCORE: 1032.78
SCORE: 1013.28
SCORE: 955.22

## 2021-01-01 ASSESSMENT — ENCOUNTER SYMPTOMS
FEVER: 0
WHEEZING: 1
ABDOMINAL PAIN: 0
VOMITING: 1
SHORTNESS OF BREATH: 1
TREMORS: 1

## 2021-01-04 ENCOUNTER — NURSING HOME VISIT (OUTPATIENT)
Dept: GERIATRICS | Facility: CLINIC | Age: 86
End: 2021-01-04
Payer: MEDICARE

## 2021-01-04 VITALS
HEIGHT: 61 IN | WEIGHT: 139.9 LBS | RESPIRATION RATE: 18 BRPM | OXYGEN SATURATION: 96 % | SYSTOLIC BLOOD PRESSURE: 147 MMHG | BODY MASS INDEX: 26.41 KG/M2 | DIASTOLIC BLOOD PRESSURE: 73 MMHG | TEMPERATURE: 98.5 F | HEART RATE: 72 BPM

## 2021-01-04 DIAGNOSIS — I70.0 ATHEROSCLEROSIS OF AORTA (H): ICD-10-CM

## 2021-01-04 DIAGNOSIS — I48.20 CHRONIC ATRIAL FIBRILLATION (H): ICD-10-CM

## 2021-01-04 DIAGNOSIS — F32.9 MAJOR DEPRESSIVE DISORDER, REMISSION STATUS UNSPECIFIED, UNSPECIFIED WHETHER RECURRENT: ICD-10-CM

## 2021-01-04 DIAGNOSIS — I50.32 CHRONIC DIASTOLIC CONGESTIVE HEART FAILURE (H): ICD-10-CM

## 2021-01-04 DIAGNOSIS — F51.01 PRIMARY INSOMNIA: Primary | ICD-10-CM

## 2021-01-04 PROCEDURE — 99309 SBSQ NF CARE MODERATE MDM 30: CPT | Performed by: NURSE PRACTITIONER

## 2021-01-04 ASSESSMENT — MIFFLIN-ST. JEOR: SCORE: 991.96

## 2021-01-04 NOTE — LETTER
1/4/2021        RE: Ermelinda Pfeiffer  [address]  [address]  [place] [address]        [facility]  [place] Medical Record Number: [id number]  Place of Service where encounter took place: [facility] ([facility]) [[id number]]  Chief Complaint   Patient presents with     Nursing Home Acute       HPI:    Ermelinda Pfeiffer is a 89 year old (10/19/1930), who is being seen today for an episodic care visit. HPI information obtained from: facility chart records, facility staff and patient report. Today's concern is:    Resident has been reluctant to stop Ambien. Risks of use have been reviewed and she verbalizes an understanding.Continuation of PRN order for non-antipsychotic psychotropic medication Ambien, is appropriate due to occasional insomnia and is being reordered today with a discussion of end date of 4/4/21.  Nsg to update [facility] provider of frequency of use 7 days prior to end date. She has prn melatonin but not used.     Past Medical and Surgical History reviewed in Epic today.    MEDICATIONS:    Current Outpatient Medications   Medication Sig Dispense Refill     albuterol (PROAIR HFA/PROVENTIL HFA/VENTOLIN HFA) 108 (90 Base) MCG/ACT inhaler Inhale 2 puffs into the lungs every 4 hours as needed for shortness of breath / dyspnea or wheezing       clopidogrel (PLAVIX) 75 MG tablet Take 75 mg by mouth daily       escitalopram (LEXAPRO) 5 MG tablet Take 5 mg by mouth daily       ferrous fumarate 65 mg, [place]. FE,-Vitamin C 125 mg (VITRON C)  MG TABS tablet Take 1 tablet by mouth daily       fluticasone-salmeterol (ADVAIR) 500-50 MCG/DOSE inhaler Inhale 1 puff into the lungs every 12 hours for Asthma Rinse and spit after each use.       hydrochlorothiazide (HYDRODIURIL) 25 MG tablet Take 25 mg by mouth daily every other day for blood pressure and fluid managment       isosorbide mononitrate (IMDUR) 30 MG 24 hr tablet Take 30 mg by mouth daily       lidocaine (LIDODERM) 5 %  "patch Place 1 patch onto the skin daily as needed for moderate pain To prevent lidocaine toxicity, patient should be patch free for 12 hrs daily.       losartan (COZAAR) 50 MG tablet Take 1.5 tablets (75 mg) by mouth daily       melatonin 3 MG tablet Take 6 mg by mouth nightly as needed        metoprolol tartrate (LOPRESSOR) 25 MG tablet Take 12.5 mg by mouth 2 times daily       nitroGLYcerin (NITROSTAT) 0.4 MG sublingual tablet Place 0.4 mg under the tongue every 5 minutes as needed for chest pain For chest pain place 1 tablet under the tongue every 5 minutes for 3 doses. If symptoms persist 5 minutes after 1st dose call 911.       pantoprazole (PROTONIX) 40 MG EC tablet Take 40 mg by mouth daily for Esophagitis Before breakfast       rosuvastatin (CRESTOR) 5 MG tablet Take 5 mg by mouth every other day At Bedtime       SENNA-docusate sodium (SENNA S) 8.6-50 MG tablet Take 1 tablet by mouth 2 times daily as needed       Vitamin D, Cholecalciferol, 25 MCG (1000 UT) TABS Take 1 tablet by mouth daily       zolpidem (AMBIEN) 5 MG tablet Take 2.5 mg by mouth nightly as needed for sleep       REVIEW OF SYSTEMS:  4 point ROS including Respiratory, CV, GI and , other than that noted in the HPI,  is negative    Objective:  BP (!) 147/73   Pulse 72   Temp 98.5  F (36.9  C)   Resp 18   Ht 1.549 m (5' 1\")   Wt 63.5 kg (139 lb 14.4 oz)   SpO2 96%   BMI 26.43 kg/m    Exam:  GENERAL APPEARANCE:  Alert, in no distress   ENT:  Mouth and posterior oropharynx normal, dry mucous membranes  EYES:  EOM, conjunctivae, right eye esotropia  RESP:  respiratory effort and palpation of chest normal, lungs clear  CV:  Palpation and auscultation of heart done , regular rate and rhythm, no murmur, rub, or gallop  ABDOMEN:  no guarding or rebound  M/S:   kyphotic stance, some generalized weakness above baseline, right ankle slight swelling  SKIN:  intact to visualized areas, band-aid over toenail   NEURO:   Cranial nerves 2-12 are normal " tested and grossly at patient's baseline  PSYCH:  normal insight, judgement and memory     Labs:   CBC RESULTS:   Recent Labs   Lab Test 08/05/20   WBC 7.7   RBC 3.87   HGB 11.2*   HCT 37.6   MCV 97   MCH 28.9   MCHC 29.8*   RDW 13.4          Last Basic Metabolic Panel:  Recent Labs   Lab Test 08/05/20      POTASSIUM 3.8   CHLORIDE 104   NELSON 8.7   CO2 28   BUN 18   CR 0.78   GLC 89       Liver Function Studies - No results for input(s): PROTTOTAL, ALBUMIN, BILITOTAL, ALKPHOS, AST, ALT, BILIDIRECT in the last 55096 hours.    No results found for: TSH]    No results found for: A1C    ASSESSMENT/PLAN:  (F51.01) Primary insomnia  (primary encounter diagnosis)  Comment: refuses to discontinue use of Ambien   Plan:   -continue low dose for now and revisit at next exam   -melatonin 6 mg po daily at HS prn     (I50.32) Chronic diastolic congestive heart failure (H)  (I48.20) Chronic atrial fibrillation (H)  (I70.0) Atherosclerosis of aorta (H)  Comment: stable  Plan:   -continue current plan of care    (F32.9) Major depressive disorder, remission status unspecified, unspecified whether recurrent  Comment: in good spirits today  Plan:   -enjoys JOYCELYN with family   -ESCITALOPRAM 5 mg po daily       Electronically signed by:  AZAEL Martínez CNP               Sincerely,        AZAEL Martínez CNP

## 2021-01-04 NOTE — PROGRESS NOTES
[facility]  [facility] Record Number: [id number]  Place of Service where encounter took place: [facility] ([facility]) [[id number]]  Chief Complaint   Patient presents with     Nursing Home Acute       HPI:    Ermelinda Pfeiffer is a 89 year old (10/19/1930), who is being seen today for an episodic care visit. HPI information obtained from: facility chart records, facility staff and patient report. Today's concern is:    Resident has been reluctant to stop Ambien. Risks of use have been reviewed and she verbalizes an understanding.Continuation of PRN order for non-antipsychotic psychotropic medication Ambien, is appropriate due to occasional insomnia and is being reordered today with a discussion of end date of 4/4/21.  Nsg to update [facility] provider of frequency of use 7 days prior to end date. She has prn melatonin but not used.     Past Medical and Surgical History reviewed in Epic today.    MEDICATIONS:    Current Outpatient Medications   Medication Sig Dispense Refill     albuterol (PROAIR HFA/PROVENTIL HFA/VENTOLIN HFA) 108 (90 Base) MCG/ACT inhaler Inhale 2 puffs into the lungs every 4 hours as needed for shortness of breath / dyspnea or wheezing       clopidogrel (PLAVIX) 75 MG tablet Take 75 mg by mouth daily       escitalopram (LEXAPRO) 5 MG tablet Take 5 mg by mouth daily       ferrous fumarate 65 mg, [place]. FE,-Vitamin C 125 mg (VITRON C)  MG TABS tablet Take 1 tablet by mouth daily       fluticasone-salmeterol (ADVAIR) 500-50 MCG/DOSE inhaler Inhale 1 puff into the lungs every 12 hours for Asthma Rinse and spit after each use.       hydrochlorothiazide (HYDRODIURIL) 25 MG tablet Take 25 mg by mouth daily every other day for blood pressure and fluid managment       isosorbide mononitrate (IMDUR) 30 MG 24 hr tablet Take 30 mg by mouth daily       lidocaine (LIDODERM) 5 % patch Place 1 patch onto the skin daily as needed for moderate pain To prevent lidocaine toxicity, patient  "should be patch free for 12 hrs daily.       losartan (COZAAR) 50 MG tablet Take 1.5 tablets (75 mg) by mouth daily       melatonin 3 MG tablet Take 6 mg by mouth nightly as needed        metoprolol tartrate (LOPRESSOR) 25 MG tablet Take 12.5 mg by mouth 2 times daily       nitroGLYcerin (NITROSTAT) 0.4 MG sublingual tablet Place 0.4 mg under the tongue every 5 minutes as needed for chest pain For chest pain place 1 tablet under the tongue every 5 minutes for 3 doses. If symptoms persist 5 minutes after 1st dose call 911.       pantoprazole (PROTONIX) 40 MG EC tablet Take 40 mg by mouth daily for Esophagitis Before breakfast       rosuvastatin (CRESTOR) 5 MG tablet Take 5 mg by mouth every other day At Bedtime       SENNA-docusate sodium (SENNA S) 8.6-50 MG tablet Take 1 tablet by mouth 2 times daily as needed       Vitamin D, Cholecalciferol, 25 MCG (1000 UT) TABS Take 1 tablet by mouth daily       zolpidem (AMBIEN) 5 MG tablet Take 2.5 mg by mouth nightly as needed for sleep       REVIEW OF SYSTEMS:  4 point ROS including Respiratory, CV, GI and , other than that noted in the HPI,  is negative    Objective:  BP (!) 147/73   Pulse 72   Temp 98.5  F (36.9  C)   Resp 18   Ht 1.549 m (5' 1\")   Wt 63.5 kg (139 lb 14.4 oz)   SpO2 96%   BMI 26.43 kg/m    Exam:  GENERAL APPEARANCE:  Alert, in no distress   ENT:  Mouth and posterior oropharynx normal, dry mucous membranes  EYES:  EOM, conjunctivae, right eye esotropia  RESP:  respiratory effort and palpation of chest normal, lungs clear  CV:  Palpation and auscultation of heart done , regular rate and rhythm, no murmur, rub, or gallop  ABDOMEN:  no guarding or rebound  M/S:   kyphotic stance, some generalized weakness above baseline, right ankle slight swelling  SKIN:  intact to visualized areas, band-aid over toenail   NEURO:   Cranial nerves 2-12 are normal tested and grossly at patient's baseline  PSYCH:  normal insight, judgement and memory     Labs:   CBC " RESULTS:   Recent Labs   Lab Test 08/05/20   WBC 7.7   RBC 3.87   HGB 11.2*   HCT 37.6   MCV 97   MCH 28.9   MCHC 29.8*   RDW 13.4          Last Basic Metabolic Panel:  Recent Labs   Lab Test 08/05/20      POTASSIUM 3.8   CHLORIDE 104   NELSON 8.7   CO2 28   BUN 18   CR 0.78   GLC 89       Liver Function Studies - No results for input(s): PROTTOTAL, ALBUMIN, BILITOTAL, ALKPHOS, AST, ALT, BILIDIRECT in the last 85907 hours.    No results found for: TSH]    No results found for: A1C    ASSESSMENT/PLAN:  (F51.01) Primary insomnia  (primary encounter diagnosis)  Comment: refuses to discontinue use of Ambien   Plan:   -continue low dose for now and revisit at next exam   -melatonin 6 mg po daily at HS prn     (I50.32) Chronic diastolic congestive heart failure (H)  (I48.20) Chronic atrial fibrillation (H)  (I70.0) Atherosclerosis of aorta (H)  Comment: stable  Plan:   -continue current plan of care    (F32.9) Major depressive disorder, remission status unspecified, unspecified whether recurrent  Comment: in good spirits today  Plan:   -enjoys JOYCELYN with family   -ESCITALOPRAM 5 mg po daily       Electronically signed by:  AZAEL Martínez CNP

## 2021-01-07 ENCOUNTER — RECORDS - HEALTHEAST (OUTPATIENT)
Dept: LAB | Facility: CLINIC | Age: 86
End: 2021-01-07

## 2021-01-07 LAB
ALBUMIN UR-MCNC: NEGATIVE MG/DL
APPEARANCE UR: CLEAR
BILIRUB UR QL STRIP: NEGATIVE
COLOR UR AUTO: YELLOW
GLUCOSE UR STRIP-MCNC: NEGATIVE MG/DL
HGB UR QL STRIP: NEGATIVE
KETONES UR STRIP-MCNC: NEGATIVE MG/DL
LEUKOCYTE ESTERASE UR QL STRIP: NEGATIVE
NITRATE UR QL: NEGATIVE
PH UR STRIP: 5.5 [PH] (ref 4.5–8)
SP GR UR STRIP: 1.01 (ref 1–1.03)
UROBILINOGEN UR STRIP-ACNC: NORMAL

## 2021-01-08 LAB
ANION GAP SERPL CALCULATED.3IONS-SCNC: 9 MMOL/L (ref 5–18)
BASOPHILS # BLD AUTO: 0 THOU/UL (ref 0–0.2)
BASOPHILS NFR BLD AUTO: 0 % (ref 0–2)
BUN SERPL-MCNC: 23 MG/DL (ref 8–28)
CALCIUM SERPL-MCNC: 8.9 MG/DL (ref 8.5–10.5)
CHLORIDE BLD-SCNC: 104 MMOL/L (ref 98–107)
CO2 SERPL-SCNC: 28 MMOL/L (ref 22–31)
CREAT SERPL-MCNC: 0.77 MG/DL (ref 0.6–1.1)
EOSINOPHIL # BLD AUTO: 0.3 THOU/UL (ref 0–0.4)
EOSINOPHIL NFR BLD AUTO: 5 % (ref 0–6)
ERYTHROCYTE [DISTWIDTH] IN BLOOD BY AUTOMATED COUNT: 12.8 % (ref 11–14.5)
GFR SERPL CREATININE-BSD FRML MDRD: >60 ML/MIN/1.73M2
GLUCOSE BLD-MCNC: 76 MG/DL (ref 70–125)
HCT VFR BLD AUTO: 35 % (ref 35–47)
HGB BLD-MCNC: 11 G/DL (ref 12–16)
IMM GRANULOCYTES # BLD: 0 THOU/UL
IMM GRANULOCYTES NFR BLD: 1 %
LYMPHOCYTES # BLD AUTO: 0.9 THOU/UL (ref 0.8–4.4)
LYMPHOCYTES NFR BLD AUTO: 12 % (ref 20–40)
MCH RBC QN AUTO: 30.6 PG (ref 27–34)
MCHC RBC AUTO-ENTMCNC: 31.4 G/DL (ref 32–36)
MCV RBC AUTO: 98 FL (ref 80–100)
MONOCYTES # BLD AUTO: 0.7 THOU/UL (ref 0–0.9)
MONOCYTES NFR BLD AUTO: 9 % (ref 2–10)
NEUTROPHILS # BLD AUTO: 5.5 THOU/UL (ref 2–7.7)
NEUTROPHILS NFR BLD AUTO: 74 % (ref 50–70)
PLATELET # BLD AUTO: 252 THOU/UL (ref 140–440)
PMV BLD AUTO: 11.5 FL (ref 8.5–12.5)
POTASSIUM BLD-SCNC: 3.7 MMOL/L (ref 3.5–5)
RBC # BLD AUTO: 3.59 MILL/UL (ref 3.8–5.4)
SODIUM SERPL-SCNC: 141 MMOL/L (ref 136–145)
WBC: 7.4 THOU/UL (ref 4–11)

## 2021-01-11 ENCOUNTER — NURSING HOME VISIT (OUTPATIENT)
Dept: GERIATRICS | Facility: CLINIC | Age: 86
End: 2021-01-11
Payer: MEDICARE

## 2021-01-11 VITALS
HEART RATE: 76 BPM | TEMPERATURE: 97.5 F | RESPIRATION RATE: 18 BRPM | DIASTOLIC BLOOD PRESSURE: 79 MMHG | OXYGEN SATURATION: 97 % | BODY MASS INDEX: 25.6 KG/M2 | WEIGHT: 135.6 LBS | SYSTOLIC BLOOD PRESSURE: 154 MMHG | HEIGHT: 61 IN

## 2021-01-11 DIAGNOSIS — R29.6 FALLS FREQUENTLY: ICD-10-CM

## 2021-01-11 DIAGNOSIS — N39.41 URGE INCONTINENCE OF URINE: ICD-10-CM

## 2021-01-11 DIAGNOSIS — R39.15 URINARY URGENCY: ICD-10-CM

## 2021-01-11 DIAGNOSIS — N39.0 RECURRENT URINARY TRACT INFECTION: Primary | ICD-10-CM

## 2021-01-11 PROCEDURE — 99309 SBSQ NF CARE MODERATE MDM 30: CPT | Performed by: NURSE PRACTITIONER

## 2021-01-11 ASSESSMENT — MIFFLIN-ST. JEOR: SCORE: 972.46

## 2021-01-11 NOTE — LETTER
"    1/11/2021        RE: Ermelinda Pfeiffer  [address]  [address]  [place] [address]        [facility]  [facility] Record Number: [id number]  Place of Service where encounter took place: [facility] ([facility]) [[id number]]  Chief Complaint   Patient presents with     Nursing Home Acute       HPI:    Ermelinda Pfeiffer is a 89 year old (10/19/1930), who is being seen today for an episodic care visit. HPI information obtained from: facility chart records, patient report, [facility] chart review and family/first contact daughter report. Today's concern is:    History of frequent UTIs and becomes \"very sick\" according to her daughter with any urinary infection. Last treated for infection 12/2020. She was again with frequency, urgency and discomfort with urination last week and UA negative for infection. Encouraged to increase oral intake of fluids throughout the day. Reviewed chronic antibiotic for UTI prophylactics although listed with \"hive\" allergy to TMP/Sulfa and this would be medication of choice for indication. Also discussed use of estrogen topical cream.    Reviewed also with OT today for eval  Of lightweight WC. Would be able to move/ambulate around the facility easier. She is a fall risk with 2WW use secondary to vision field cuts, unsteadiness.     Past Medical and Surgical History reviewed in Epic today.    MEDICATIONS:    Current Outpatient Medications   Medication Sig Dispense Refill     albuterol (PROAIR HFA/PROVENTIL HFA/VENTOLIN HFA) 108 (90 Base) MCG/ACT inhaler Inhale 2 puffs into the lungs every 4 hours as needed for shortness of breath / dyspnea or wheezing       clopidogrel (PLAVIX) 75 MG tablet Take 75 mg by mouth daily       escitalopram (LEXAPRO) 5 MG tablet Take 5 mg by mouth daily       ferrous fumarate 65 mg, [place]. FE,-Vitamin C 125 mg (VITRON C)  MG TABS tablet Take 1 tablet by mouth daily       fluticasone-salmeterol (ADVAIR) 500-50 " "MCG/DOSE inhaler Inhale 1 puff into the lungs every 12 hours for Asthma Rinse and spit after each use.       hydrochlorothiazide (HYDRODIURIL) 25 MG tablet Take 25 mg by mouth daily every other day for blood pressure and fluid managment       isosorbide mononitrate (IMDUR) 30 MG 24 hr tablet Take 30 mg by mouth daily       lidocaine (LIDODERM) 5 % patch Place 1 patch onto the skin daily as needed for moderate pain To prevent lidocaine toxicity, patient should be patch free for 12 hrs daily.       losartan (COZAAR) 50 MG tablet Take 1.5 tablets (75 mg) by mouth daily       melatonin 3 MG tablet Take 6 mg by mouth nightly as needed        metoprolol tartrate (LOPRESSOR) 25 MG tablet Take 12.5 mg by mouth 2 times daily       nitroGLYcerin (NITROSTAT) 0.4 MG sublingual tablet Place 0.4 mg under the tongue every 5 minutes as needed for chest pain For chest pain place 1 tablet under the tongue every 5 minutes for 3 doses. If symptoms persist 5 minutes after 1st dose call 911.       pantoprazole (PROTONIX) 40 MG EC tablet Take 40 mg by mouth daily for Esophagitis Before breakfast       rosuvastatin (CRESTOR) 5 MG tablet Take 5 mg by mouth every other day At Bedtime       SENNA-docusate sodium (SENNA S) 8.6-50 MG tablet Take 1 tablet by mouth 2 times daily as needed       Vitamin D, Cholecalciferol, 25 MCG (1000 UT) TABS Take 1 tablet by mouth daily       zolpidem (AMBIEN) 5 MG tablet Take 2.5 mg by mouth nightly as needed for sleep       REVIEW OF SYSTEMS:  4 point ROS including Respiratory, CV, GI and , other than that noted in the HPI,  is negative    Objective:  BP (!) 154/79   Pulse 76   Temp 97.5  F (36.4  C)   Resp 18   Ht 1.549 m (5' 1\")   Wt 61.5 kg (135 lb 9.6 oz)   SpO2 97%   BMI 25.62 kg/m    Exam:  GENERAL APPEARANCE:  Alert, in no distress   ENT:  Mouth and posterior oropharynx normal, dry mucous membranes  EYES:  EOM, conjunctivae, right eye esotropia  RESP:  respiratory effort and palpation of chest " normal, lungs clear  CV:  Palpation and auscultation of heart done , regular rate and rhythm, no murmur, rub, or gallop, trace edema  ABDOMEN:  no guarding or rebound  M/S:   kyphotic stance, some generalized weakness above baseline, right ankle slight swelling  SKIN:  intact to visualized areas  NEURO:   Cranial nerves 2-12 are normal tested and grossly at patient's baseline  PSYCH:  normal insight, judgement and memory    Labs:   CBC RESULTS:   Recent Labs   Lab Test 08/05/20   WBC 7.7   RBC 3.87   HGB 11.2*   HCT 37.6   MCV 97   MCH 28.9   MCHC 29.8*   RDW 13.4          Last Basic Metabolic Panel:  Recent Labs   Lab Test 08/05/20      POTASSIUM 3.8   CHLORIDE 104   NELSON 8.7   CO2 28   BUN 18   CR 0.78   GLC 89       Liver Function Studies - No results for input(s): PROTTOTAL, ALBUMIN, BILITOTAL, ALKPHOS, AST, ALT, BILIDIRECT in the last 95030 hours.    No results found for: TSH]    No results found for: A1C    ASSESSMENT/PLAN:  (N39.0) Recurrent urinary tract infection  (primary encounter diagnosis)  (R39.15) Urinary urgency  (N39.41) Urge incontinence of urine  Comment: family considering chronic antibiotic use pro's v con's  Plan:    -consider nitrofurantoin 100 mg po daily for prophylaxis  -consider estradiol cream daily or vaginal supp  -encourage daily oral intake of water/fluids for urinary health  -sees uro/gyn for pessary ring use every 3 months   -f/u phone call to her daughter to review options     (R29.6) Falls frequently  Comment: at risk for falls  Plan:   -OT to eval for lightweight WC at upcoming clinic           Electronically signed by:  AZAEL Martínez CNP               Sincerely,        AZAEL Martínez CNP

## 2021-01-11 NOTE — PROGRESS NOTES
"[facility]  [facility] Record Number: [id number]  Place of Service where encounter took place: [facility] ([facility]) [[id number]]  Chief Complaint   Patient presents with     Nursing Home Acute       HPI:    Ermelinda Pfeiffer is a 89 year old (10/19/1930), who is being seen today for an episodic care visit. HPI information obtained from: facility chart records, patient report, [facility] chart review and family/first contact daughter report. Today's concern is:    History of frequent UTIs and becomes \"very sick\" according to her daughter with any urinary infection. Last treated for infection 12/2020. She was again with frequency, urgency and discomfort with urination last week and UA negative for infection. Encouraged to increase oral intake of fluids throughout the day. Reviewed chronic antibiotic for UTI prophylactics although listed with \"hive\" allergy to TMP/Sulfa and this would be medication of choice for indication. Also discussed use of estrogen topical cream.    Reviewed also with OT today for eval  Of lightweight WC. Would be able to move/ambulate around the facility easier. She is a fall risk with 2WW use secondary to vision field cuts, unsteadiness.     Past Medical and Surgical History reviewed in Epic today.    MEDICATIONS:    Current Outpatient Medications   Medication Sig Dispense Refill     albuterol (PROAIR HFA/PROVENTIL HFA/VENTOLIN HFA) 108 (90 Base) MCG/ACT inhaler Inhale 2 puffs into the lungs every 4 hours as needed for shortness of breath / dyspnea or wheezing       clopidogrel (PLAVIX) 75 MG tablet Take 75 mg by mouth daily       escitalopram (LEXAPRO) 5 MG tablet Take 5 mg by mouth daily       ferrous fumarate 65 mg, [place]. FE,-Vitamin C 125 mg (VITRON C)  MG TABS tablet Take 1 tablet by mouth daily       fluticasone-salmeterol (ADVAIR) 500-50 MCG/DOSE inhaler Inhale 1 puff into the lungs every 12 hours for Asthma Rinse and spit after each use.       " "hydrochlorothiazide (HYDRODIURIL) 25 MG tablet Take 25 mg by mouth daily every other day for blood pressure and fluid managment       isosorbide mononitrate (IMDUR) 30 MG 24 hr tablet Take 30 mg by mouth daily       lidocaine (LIDODERM) 5 % patch Place 1 patch onto the skin daily as needed for moderate pain To prevent lidocaine toxicity, patient should be patch free for 12 hrs daily.       losartan (COZAAR) 50 MG tablet Take 1.5 tablets (75 mg) by mouth daily       melatonin 3 MG tablet Take 6 mg by mouth nightly as needed        metoprolol tartrate (LOPRESSOR) 25 MG tablet Take 12.5 mg by mouth 2 times daily       nitroGLYcerin (NITROSTAT) 0.4 MG sublingual tablet Place 0.4 mg under the tongue every 5 minutes as needed for chest pain For chest pain place 1 tablet under the tongue every 5 minutes for 3 doses. If symptoms persist 5 minutes after 1st dose call 911.       pantoprazole (PROTONIX) 40 MG EC tablet Take 40 mg by mouth daily for Esophagitis Before breakfast       rosuvastatin (CRESTOR) 5 MG tablet Take 5 mg by mouth every other day At Bedtime       SENNA-docusate sodium (SENNA S) 8.6-50 MG tablet Take 1 tablet by mouth 2 times daily as needed       Vitamin D, Cholecalciferol, 25 MCG (1000 UT) TABS Take 1 tablet by mouth daily       zolpidem (AMBIEN) 5 MG tablet Take 2.5 mg by mouth nightly as needed for sleep       REVIEW OF SYSTEMS:  4 point ROS including Respiratory, CV, GI and , other than that noted in the HPI,  is negative    Objective:  BP (!) 154/79   Pulse 76   Temp 97.5  F (36.4  C)   Resp 18   Ht 1.549 m (5' 1\")   Wt 61.5 kg (135 lb 9.6 oz)   SpO2 97%   BMI 25.62 kg/m    Exam:  GENERAL APPEARANCE:  Alert, in no distress   ENT:  Mouth and posterior oropharynx normal, dry mucous membranes  EYES:  EOM, conjunctivae, right eye esotropia  RESP:  respiratory effort and palpation of chest normal, lungs clear  CV:  Palpation and auscultation of heart done , regular rate and rhythm, no murmur, " rub, or gallop, trace edema  ABDOMEN:  no guarding or rebound  M/S:   kyphotic stance, some generalized weakness above baseline, right ankle slight swelling  SKIN:  intact to visualized areas  NEURO:   Cranial nerves 2-12 are normal tested and grossly at patient's baseline  PSYCH:  normal insight, judgement and memory    Labs:   CBC RESULTS:   Recent Labs   Lab Test 08/05/20   WBC 7.7   RBC 3.87   HGB 11.2*   HCT 37.6   MCV 97   MCH 28.9   MCHC 29.8*   RDW 13.4          Last Basic Metabolic Panel:  Recent Labs   Lab Test 08/05/20      POTASSIUM 3.8   CHLORIDE 104   NELSON 8.7   CO2 28   BUN 18   CR 0.78   GLC 89       Liver Function Studies - No results for input(s): PROTTOTAL, ALBUMIN, BILITOTAL, ALKPHOS, AST, ALT, BILIDIRECT in the last 44894 hours.    No results found for: TSH]    No results found for: A1C    ASSESSMENT/PLAN:  (N39.0) Recurrent urinary tract infection  (primary encounter diagnosis)  (R39.15) Urinary urgency  (N39.41) Urge incontinence of urine  Comment: family considering chronic antibiotic use pro's v con's  Plan:    -consider nitrofurantoin 100 mg po daily for prophylaxis  -consider estradiol cream daily or vaginal supp  -encourage daily oral intake of water/fluids for urinary health  -sees uro/gyn for pessary ring use every 3 months   -f/u phone call to her daughter to review options     (R29.6) Falls frequently  Comment: at risk for falls  Plan:   -OT to eval for lightweight WC at upcoming clinic           Electronically signed by:  AZAEL Martínez CNP

## 2021-02-01 ENCOUNTER — NURSING HOME VISIT (OUTPATIENT)
Dept: GERIATRICS | Facility: CLINIC | Age: 86
End: 2021-02-01
Payer: MEDICARE

## 2021-02-01 ENCOUNTER — RECORDS - HEALTHEAST (OUTPATIENT)
Dept: LAB | Facility: CLINIC | Age: 86
End: 2021-02-01

## 2021-02-01 VITALS
WEIGHT: 136 LBS | HEART RATE: 78 BPM | BODY MASS INDEX: 25.68 KG/M2 | OXYGEN SATURATION: 96 % | SYSTOLIC BLOOD PRESSURE: 126 MMHG | RESPIRATION RATE: 20 BRPM | DIASTOLIC BLOOD PRESSURE: 73 MMHG | HEIGHT: 61 IN | TEMPERATURE: 97.8 F

## 2021-02-01 DIAGNOSIS — R54 FRAILTY: ICD-10-CM

## 2021-02-01 DIAGNOSIS — I70.0 ATHEROSCLEROSIS OF AORTA (H): ICD-10-CM

## 2021-02-01 DIAGNOSIS — I48.20 CHRONIC ATRIAL FIBRILLATION (H): ICD-10-CM

## 2021-02-01 DIAGNOSIS — F51.01 PRIMARY INSOMNIA: ICD-10-CM

## 2021-02-01 DIAGNOSIS — M79.671 PAIN OF RIGHT HEEL: ICD-10-CM

## 2021-02-01 DIAGNOSIS — I50.32 CHRONIC DIASTOLIC CONGESTIVE HEART FAILURE (H): ICD-10-CM

## 2021-02-01 DIAGNOSIS — Z86.73 HISTORY OF CVA (CEREBROVASCULAR ACCIDENT): ICD-10-CM

## 2021-02-01 DIAGNOSIS — I11.9 HYPERTENSIVE HEART DISEASE WITHOUT HEART FAILURE: ICD-10-CM

## 2021-02-01 DIAGNOSIS — N39.0 RECURRENT URINARY TRACT INFECTION: Primary | ICD-10-CM

## 2021-02-01 DIAGNOSIS — R29.6 FALLS FREQUENTLY: ICD-10-CM

## 2021-02-01 PROCEDURE — 99318 PR ANNUAL NURSING FAC ASSESSMNT, STABLE: CPT | Performed by: NURSE PRACTITIONER

## 2021-02-01 RX ORDER — ACETAMINOPHEN 500 MG
1000 TABLET ORAL 3 TIMES DAILY PRN
COMMUNITY
Start: 2021-02-01 | End: 2021-01-01

## 2021-02-01 ASSESSMENT — MIFFLIN-ST. JEOR: SCORE: 974.27

## 2021-02-01 NOTE — LETTER
"    2/1/2021        RE: Ermelinda Pfeiffer  [address]  [address]  [place] [address]        [facility]  Chief Complaint   Patient presents with     Annual Comprehensive Nursing Home     [facility] Medical Record Number: [id number]  Place of Service where encounter took place: [facility] ([facility]) [[id number]]    HPI:    Ermelinda Pfeiffer is a 89 year old (10/19/1930), who is being seen today for an annual comprehensive visit. HPI information obtained from: facility chart records, patient report, [facility] chart review and family/first contact daughter report. Today's concerns are:    History of frequent UTIs and becomes \"very sick\" according to her daughter with any urinary infection. Last treated for infection 12/2020. She was again with frequency, urgency and discomfort with urination last week and UA negative for infection. Encouraged to increase oral intake of fluids throughout the day. Reviewed chronic antibiotic for UTI prophylactics although listed with \"hive\" allergy to TMP/Sulfa and this would be medication of choice for indication. Also discussed use of estrogen topical cream. Follows with urology and use of pessary.     Reviewed also with OT today for eval  Of lightweight WC. Would be able to move/ambulate around the facility easier. She is a fall risk with 2WW use secondary to vision field cuts, unsteadiness.     Chronic insomnia and has as needed Ambien. Has not wanted to part with this medication and uses most nights according to staff but in chart review only marked given 7 times last month. No use of prn Melatonin. Staff says she is \"unsteady\" in the morning and wondering if this is contributor.    Weight stable 134-139 lbs. BP average 146/74 HR 74. CAD, history of chronic diastolic CHF, appears compensated, on metprolol, losartan, hydrochlorothiazide, plavix, isosorbide. History of Afib, no clinical recurrence.    ALLERGIES: Sulfamethoxazole-trimethoprim, " Levofloxacin, Clarithromycin, Mirtazapine, and Penicillin g  PAST MEDICAL HISTORY:  has a past medical history of Acute sinus infection, Anemia, CAD (coronary artery disease), Cancer of the skin, basal cell, Cataract, Depressive disorder, Diplopia, Disturbance, sleep, GERD (gastroesophageal reflux disease), HTN (hypertension), Hyperlipidemia, Insomnia, Myocardial infarction (H), Osteoporosis, Skin cancer, Status post parathyroidectomy (05/23/2016), Stone, kidney, and Transient ischemic attack.  PAST SURGICAL HISTORY:  has a past surgical history that includes Parathyroidectomy (1969) and PERCUTANEOUS TRANSLUMINAL BALLOON ANGIOPLASTY WITH INSERTION OF STENT INTO CORONARY ARTERY N/A  (10/29/1997).  IMMUNIZATIONS:  Immunization History   Administered Date(s) Administered     COVID-19,PF,Moderna 01/08/2021     Influenza (High Dose) 3 valent vaccine 01/06/2017, 11/20/2018, 12/10/2018, 10/23/2019, 11/03/2020     Pneumo Conj 13-V (2010&after) 02/26/2020     Pneumococcal 23 valent 03/10/2000     TDAP Vaccine (Adacel) 10/30/2012     Td (Adult), Adsorbed 09/22/2000, 09/12/2002     Tdap (Adult) Unspecified Formulation 09/22/2000     Above immunizations pulled from [employer]. MIIC and facility records also reconciled. Outstanding information sent to  to update [facility].  Future immunizations are not needed at this point as all recommended immunizations are up to date.     Current Outpatient Medications   Medication Sig Dispense Refill     albuterol (PROAIR HFA/PROVENTIL HFA/VENTOLIN HFA) 108 (90 Base) MCG/ACT inhaler Inhale 2 puffs into the lungs every 4 hours as needed for shortness of breath / dyspnea or wheezing       clopidogrel (PLAVIX) 75 MG tablet Take 75 mg by mouth daily       escitalopram (LEXAPRO) 5 MG tablet Take 5 mg by mouth daily       ferrous fumarate 65 mg, [place]. FE,-Vitamin C 125 mg (VITRON C)  MG TABS tablet Take 1 tablet by mouth daily       fluticasone-salmeterol (ADVAIR)  500-50 MCG/DOSE inhaler Inhale 1 puff into the lungs every 12 hours for Asthma Rinse and spit after each use.       hydrochlorothiazide (HYDRODIURIL) 25 MG tablet Take 25 mg by mouth daily every other day for blood pressure and fluid managment       isosorbide mononitrate (IMDUR) 30 MG 24 hr tablet Take 30 mg by mouth daily       lidocaine (LIDODERM) 5 % patch Place 1 patch onto the skin daily as needed for moderate pain To prevent lidocaine toxicity, patient should be patch free for 12 hrs daily.       losartan (COZAAR) 50 MG tablet Take 1.5 tablets (75 mg) by mouth daily       melatonin 3 MG tablet Take 6 mg by mouth nightly as needed        metoprolol tartrate (LOPRESSOR) 25 MG tablet Take 12.5 mg by mouth 2 times daily       nitroGLYcerin (NITROSTAT) 0.4 MG sublingual tablet Place 0.4 mg under the tongue every 5 minutes as needed for chest pain For chest pain place 1 tablet under the tongue every 5 minutes for 3 doses. If symptoms persist 5 minutes after 1st dose call 911.       pantoprazole (PROTONIX) 40 MG EC tablet Take 40 mg by mouth daily for Esophagitis Before breakfast       rosuvastatin (CRESTOR) 5 MG tablet Take 5 mg by mouth every other day At Bedtime       SENNA-docusate sodium (SENNA S) 8.6-50 MG tablet Take 1 tablet by mouth 2 times daily as needed       Vitamin D, Cholecalciferol, 25 MCG (1000 UT) TABS Take 1 tablet by mouth daily       zolpidem (AMBIEN) 5 MG tablet Take 2.5 mg by mouth nightly as needed for sleep       Case Management:  I have reviewed the facility/SNF care plan/MDS, including the falls risk, nutrition and pain screening. I also reviewed the current immunizations, and preventive care. .Future cancer screening is not clinically indicated secondary to age/goals of care Patient's desire to return to the community is present, but is not able due to care needs . Current Level of Care is appropriate.    Advance Directive Discussion:    I reviewed the current advanced directives as  "reflected in EPIC, the POLST and the facility chart, and verified the congruency of orders. I contacted the first party daughter and discussed the plan of Care. I did review the advance directives with the resident.     Team Discussion:  I communicated with the appropriate disciplines involved with the Plan of Care: Nursing    Patient's goal is pain control and comfort. Treat all conditions with Full Code status   Information reviewed:  Medications, vital signs, orders, and nursing notes.    ROS:  4 point ROS including Respiratory, CV, GI and , other than that noted in the HPI,  is negative    Vitals:  /73   Pulse 78   Temp 97.8  F (36.6  C)   Resp 20   Ht 1.549 m (5' 1\")   Wt 61.7 kg (136 lb)   SpO2 96%   BMI 25.70 kg/m   Body mass index is 25.7 kg/m .  Exam:  GENERAL APPEARANCE:  Alert, in no distress   ENT:  Mouth and posterior oropharynx normal, dry mucous membranes  EYES:  EOM, conjunctivae, right eye esotropia  RESP:  respiratory effort and palpation of chest normal, lungs clear  CV:  Palpation and auscultation of heart done , regular rate and rhythm, no murmur, rub, or gallop, trace edema-no compression   ABDOMEN:  no guarding or rebound  M/S:   kyphotic stance, some generalized weakness   SKIN:  intact to visualized areas-lesion on forehead is raised, dry and flaky   NEURO:   Cranial nerves 2-12 are normal tested and grossly at patient's baseline  PSYCH:  normal insight, judgement and memory    Lab/Diagnostic data:   CBC RESULTS:   Recent Labs   Lab Test 08/05/20   WBC 7.7   RBC 3.87   HGB 11.2*   HCT 37.6   MCV 97   MCH 28.9   MCHC 29.8*   RDW 13.4          Last Basic Metabolic Panel:  Recent Labs   Lab Test 08/05/20      POTASSIUM 3.8   CHLORIDE 104   NELSON 8.7   CO2 28   BUN 18   CR 0.78   GLC 89       Liver Function Studies - No results for input(s): PROTTOTAL, ALBUMIN, BILITOTAL, ALKPHOS, AST, ALT, BILIDIRECT in the last 25415 hours.    No results found for: TSH]    No results " found for: A1C    ASSESSMENT/PLAN  (N39.0) Recurrent urinary tract infection  (primary encounter diagnosis)  Comment: history or  Plan:   -consider nitrofurantoin 100 mg po daily for prophylaxis  -consider estradiol cream daily or vaginal supp  -encourage daily oral intake of water/fluids for urinary health  -sees uro/gyn for pessary ring use every 3 months     (F51.01) Primary insomnia  Comment: use of as needed Ambien 8 times in January but thinks used it more often?  Plan:   --melatonin 6 mg at HS prn mostly does not use  --ambien 2.5 mg daily PRN     (R29.6) Falls frequently  (R54) Frailty  Comment: no recent falls  Plan:   -continue low dose as possible for sedative medications     (I50.32) Chronic diastolic congestive heart failure (H)  (I48.20) Chronic atrial fibrillation (H)  (I70.0) Atherosclerosis of aorta (H)  (Z86.73) History of CVA (cerebrovascular accident)  (I11.9) Hypertensive heart disease without heart failure  Comment: BP average 146/76 HR74  Plan:   --continue with plavix 75 mg daily  --hydrochlorothiazide 25 mg every other daily  --losartan 75 mg daily  --isosorbide 30 mg daily  --metoprolol 12.5 mg po BID  --Continue to monitor blood pressure and adjust medications as needed.  --BMP periodically     (M79.671) Pain of right heel  Comment: intermittent at night   Plan:  -Tylenol 1,000 mg po TID PRN       Electronically signed by:  AZAEL Martínez CNP         Sincerely,        AZAEL Martínez CNP

## 2021-02-01 NOTE — PROGRESS NOTES
"[facility]  Chief Complaint   Patient presents with     Annual Comprehensive Nursing Home     [facility] Record Number: [id number]  Place of Service where encounter took place: [facility] ([facility]) [[id number]]    HPI:    Ermelinda Pfeiffer is a 89 year old (10/19/1930), who is being seen today for an annual comprehensive visit. HPI information obtained from: facility chart records, patient report, [facility] chart review and family/first contact daughter report. Today's concerns are:    History of frequent UTIs and becomes \"very sick\" according to her daughter with any urinary infection. Last treated for infection 12/2020. She was again with frequency, urgency and discomfort with urination last week and UA negative for infection. Encouraged to increase oral intake of fluids throughout the day. Reviewed chronic antibiotic for UTI prophylactics although listed with \"hive\" allergy to TMP/Sulfa and this would be medication of choice for indication. Also discussed use of estrogen topical cream. Follows with urology and use of pessary.     Reviewed also with OT today for eval  Of lightweight WC. Would be able to move/ambulate around the facility easier. She is a fall risk with 2WW use secondary to vision field cuts, unsteadiness.     Chronic insomnia and has as needed Ambien. Has not wanted to part with this medication and uses most nights according to staff but in chart review only marked given 7 times last month. No use of prn Melatonin. Staff says she is \"unsteady\" in the morning and wondering if this is contributor.    Weight stable 134-139 lbs. BP average 146/74 HR 74. CAD, history of chronic diastolic CHF, appears compensated, on metprolol, losartan, hydrochlorothiazide, plavix, isosorbide. History of Afib, no clinical recurrence.    ALLERGIES: Sulfamethoxazole-trimethoprim, Levofloxacin, Clarithromycin, Mirtazapine, and Penicillin g  PAST MEDICAL HISTORY:  has a past medical " history of Acute sinus infection, Anemia, CAD (coronary artery disease), Cancer of the skin, basal cell, Cataract, Depressive disorder, Diplopia, Disturbance, sleep, GERD (gastroesophageal reflux disease), HTN (hypertension), Hyperlipidemia, Insomnia, Myocardial infarction (H), Osteoporosis, Skin cancer, Status post parathyroidectomy (05/23/2016), Stone, kidney, and Transient ischemic attack.  PAST SURGICAL HISTORY:  has a past surgical history that includes Parathyroidectomy (1969) and PERCUTANEOUS TRANSLUMINAL BALLOON ANGIOPLASTY WITH INSERTION OF STENT INTO CORONARY ARTERY N/A  (10/29/1997).  IMMUNIZATIONS:  Immunization History   Administered Date(s) Administered     COVID-19,PF,Moderna 01/08/2021     Influenza (High Dose) 3 valent vaccine 01/06/2017, 11/20/2018, 12/10/2018, 10/23/2019, 11/03/2020     Pneumo Conj 13-V (2010&after) 02/26/2020     Pneumococcal 23 valent 03/10/2000     TDAP Vaccine (Adacel) 10/30/2012     Td (Adult), Adsorbed 09/22/2000, 09/12/2002     Tdap (Adult) Unspecified Formulation 09/22/2000     Above immunizations pulled from [employer]. MIIC and facility records also reconciled. Outstanding information sent to  to update [facility].  Future immunizations are not needed at this point as all recommended immunizations are up to date.     Current Outpatient Medications   Medication Sig Dispense Refill     albuterol (PROAIR HFA/PROVENTIL HFA/VENTOLIN HFA) 108 (90 Base) MCG/ACT inhaler Inhale 2 puffs into the lungs every 4 hours as needed for shortness of breath / dyspnea or wheezing       clopidogrel (PLAVIX) 75 MG tablet Take 75 mg by mouth daily       escitalopram (LEXAPRO) 5 MG tablet Take 5 mg by mouth daily       ferrous fumarate 65 mg, [place]. FE,-Vitamin C 125 mg (VITRON C)  MG TABS tablet Take 1 tablet by mouth daily       fluticasone-salmeterol (ADVAIR) 500-50 MCG/DOSE inhaler Inhale 1 puff into the lungs every 12 hours for Asthma Rinse and spit after each  use.       hydrochlorothiazide (HYDRODIURIL) 25 MG tablet Take 25 mg by mouth daily every other day for blood pressure and fluid managment       isosorbide mononitrate (IMDUR) 30 MG 24 hr tablet Take 30 mg by mouth daily       lidocaine (LIDODERM) 5 % patch Place 1 patch onto the skin daily as needed for moderate pain To prevent lidocaine toxicity, patient should be patch free for 12 hrs daily.       losartan (COZAAR) 50 MG tablet Take 1.5 tablets (75 mg) by mouth daily       melatonin 3 MG tablet Take 6 mg by mouth nightly as needed        metoprolol tartrate (LOPRESSOR) 25 MG tablet Take 12.5 mg by mouth 2 times daily       nitroGLYcerin (NITROSTAT) 0.4 MG sublingual tablet Place 0.4 mg under the tongue every 5 minutes as needed for chest pain For chest pain place 1 tablet under the tongue every 5 minutes for 3 doses. If symptoms persist 5 minutes after 1st dose call 911.       pantoprazole (PROTONIX) 40 MG EC tablet Take 40 mg by mouth daily for Esophagitis Before breakfast       rosuvastatin (CRESTOR) 5 MG tablet Take 5 mg by mouth every other day At Bedtime       SENNA-docusate sodium (SENNA S) 8.6-50 MG tablet Take 1 tablet by mouth 2 times daily as needed       Vitamin D, Cholecalciferol, 25 MCG (1000 UT) TABS Take 1 tablet by mouth daily       zolpidem (AMBIEN) 5 MG tablet Take 2.5 mg by mouth nightly as needed for sleep       Case Management:  I have reviewed the facility/SNF care plan/MDS, including the falls risk, nutrition and pain screening. I also reviewed the current immunizations, and preventive care. .Future cancer screening is not clinically indicated secondary to age/goals of care Patient's desire to return to the community is present, but is not able due to care needs . Current Level of Care is appropriate.    Advance Directive Discussion:    I reviewed the current advanced directives as reflected in EPIC, the POLST and the facility chart, and verified the congruency of orders. I contacted the  "first party daughter and discussed the plan of Care. I did review the advance directives with the resident.     Team Discussion:  I communicated with the appropriate disciplines involved with the Plan of Care: Nursing    Patient's goal is pain control and comfort. Treat all conditions with Full Code status   Information reviewed:  Medications, vital signs, orders, and nursing notes.    ROS:  4 point ROS including Respiratory, CV, GI and , other than that noted in the HPI,  is negative    Vitals:  /73   Pulse 78   Temp 97.8  F (36.6  C)   Resp 20   Ht 1.549 m (5' 1\")   Wt 61.7 kg (136 lb)   SpO2 96%   BMI 25.70 kg/m   Body mass index is 25.7 kg/m .  Exam:  GENERAL APPEARANCE:  Alert, in no distress   ENT:  Mouth and posterior oropharynx normal, dry mucous membranes  EYES:  EOM, conjunctivae, right eye esotropia  RESP:  respiratory effort and palpation of chest normal, lungs clear  CV:  Palpation and auscultation of heart done , regular rate and rhythm, no murmur, rub, or gallop, trace edema-no compression   ABDOMEN:  no guarding or rebound  M/S:   kyphotic stance, some generalized weakness   SKIN:  intact to visualized areas-lesion on forehead is raised, dry and flaky   NEURO:   Cranial nerves 2-12 are normal tested and grossly at patient's baseline  PSYCH:  normal insight, judgement and memory    Lab/Diagnostic data:   CBC RESULTS:   Recent Labs   Lab Test 08/05/20   WBC 7.7   RBC 3.87   HGB 11.2*   HCT 37.6   MCV 97   MCH 28.9   MCHC 29.8*   RDW 13.4          Last Basic Metabolic Panel:  Recent Labs   Lab Test 08/05/20      POTASSIUM 3.8   CHLORIDE 104   NELSON 8.7   CO2 28   BUN 18   CR 0.78   GLC 89       Liver Function Studies - No results for input(s): PROTTOTAL, ALBUMIN, BILITOTAL, ALKPHOS, AST, ALT, BILIDIRECT in the last 11055 hours.    No results found for: TSH]    No results found for: A1C    ASSESSMENT/PLAN  (N39.0) Recurrent urinary tract infection  (primary encounter " diagnosis)  Comment: history or  Plan:   -consider nitrofurantoin 100 mg po daily for prophylaxis  -consider estradiol cream daily or vaginal supp  -encourage daily oral intake of water/fluids for urinary health  -sees uro/gyn for pessary ring use every 3 months     (F51.01) Primary insomnia  Comment: use of as needed Ambien 8 times in January but thinks used it more often?  Plan:   --melatonin 6 mg at HS prn mostly does not use  --ambien 2.5 mg daily PRN     (R29.6) Falls frequently  (R54) Frailty  Comment: no recent falls  Plan:   -continue low dose as possible for sedative medications     (I50.32) Chronic diastolic congestive heart failure (H)  (I48.20) Chronic atrial fibrillation (H)  (I70.0) Atherosclerosis of aorta (H)  (Z86.73) History of CVA (cerebrovascular accident)  (I11.9) Hypertensive heart disease without heart failure  Comment: BP average 146/76 HR74  Plan:   --continue with plavix 75 mg daily  --hydrochlorothiazide 25 mg every other daily  --losartan 75 mg daily  --isosorbide 30 mg daily  --metoprolol 12.5 mg po BID  --Continue to monitor blood pressure and adjust medications as needed.  --BMP periodically     (M79.671) Pain of right heel  Comment: intermittent at night   Plan:  -Tylenol 1,000 mg po TID PRN       Electronically signed by:  AZAEL Martínez CNP

## 2021-02-02 LAB
ALBUMIN SERPL-MCNC: 3.3 G/DL (ref 3.5–5)
ALP SERPL-CCNC: 68 U/L (ref 45–120)
ALT SERPL W P-5'-P-CCNC: 13 U/L (ref 0–45)
ANION GAP SERPL CALCULATED.3IONS-SCNC: 7 MMOL/L (ref 5–18)
AST SERPL W P-5'-P-CCNC: 19 U/L (ref 0–40)
BILIRUB DIRECT SERPL-MCNC: 0.2 MG/DL
BILIRUB SERPL-MCNC: 0.7 MG/DL (ref 0–1)
BUN SERPL-MCNC: 27 MG/DL (ref 8–28)
CALCIUM SERPL-MCNC: 9.1 MG/DL (ref 8.5–10.5)
CHLORIDE BLD-SCNC: 102 MMOL/L (ref 98–107)
CO2 SERPL-SCNC: 30 MMOL/L (ref 22–31)
CREAT SERPL-MCNC: 0.78 MG/DL (ref 0.6–1.1)
ERYTHROCYTE [DISTWIDTH] IN BLOOD BY AUTOMATED COUNT: 13 % (ref 11–14.5)
GFR SERPL CREATININE-BSD FRML MDRD: >60 ML/MIN/1.73M2
GLUCOSE BLD-MCNC: 91 MG/DL (ref 70–125)
HCT VFR BLD AUTO: 36.2 % (ref 35–47)
HGB BLD-MCNC: 11.4 G/DL (ref 12–16)
MCH RBC QN AUTO: 30.2 PG (ref 27–34)
MCHC RBC AUTO-ENTMCNC: 31.5 G/DL (ref 32–36)
MCV RBC AUTO: 96 FL (ref 80–100)
PLATELET # BLD AUTO: 224 THOU/UL (ref 140–440)
PMV BLD AUTO: 11.6 FL (ref 8.5–12.5)
POTASSIUM BLD-SCNC: 3.6 MMOL/L (ref 3.5–5)
PROT SERPL-MCNC: 6.6 G/DL (ref 6–8)
RBC # BLD AUTO: 3.77 MILL/UL (ref 3.8–5.4)
SODIUM SERPL-SCNC: 139 MMOL/L (ref 136–145)
TSH SERPL DL<=0.005 MIU/L-ACNC: 2.28 UIU/ML (ref 0.3–5)
WBC: 7.6 THOU/UL (ref 4–11)

## 2021-03-09 DIAGNOSIS — F51.01 PRIMARY INSOMNIA: Primary | ICD-10-CM

## 2021-03-09 RX ORDER — ZOLPIDEM TARTRATE 5 MG/1
2.5 TABLET ORAL
Qty: 180 TABLET | Refills: 0 | Status: SHIPPED | OUTPATIENT
Start: 2021-03-09 | End: 2021-01-01

## 2021-04-08 NOTE — TELEPHONE ENCOUNTER
[facility] Nurse Triage Telephone Note    Provider: Hao Araujo NP  Facility: [facility]   Facility Type:  [facility]    Caller: Osvaldo  Call Back Number: [phone]    Allergies   Allergen Reactions     Sulfamethoxazole-Trimethoprim Rash     Levofloxacin Other (See Comments)     Red streaks on the side of nose & red cheeks     Clarithromycin Unknown     Mirtazapine Other (See Comments)     Grogginess in the morning after taking     Penicillin G Other (See Comments)     Penicillin consult. May use penicillin and cephalosporin - 4/24/19        Reason for call: Nurse calling to update provider on pt condition. Pt has been experiencing loose stools for past 3-4 days. Pt no longer having loose stools today. Eating well and taking fluids. Rapid COVID-19 test result is negative.   Vitals: BP:  145/53  P:: 67  R:: 18  SPO2: 93% R/A Temp.:  97.2     Verbal Order/Direction given by Provider: CATHERINEI sent to provider.    Tamia García RN

## 2021-04-09 NOTE — TELEPHONE ENCOUNTER
[facility] TELEPHONE ENCOUNTER    Chief Complaint   Patient presents with     Confusion       Ermelinda Pfeiffer is a 89 year old  (10/19/1930),Nurse called today to report that resident had four days of diarrhea which has now resolved. Staff and family have noted that she has been more confused today and family is very concerned. Orthostatics were obtained and negative, all vitals are stable, she is afebrile. She is not drinking fluids well, PVRs have been 0.     ASSESSMENT/PLAN  Confusion    Given that she had 4 days of loose stool, wonder if she now has an ecoli UTI that is causing the confusion. Furthermore, the confusion could be due to underlying infection as well but also due to dehydration from the diarrhea earlier this week and decreased fluid intake.     Will get stat BMP/CBC and UA/C via straight cath.     Will give IM rochepin 1 g today after the urine culture is obtained.     If BMP shows dehydration, family will likely want IV fluids.     Staff to push 500 ml of water each day and evening shift    Electronically signed by:   AZAEL Austin CNP

## 2021-04-12 NOTE — PROGRESS NOTES
[facility]  [facility] Record Number:  [id number]  Place of Service where encounter took place:  Formerly [facility], later [facility] () [[id number]]  Chief Complaint   Patient presents with     Nursing Home Acute       HPI:    Ermelinda Pfeiffer  is a 89 year old (10/19/1930), who is being seen today for an episodic care visit.  HPI information obtained from: facility chart records, patient report, [facility] chart review and family/first contact daughter Connor report. Today's concern is:    Daughter reports diarrhea on and off for approximately 3 days with increased weakness. Hx of UTIs so UA/UC done and with >100K E coli today. BMP and CBC done onsite reviewed and mostly WNL. Does have slight left shift on CBC but white count not elevated. In her room she is wondering about going to the hospital for treatment. She denies dysuria, hematuria but is having trouble urinating. Does have pessary in place. PVR have been negative.     Past Medical and Surgical History reviewed in Epic today.    MEDICATIONS:    Current Outpatient Medications   Medication Sig Dispense Refill     nitroFURantoin macrocrystal-monohydrate (MACROBID) 100 MG capsule Take 1 capsule (100 mg) by mouth 2 times daily for 5 days 10 capsule 0     acetaminophen (TYLENOL) 500 MG tablet Take 2 tablets (1,000 mg) by mouth 3 times daily as needed for pain       clopidogrel (PLAVIX) 75 MG tablet Take 75 mg by mouth daily       escitalopram (LEXAPRO) 5 MG tablet Take 5 mg by mouth daily       ferrous fumarate 65 mg, [place]. FE,-Vitamin C 125 mg (VITRON C)  MG TABS tablet Take 1 tablet by mouth daily       fluticasone-salmeterol (ADVAIR) 500-50 MCG/DOSE inhaler Inhale 1 puff into the lungs every 12 hours for Asthma Rinse and spit after each use.       hydrochlorothiazide (HYDRODIURIL) 25 MG tablet Take 25 mg by mouth daily every other day for blood pressure and fluid managment       isosorbide mononitrate (IMDUR) 30 MG 24 hr tablet Take 30 mg by  "mouth daily       lidocaine (LIDODERM) 5 % patch Place 1 patch onto the skin daily as needed for moderate pain To prevent lidocaine toxicity, patient should be patch free for 12 hrs daily.       losartan (COZAAR) 50 MG tablet Take 1.5 tablets (75 mg) by mouth daily       melatonin 3 MG tablet Take 6 mg by mouth nightly as needed        metoprolol tartrate (LOPRESSOR) 25 MG tablet Take 12.5 mg by mouth 2 times daily       nitroGLYcerin (NITROSTAT) 0.4 MG sublingual tablet Place 0.4 mg under the tongue every 5 minutes as needed for chest pain For chest pain place 1 tablet under the tongue every 5 minutes for 3 doses. If symptoms persist 5 minutes after 1st dose call 911.       pantoprazole (PROTONIX) 40 MG EC tablet Take 40 mg by mouth daily for Esophagitis Before breakfast       rosuvastatin (CRESTOR) 5 MG tablet Take 5 mg by mouth every other day At Bedtime       SENNA-docusate sodium (SENNA S) 8.6-50 MG tablet Take 1 tablet by mouth 2 times daily as needed       Vitamin D, Cholecalciferol, 25 MCG (1000 UT) TABS Take 1 tablet by mouth daily       zolpidem (AMBIEN) 5 MG tablet Take 0.5 tablets (2.5 mg) by mouth nightly as needed for sleep 180 tablet 0     REVIEW OF SYSTEMS:  4 point ROS including Respiratory, CV, GI and , other than that noted in the HPI,  is negative    Objective:  /67   Pulse 63   Temp 97.5  F (36.4  C)   Resp 18   Ht 1.549 m (5' 1\")   Wt 59.8 kg (131 lb 12.8 oz)   SpO2 95%   BMI 24.90 kg/m    Exam:  GENERAL APPEARANCE:  Alert, in no distress   ENT:  Mouth and posterior oropharynx normal, dry mucous membranes  EYES:  EOM, conjunctivae, right eye esotropia  RESP:  respiratory effort and palpation of chest normal, lungs clear  CV:  Palpation and auscultation of heart done , regular rate and rhythm, no murmur, rub, or gallop, trace edema-no compression, no socks today  ABDOMEN:  no guarding or rebound  M/S:   kyphotic stance, some generalized weakness   SKIN:  intact to visualized " areas-lesion on forehead is raised, dry and flaky   NEURO:   Cranial nerves 2-12 are normal tested and grossly at patient's baseline  PSYCH:  normal insight, judgement and memory    Labs:   Reviewed CBC and BMP done at facility today    ASSESSMENT/PLAN:  (N30.00) Acute cystitis without hematuria   (N39.0) Recurrent urinary tract infection    Comment: history of  Plan:   -consider nitrofurantoin 100 mg po daily for prophylaxis  -nitrofurantoin 100 mg po BID x 5 days  -consider estradiol cream daily or vaginal supp  -encourage daily oral intake of water/fluids for urinary health  -sees uro/gyn for pessary ring use every 3 months                   Electronically signed by:  AZAEL Martínez CNP

## 2021-04-12 NOTE — LETTER
4/12/2021        RE: Ermelinda Pfeiffer  [address]  [address]  [place] [address]        [facility]  [facility] Record Number:  [id number]  Place of Service where encounter took place:  Formerly [facility] () [[id number]]  Chief Complaint   Patient presents with     Nursing Home Acute       HPI:    Ermelinda Pfeiffer  is a 89 year old (10/19/1930), who is being seen today for an episodic care visit.  HPI information obtained from: facility chart records, patient report, [facility] chart review and family/first contact daughter Connor report. Today's concern is:    Daughter reports diarrhea on and off for approximately 3 days with increased weakness. Hx of UTIs so UA/UC done and with >100K E coli today. BMP and CBC done onsite reviewed and mostly WNL. Does have slight left shift on CBC but white count not elevated. In her room she is wondering about going to the hospital for treatment. She denies dysuria, hematuria but is having trouble urinating. Does have pessary in place. PVR have been negative.     Past Medical and Surgical History reviewed in Epic today.    MEDICATIONS:    Current Outpatient Medications   Medication Sig Dispense Refill     nitroFURantoin macrocrystal-monohydrate (MACROBID) 100 MG capsule Take 1 capsule (100 mg) by mouth 2 times daily for 5 days 10 capsule 0     acetaminophen (TYLENOL) 500 MG tablet Take 2 tablets (1,000 mg) by mouth 3 times daily as needed for pain       clopidogrel (PLAVIX) 75 MG tablet Take 75 mg by mouth daily       escitalopram (LEXAPRO) 5 MG tablet Take 5 mg by mouth daily       ferrous fumarate 65 mg, [place]. FE,-Vitamin C 125 mg (VITRON C)  MG TABS tablet Take 1 tablet by mouth daily       fluticasone-salmeterol (ADVAIR) 500-50 MCG/DOSE inhaler Inhale 1 puff into the lungs every 12 hours for Asthma Rinse and spit after each use.       hydrochlorothiazide (HYDRODIURIL) 25 MG tablet Take 25 mg by mouth daily every other day  "for blood pressure and fluid managment       isosorbide mononitrate (IMDUR) 30 MG 24 hr tablet Take 30 mg by mouth daily       lidocaine (LIDODERM) 5 % patch Place 1 patch onto the skin daily as needed for moderate pain To prevent lidocaine toxicity, patient should be patch free for 12 hrs daily.       losartan (COZAAR) 50 MG tablet Take 1.5 tablets (75 mg) by mouth daily       melatonin 3 MG tablet Take 6 mg by mouth nightly as needed        metoprolol tartrate (LOPRESSOR) 25 MG tablet Take 12.5 mg by mouth 2 times daily       nitroGLYcerin (NITROSTAT) 0.4 MG sublingual tablet Place 0.4 mg under the tongue every 5 minutes as needed for chest pain For chest pain place 1 tablet under the tongue every 5 minutes for 3 doses. If symptoms persist 5 minutes after 1st dose call 911.       pantoprazole (PROTONIX) 40 MG EC tablet Take 40 mg by mouth daily for Esophagitis Before breakfast       rosuvastatin (CRESTOR) 5 MG tablet Take 5 mg by mouth every other day At Bedtime       SENNA-docusate sodium (SENNA S) 8.6-50 MG tablet Take 1 tablet by mouth 2 times daily as needed       Vitamin D, Cholecalciferol, 25 MCG (1000 UT) TABS Take 1 tablet by mouth daily       zolpidem (AMBIEN) 5 MG tablet Take 0.5 tablets (2.5 mg) by mouth nightly as needed for sleep 180 tablet 0     REVIEW OF SYSTEMS:  4 point ROS including Respiratory, CV, GI and , other than that noted in the HPI,  is negative    Objective:  /67   Pulse 63   Temp 97.5  F (36.4  C)   Resp 18   Ht 1.549 m (5' 1\")   Wt 59.8 kg (131 lb 12.8 oz)   SpO2 95%   BMI 24.90 kg/m    Exam:  GENERAL APPEARANCE:  Alert, in no distress   ENT:  Mouth and posterior oropharynx normal, dry mucous membranes  EYES:  EOM, conjunctivae, right eye esotropia  RESP:  respiratory effort and palpation of chest normal, lungs clear  CV:  Palpation and auscultation of heart done , regular rate and rhythm, no murmur, rub, or gallop, trace edema-no compression, no socks " today  ABDOMEN:  no guarding or rebound  M/S:   kyphotic stance, some generalized weakness   SKIN:  intact to visualized areas-lesion on forehead is raised, dry and flaky   NEURO:   Cranial nerves 2-12 are normal tested and grossly at patient's baseline  PSYCH:  normal insight, judgement and memory    Labs:   Reviewed CBC and BMP done at facility today    ASSESSMENT/PLAN:  (N30.00) Acute cystitis without hematuria   (N39.0) Recurrent urinary tract infection    Comment: history of  Plan:   -consider nitrofurantoin 100 mg po daily for prophylaxis  -nitrofurantoin 100 mg po BID x 5 days  -consider estradiol cream daily or vaginal supp  -encourage daily oral intake of water/fluids for urinary health  -sees uro/gyn for pessary ring use every 3 months                   Electronically signed by:  AZAEL Martínez CNP                 Sincerely,        AZAEL Martínez CNP

## 2021-04-19 NOTE — PROGRESS NOTES
Pt was seen for a regulatory LTC visit    Course reviewed with NP    Pt was hospitalized at [facility] from 4/13-4/15/21 for the treatment of acute SOB with hypoxia, hypertension. She was treated with Bipap, NTG, nebs, one dose of IV lasix, with improvement in symptoms, 02 sats and BP.  BP stable with resumption of PTA BP medications.  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    History of diastolic heart failure. Last echocardiogram 2020 revealed LV ejection fraction of 51%, mild aortic valve regurgitation, mild mitral valve regurgitation, moderate to severe tricuspid valve regurgitation    She was continued with treatment  with for a E coli UTI (started prior to hospitalization)    Pt states her breathing is improved.  She has an occasional cough, particularly while eating. She is being seen by speech therapy for this. She notes continued generalized weakness which preceded her hospitalization. She denies dysuria abdominal pain, fevers or chills.    Discharge medications included hydrochlorthiazide 25 mg every other day, Imdur 30 mg daily, losartan 75 mg daily, metoprolol 12.5 mg twice daily. Plavix 75 mg daily        On exam, she is alert, fully oriented, sitting up in bed.  Respiratory rate is 12 and unlabored  Most recent blood pressure 153/79  Heart rate seventies  Lungs clear  CV regular rhythm. No JVD  Abdomen soft, nontender  No presacral or lower extremity edema.    Assessment    Episode of acute hypoxic respiratory failure associated with severe hypertension, improved with above treatments. Suspicious for acute decompensation of chronic congestive heart failure in the setting of severe hypertension. Unclear if acute episode representative of acute ischemia, or valve dysfunction) versus severe hypertension as primary process. History of significant tricuspid valve regurgitation.  Patient appears back to baseline from respiratory standpoint. She does note some coughing with oral  intake. Episode however does not seem suggestive of aspiration as a primary process.    Diastolic heart failure, chronic, with significant tricuspid regurgitation    Asthma, appears stable    Generalized deconditioning, possibly related to chronic heart failure.    Plan  Control of hypertension will be paramount.  Goal systolic blood pressure should be 120-130  There is room to increase either beta-blocker or long-acting nitrates to achieve this.  Physical therapy and Occupational Therapy ordered  Speech therapy ordered to assess swallowing function

## 2021-04-19 NOTE — PROGRESS NOTES
"[facility]  PRIMARY CARE PROVIDER AND CLINIC:  Hao Araujo, APRN CNP, [address] / [place] [address]  Chief Complaint   Patient presents with     Hospital F/U     [facility] Medical Record Number:  [id number]  Place of Service where encounter took place:  [facility] () [[id number]]    Ermelinda Pfeiffer  is a 89 year old  (10/19/1930), returned to the above facility from  [facility] . Hospital stay 4/13/21 - 4/15/21. .  Admitted to this facility for  rehab, medical management and nursing care.    HPI:    HPI information obtained from: facility chart records, patient report and [facility] chart review.   Brief Summary of Hospital Course: As noted by MD visit 4/19/21:acute SOB with hypoxia, hypertension. She was treated with Bipap, NTG, nebs, one dose of IV lasix, with improvement in symptoms, 02 sats and BP.  BP stable with resumption of PTA BP medications.  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    Updates on Status Since Skilled nursing Admission:   Initially given a dose of HCTZ on non-administration day with good response to lower BP. She has been mostly disagreeable to taking a \"water pill\" daily. Reviewed with MD and increased metoprolol to 25 mg po daily but BPs continue above goal. Thought not related to fluid/volume status.    History of frequent UTIs and also treated with this last hospital visit. Use of pessary and follows with urology. We have discussed prophylaxis dosing with Macrodantin (see allergies).    With overall generalized weakness working with PT. Has refused some sessions. Speech for some dysphagia. No changes to diet order.    CODE STATUS/ADVANCE DIRECTIVES DISCUSSION:   CPR/Full code   Patient's living condition: lives in a skilled nursing facility  ALLERGIES: Sulfamethoxazole-trimethoprim, Levofloxacin, Clarithromycin, Mirtazapine, and Penicillin g  PAST MEDICAL HISTORY:  has a past medical " history of Acute sinus infection, Anemia, CAD (coronary artery disease), Cancer of the skin, basal cell, Cataract, Depressive disorder, Diplopia, Disturbance, sleep, GERD (gastroesophageal reflux disease), HTN (hypertension), Hyperlipidemia, Insomnia, Myocardial infarction (H), Osteoporosis, Skin cancer, Status post parathyroidectomy (05/23/2016), Stone, kidney, and Transient ischemic attack.  PAST SURGICAL HISTORY:   has a past surgical history that includes Parathyroidectomy (1969) and PERCUTANEOUS TRANSLUMINAL BALLOON ANGIOPLASTY WITH INSERTION OF STENT INTO CORONARY ARTERY N/A  (10/29/1997).  FAMILY HISTORY: family history includes Suicide in her maternal aunt and maternal uncle.  SOCIAL HISTORY:   reports that she has quit smoking. She has a 3.25 pack-year smoking history. She has never used smokeless tobacco. She reports that she does not drink alcohol or use drugs.    Post Discharge Medication Reconciliation Status: discharge medications reconciled and changed, per note/orders    Current Outpatient Medications   Medication Sig Dispense Refill     albuterol (PROAIR HFA/PROVENTIL HFA/VENTOLIN HFA) 108 (90 Base) MCG/ACT inhaler Inhale 2 puffs into the lungs every 4 hours as needed for shortness of breath / dyspnea or wheezing       clopidogrel (PLAVIX) 75 MG tablet Take 75 mg by mouth daily       escitalopram (LEXAPRO) 5 MG tablet Take 5 mg by mouth daily       ferrous fumarate 65 mg, [place]. FE,-Vitamin C 125 mg (VITRON C)  MG TABS tablet Take 1 tablet by mouth daily       fluticasone-salmeterol (ADVAIR) 500-50 MCG/DOSE inhaler Inhale 1 puff into the lungs every 12 hours for Asthma Rinse and spit after each use.       hydrochlorothiazide (HYDRODIURIL) 25 MG tablet Take 25 mg by mouth daily every other day for blood pressure and fluid managment       isosorbide mononitrate (IMDUR) 30 MG 24 hr tablet Take 1 tablet (30 mg) by mouth daily AND 0.5 tablets (15 mg) daily. 30 tablet 4     lidocaine (LIDODERM) 5 %  "patch Place 1 patch onto the skin daily as needed for moderate pain To prevent lidocaine toxicity, patient should be patch free for 12 hrs daily.       losartan (COZAAR) 50 MG tablet Take 1.5 tablets (75 mg) by mouth daily       metoprolol tartrate (LOPRESSOR) 25 MG tablet Take 1 tablet (25 mg) by mouth 2 times daily Hold for SBP <100 or HR <60       nitroGLYcerin (NITROSTAT) 0.4 MG sublingual tablet Place 0.4 mg under the tongue every 5 minutes as needed for chest pain For chest pain place 1 tablet under the tongue every 5 minutes for 3 doses. If symptoms persist 5 minutes after 1st dose call 911.       nystatin (MYCOSTATIN) 596797 UNIT/GM external powder Apply topically as needed       pantoprazole (PROTONIX) 40 MG EC tablet Take 40 mg by mouth daily for Esophagitis Before breakfast       rosuvastatin (CRESTOR) 5 MG tablet Take 5 mg by mouth every other day At Bedtime       SENNA-docusate sodium (SENNA S) 8.6-50 MG tablet Take 1 tablet by mouth 2 times daily as needed       Vitamin D, Cholecalciferol, 25 MCG (1000 UT) TABS Take 1 tablet by mouth daily       zolpidem (AMBIEN) 5 MG tablet Take 0.5 tablets (2.5 mg) by mouth nightly as needed for sleep 180 tablet 0     ROS:  Limited secondary to cognitive impairment but today pt reports fine    Vitals:  BP (!) 173/87   Pulse 75   Temp 97.7  F (36.5  C)   Resp 18   Ht 1.549 m (5' 1\")   Wt 64.2 kg (141 lb 9.6 oz)   SpO2 97%   BMI 26.76 kg/m    Exam:  GENERAL APPEARANCE:  Alert, in no distress   ENT:  Mouth and posterior oropharynx normal, dry mucous membranes  EYES:  EOM, conjunctivae, right eye esotropia  RESP:  respiratory effort and palpation of chest normal, lungs clear but diminished   CV:  Palpation and auscultation of heart done , regular rate and rhythm, no murmur, rub, or gallop, trace edema-no compression, no socks today  ABDOMEN:  no guarding or rebound  M/S:   kyphotic stance, some generalized weakness   SKIN:  intact to visualized areas-lesion on " forehead is raised, dry and flaky, left nasal lesion with dried blood   NEURO:   Cranial nerves 2-12 are normal tested and grossly at patient's baseline  PSYCH:  normal insight, judgement and memory    Lab/Diagnostic data:  BMP and CBC for today are pending     ASSESSMENT/PLAN:  (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 BP reduced with rest. Ischemic demand, valve dysfunction, or primary process? Does not appear in fluid overload   Plan:   -isosorbide mononitrate (IMDUR) ER from 30 mg po daily to 45 mg po daily   -metoprolol 25 mg po BID  -losartan 75 mg po daily  -HCTZ 25 mg po every other day  -BMP/CBC today and 4/30  -HR/BP TID for now with weekly weights   -nitroglycerin prn    (N39.0) Recurrent urinary tract infection  Comment: risk for more infection   Plan:   -consider nitrofurantoin 100 mg po daily for prophylaxis  -consider estradiol cream daily or vaginal supp  -encourage daily oral intake of water/fluids for urinary health  -sees uro/gyn for pessary ring use every 3 months     (M79.671,  M79.672) Heel pain, bilateral  Comment: chronic with flares-unknown reason  Plan:  -float heels  -muscle rub TID to start     -unclear if will pursue derm f/u.         Electronically signed by:  AZAEL Martínez CNP

## 2021-04-23 NOTE — LETTER
"    4/19/2021        RE: Emrelinda Pfeiffer  [facility]  [address] [address]        [place] [facility]  PRIMARY CARE PROVIDER AND CLINIC:  AZAEL Martínez CNP, [address] [place] [address] / [facility] [address]  Chief Complaint   Patient presents with     Hospital F/U     [place] Medical Record Number:  [id number]  Place of Service where encounter took place:  [facility] () [[id number]]    Ermelinda Pfeiffer  is a 89 year old  (10/19/1930), returned to the above facility from  [facility] . Hospital stay 4/13/21 - 4/15/21. .  Admitted to this facility for  rehab, medical management and nursing care.    HPI:    HPI information obtained from: facility chart records, patient report and [facility] chart review.   Brief Summary of Hospital Course: As noted by MD visit 4/19/21:acute SOB with hypoxia, hypertension. She was treated with Bipap, NTG, nebs, one dose of IV lasix, with improvement in symptoms, 02 sats and BP.  BP stable with resumption of PTA BP medications.  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    Updates on Status Since Skilled nursing Admission:   Initially given a dose of HCTZ on non-administration day with good response to lower BP. She has been mostly disagreeable to taking a \"water pill\" daily. Reviewed with MD and increased metoprolol to 25 mg po daily but BPs continue above goal. Thought not related to fluid/volume status.    History of frequent UTIs and also treated with this last hospital visit. Use of pessary and follows with urology. We have discussed prophylaxis dosing with Macrodantin (see allergies).    With overall generalized weakness working with PT. Has refused some sessions. Speech for some dysphagia. No changes to diet order.    CODE STATUS/ADVANCE DIRECTIVES DISCUSSION:   CPR/Full code   Patient's living condition: lives in a skilled nursing facility  ALLERGIES: " Sulfamethoxazole-trimethoprim, Levofloxacin, Clarithromycin, Mirtazapine, and Penicillin g  PAST MEDICAL HISTORY:  has a past medical history of Acute sinus infection, Anemia, CAD (coronary artery disease), Cancer of the skin, basal cell, Cataract, Depressive disorder, Diplopia, Disturbance, sleep, GERD (gastroesophageal reflux disease), HTN (hypertension), Hyperlipidemia, Insomnia, Myocardial infarction (H), Osteoporosis, Skin cancer, Status post parathyroidectomy (05/23/2016), Stone, kidney, and Transient ischemic attack.  PAST SURGICAL HISTORY:   has a past surgical history that includes Parathyroidectomy (1969) and PERCUTANEOUS TRANSLUMINAL BALLOON ANGIOPLASTY WITH INSERTION OF STENT INTO CORONARY ARTERY N/A  (10/29/1997).  FAMILY HISTORY: family history includes Suicide in her maternal aunt and maternal uncle.  SOCIAL HISTORY:   reports that she has quit smoking. She has a 3.25 pack-year smoking history. She has never used smokeless tobacco. She reports that she does not drink alcohol or use drugs.    Post Discharge Medication Reconciliation Status: discharge medications reconciled and changed, per note/orders    Current Outpatient Medications   Medication Sig Dispense Refill     albuterol (PROAIR HFA/PROVENTIL HFA/VENTOLIN HFA) 108 (90 Base) MCG/ACT inhaler Inhale 2 puffs into the lungs every 4 hours as needed for shortness of breath / dyspnea or wheezing       clopidogrel (PLAVIX) 75 MG tablet Take 75 mg by mouth daily       escitalopram (LEXAPRO) 5 MG tablet Take 5 mg by mouth daily       ferrous fumarate 65 mg, [place]. FE,-Vitamin C 125 mg (VITRON C)  MG TABS tablet Take 1 tablet by mouth daily       fluticasone-salmeterol (ADVAIR) 500-50 MCG/DOSE inhaler Inhale 1 puff into the lungs every 12 hours for Asthma Rinse and spit after each use.       hydrochlorothiazide (HYDRODIURIL) 25 MG tablet Take 25 mg by mouth daily every other day for blood pressure and fluid managment       isosorbide mononitrate  "(IMDUR) 30 MG 24 hr tablet Take 1 tablet (30 mg) by mouth daily AND 0.5 tablets (15 mg) daily. 30 tablet 4     lidocaine (LIDODERM) 5 % patch Place 1 patch onto the skin daily as needed for moderate pain To prevent lidocaine toxicity, patient should be patch free for 12 hrs daily.       losartan (COZAAR) 50 MG tablet Take 1.5 tablets (75 mg) by mouth daily       metoprolol tartrate (LOPRESSOR) 25 MG tablet Take 1 tablet (25 mg) by mouth 2 times daily Hold for SBP <100 or HR <60       nitroGLYcerin (NITROSTAT) 0.4 MG sublingual tablet Place 0.4 mg under the tongue every 5 minutes as needed for chest pain For chest pain place 1 tablet under the tongue every 5 minutes for 3 doses. If symptoms persist 5 minutes after 1st dose call 911.       nystatin (MYCOSTATIN) 918446 UNIT/GM external powder Apply topically as needed       pantoprazole (PROTONIX) 40 MG EC tablet Take 40 mg by mouth daily for Esophagitis Before breakfast       rosuvastatin (CRESTOR) 5 MG tablet Take 5 mg by mouth every other day At Bedtime       SENNA-docusate sodium (SENNA S) 8.6-50 MG tablet Take 1 tablet by mouth 2 times daily as needed       Vitamin D, Cholecalciferol, 25 MCG (1000 UT) TABS Take 1 tablet by mouth daily       zolpidem (AMBIEN) 5 MG tablet Take 0.5 tablets (2.5 mg) by mouth nightly as needed for sleep 180 tablet 0     ROS:  Limited secondary to cognitive impairment but today pt reports fine    Vitals:  BP (!) 173/87   Pulse 75   Temp 97.7  F (36.5  C)   Resp 18   Ht 1.549 m (5' 1\")   Wt 64.2 kg (141 lb 9.6 oz)   SpO2 97%   BMI 26.76 kg/m    Exam:  GENERAL APPEARANCE:  Alert, in no distress   ENT:  Mouth and posterior oropharynx normal, dry mucous membranes  EYES:  EOM, conjunctivae, right eye esotropia  RESP:  respiratory effort and palpation of chest normal, lungs clear but diminished   CV:  Palpation and auscultation of heart done , regular rate and rhythm, no murmur, rub, or gallop, trace edema-no compression, no socks " today  ABDOMEN:  no guarding or rebound  M/S:   kyphotic stance, some generalized weakness   SKIN:  intact to visualized areas-lesion on forehead is raised, dry and flaky, left nasal lesion with dried blood   NEURO:   Cranial nerves 2-12 are normal tested and grossly at patient's baseline  PSYCH:  normal insight, judgement and memory    Lab/Diagnostic data:  BMP and CBC for today are pending     ASSESSMENT/PLAN:  (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 BP reduced with rest. Ischemic demand, valve dysfunction, or primary process? Does not appear in fluid overload   Plan:   -isosorbide mononitrate (IMDUR) ER from 30 mg po daily to 45 mg po daily   -metoprolol 25 mg po BID  -losartan 75 mg po daily  -HCTZ 25 mg po every other day  -BMP/CBC today and 4/30  -HR/BP TID for now with weekly weights   -nitroglycerin prn    (N39.0) Recurrent urinary tract infection  Comment: risk for more infection   Plan:   -consider nitrofurantoin 100 mg po daily for prophylaxis  -consider estradiol cream daily or vaginal supp  -encourage daily oral intake of water/fluids for urinary health  -sees uro/gyn for pessary ring use every 3 months     (M79.671,  M79.672) Heel pain, bilateral  Comment: chronic with flares-unknown reason  Plan:  -float heels  -muscle rub TID to start     -unclear if will pursue derm f/u.         Electronically signed by:  AZAEL Martínez CNP                           Sincerely,        AZAEL Martínez CNP

## 2021-04-30 NOTE — PROGRESS NOTES
[facility]  [place] Medical Record Number:  [id number]  Place of Service where encounter took place:  [facility] () [[id number]]  Chief Complaint   Patient presents with     Nursing Home Acute       HPI:    Ermelinda Pfeiffer  is a 89 year old (10/19/1930), who is being seen today for an episodic care visit.  HPI information obtained from: facility chart records, patient report and [facility] chart review. Today's concern is:    She is feeling much fatigue with recent increases to Imdur (30 mg to 45 mg) and metoprolol 12.5 mg po BID to 25 mg po BID. Per MD notes history of diastolic heart failure. Last echocardiogram 2020 revealed LV ejection fraction of 51%, mild aortic valve regurgitation, mild mitral valve regurgitation, moderate to severe tricuspid valve regurgitation. He relates suspicious for acute decompensation of chronic congestive heart failure in the setting of severe hypertension and that control of HTN will be paramount with goal SBP at 120-130 with move to either beta-blocker or long-acting nitrates to achieve.     Currently BPs taken TID some meeting goal and others elevated but did see overall improvement with increases:    127/66  168/78  156/64  124/61  154/70  138/73  125/67  168/70      Past Medical and Surgical History reviewed in Epic today.    MEDICATIONS:    Current Outpatient Medications   Medication Sig Dispense Refill     metoprolol tartrate (LOPRESSOR) 25 MG tablet Take 1 tablet (25 mg) by mouth 2 times daily Hold for SBP <100 or HR <60. Goal -130       albuterol (PROAIR HFA/PROVENTIL HFA/VENTOLIN HFA) 108 (90 Base) MCG/ACT inhaler Inhale 2 puffs into the lungs every 4 hours as needed for shortness of breath / dyspnea or wheezing       clopidogrel (PLAVIX) 75 MG tablet Take 75 mg by mouth daily       escitalopram (LEXAPRO) 5 MG tablet Take 5 mg by mouth daily       ferrous fumarate 65 mg, [place]. FE,-Vitamin C 125 mg (VITRON C)  MG TABS tablet  "Take 1 tablet by mouth daily       fluticasone-salmeterol (ADVAIR) 500-50 MCG/DOSE inhaler Inhale 1 puff into the lungs every 12 hours for Asthma Rinse and spit after each use.       hydrochlorothiazide (HYDRODIURIL) 25 MG tablet Take 25 mg by mouth daily every other day for blood pressure and fluid managment       isosorbide mononitrate (IMDUR) 30 MG 24 hr tablet Take 1 tablet (30 mg) by mouth daily AND 0.5 tablets (15 mg) daily. 30 tablet 4     lidocaine (LIDODERM) 5 % patch Place 1 patch onto the skin daily as needed for moderate pain To prevent lidocaine toxicity, patient should be patch free for 12 hrs daily.       losartan (COZAAR) 50 MG tablet Take 1.5 tablets (75 mg) by mouth daily       nitroGLYcerin (NITROSTAT) 0.4 MG sublingual tablet Place 0.4 mg under the tongue every 5 minutes as needed for chest pain For chest pain place 1 tablet under the tongue every 5 minutes for 3 doses. If symptoms persist 5 minutes after 1st dose call 911.       nystatin (MYCOSTATIN) 710884 UNIT/GM external powder Apply topically as needed       pantoprazole (PROTONIX) 40 MG EC tablet Take 40 mg by mouth daily for Esophagitis Before breakfast       rosuvastatin (CRESTOR) 5 MG tablet Take 5 mg by mouth every other day At Bedtime       SENNA-docusate sodium (SENNA S) 8.6-50 MG tablet Take 1 tablet by mouth 2 times daily as needed       Vitamin D, Cholecalciferol, 25 MCG (1000 UT) TABS Take 1 tablet by mouth daily       zolpidem (AMBIEN) 5 MG tablet Take 0.5 tablets (2.5 mg) by mouth nightly as needed for sleep 180 tablet 0     REVIEW OF SYSTEMS:  4 point ROS including Respiratory, CV, GI and , other than that noted in the HPI,  is negative    Objective:  BP (!) 144/72   Pulse 68   Temp 97.7  F (36.5  C)   Resp 18   Ht 1.549 m (5' 1\")   Wt 62.4 kg (137 lb 8 oz)   SpO2 97%   BMI 25.98 kg/m    Exam:  GENERAL APPEARANCE:  Alert, in no distress,laying in bed  ENT:  Mouth and posterior oropharynx normal, dry mucous " membranes  EYES:  EOM, conjunctivae of left eye with subconjunctival hemorrhage,  right eye esotropia  RESP:  respiratory effort and palpation of chest normal, lungs clear but diminished   CV:  Palpation and auscultation of heart done , regular rate and rhythm, 3/6 murmur, trace edema-no compression  ABDOMEN:  no guarding or rebound  M/S:   kyphotic stance, some generalized weakness   SKIN:  intact to visualized areas-lesion on forehead is raised, dry and flaky, left nasal lesion, great toenail of right foot with darken  NEURO:   Cranial nerves 2-12 are normal tested and grossly at patient's baseline  PSYCH:  normal insight, judgement and memory    Labs:   BMP and CBC from 4/30/21 reviewed in [facility]    ASSESSMENT/PLAN:  (I11.0) Hypertensive heart disease with heart failure (H)  (primary encounter diagnosis)  Comment: some improvement in overall BPs with increase to metoprolol and Imdur but she would like to hold on any additional changes  Plan:   -isosorbide mononitrate (IMDUR) ER from 30 mg po daily to 45 mg po daily   -metoprolol 25 mg po BID  -losartan 75 mg po daily  -HCTZ 25 mg po every other day  -HR/BP TID for now with weekly weights   -nitroglycerin prn          Electronically signed by:  AZAEL Martínez CNP

## 2021-04-30 NOTE — LETTER
4/30/2021        RE: Ermelinda Pfeiffer  [address] [address]        [place] [facility]  [place] Medical Record Number:  [id number]  Place of Service where encounter took place:  [facility] () [[id number]]  Chief Complaint   Patient presents with     Nursing Home Acute       HPI:    Ermelinda Pfeiffer  is a 89 year old (10/19/1930), who is being seen today for an episodic care visit.  HPI information obtained from: facility chart records, patient report and [facility] chart review. Today's concern is:    She is feeling much fatigue with recent increases to Imdur (30 mg to 45 mg) and metoprolol 12.5 mg po BID to 25 mg po BID. Per MD notes history of diastolic heart failure. Last echocardiogram 2020 revealed LV ejection fraction of 51%, mild aortic valve regurgitation, mild mitral valve regurgitation, moderate to severe tricuspid valve regurgitation. He relates suspicious for acute decompensation of chronic congestive heart failure in the setting of severe hypertension and that control of HTN will be paramount with goal SBP at 120-130 with move to either beta-blocker or long-acting nitrates to achieve.     Currently BPs taken TID some meeting goal and others elevated but did see overall improvement with increases:    127/66  168/78  156/64  124/61  154/70  138/73  125/67  168/70      Past Medical and Surgical History reviewed in Epic today.    MEDICATIONS:    Current Outpatient Medications   Medication Sig Dispense Refill     metoprolol tartrate (LOPRESSOR) 25 MG tablet Take 1 tablet (25 mg) by mouth 2 times daily Hold for SBP <100 or HR <60. Goal -130       albuterol (PROAIR HFA/PROVENTIL HFA/VENTOLIN HFA) 108 (90 Base) MCG/ACT inhaler Inhale 2 puffs into the lungs every 4 hours as needed for shortness of breath / dyspnea or wheezing       clopidogrel (PLAVIX) 75 MG tablet Take 75 mg by mouth daily       escitalopram (LEXAPRO)  "5 MG tablet Take 5 mg by mouth daily       ferrous fumarate 65 mg, [place]. FE,-Vitamin C 125 mg (VITRON C)  MG TABS tablet Take 1 tablet by mouth daily       fluticasone-salmeterol (ADVAIR) 500-50 MCG/DOSE inhaler Inhale 1 puff into the lungs every 12 hours for Asthma Rinse and spit after each use.       hydrochlorothiazide (HYDRODIURIL) 25 MG tablet Take 25 mg by mouth daily every other day for blood pressure and fluid managment       isosorbide mononitrate (IMDUR) 30 MG 24 hr tablet Take 1 tablet (30 mg) by mouth daily AND 0.5 tablets (15 mg) daily. 30 tablet 4     lidocaine (LIDODERM) 5 % patch Place 1 patch onto the skin daily as needed for moderate pain To prevent lidocaine toxicity, patient should be patch free for 12 hrs daily.       losartan (COZAAR) 50 MG tablet Take 1.5 tablets (75 mg) by mouth daily       nitroGLYcerin (NITROSTAT) 0.4 MG sublingual tablet Place 0.4 mg under the tongue every 5 minutes as needed for chest pain For chest pain place 1 tablet under the tongue every 5 minutes for 3 doses. If symptoms persist 5 minutes after 1st dose call 911.       nystatin (MYCOSTATIN) 966682 UNIT/GM external powder Apply topically as needed       pantoprazole (PROTONIX) 40 MG EC tablet Take 40 mg by mouth daily for Esophagitis Before breakfast       rosuvastatin (CRESTOR) 5 MG tablet Take 5 mg by mouth every other day At Bedtime       SENNA-docusate sodium (SENNA S) 8.6-50 MG tablet Take 1 tablet by mouth 2 times daily as needed       Vitamin D, Cholecalciferol, 25 MCG (1000 UT) TABS Take 1 tablet by mouth daily       zolpidem (AMBIEN) 5 MG tablet Take 0.5 tablets (2.5 mg) by mouth nightly as needed for sleep 180 tablet 0     REVIEW OF SYSTEMS:  4 point ROS including Respiratory, CV, GI and , other than that noted in the HPI,  is negative    Objective:  BP (!) 144/72   Pulse 68   Temp 97.7  F (36.5  C)   Resp 18   Ht 1.549 m (5' 1\")   Wt 62.4 kg (137 lb 8 oz)   SpO2 97%   BMI 25.98 kg/m  "   Exam:  GENERAL APPEARANCE:  Alert, in no distress,laying in bed  ENT:  Mouth and posterior oropharynx normal, dry mucous membranes  EYES:  EOM, conjunctivae of left eye with subconjunctival hemorrhage,  right eye esotropia  RESP:  respiratory effort and palpation of chest normal, lungs clear but diminished   CV:  Palpation and auscultation of heart done , regular rate and rhythm, 3/6 murmur, trace edema-no compression  ABDOMEN:  no guarding or rebound  M/S:   kyphotic stance, some generalized weakness   SKIN:  intact to visualized areas-lesion on forehead is raised, dry and flaky, left nasal lesion, great toenail of right foot with darken  NEURO:   Cranial nerves 2-12 are normal tested and grossly at patient's baseline  PSYCH:  normal insight, judgement and memory    Labs:   BMP and CBC from 4/30/21 reviewed in [facility]    ASSESSMENT/PLAN:  (I11.0) Hypertensive heart disease with heart failure (H)  (primary encounter diagnosis)  Comment: some improvement in overall BPs with increase to metoprolol and Imdur but she would like to hold on any additional changes  Plan:   -isosorbide mononitrate (IMDUR) ER from 30 mg po daily to 45 mg po daily   -metoprolol 25 mg po BID  -losartan 75 mg po daily  -HCTZ 25 mg po every other day  -HR/BP TID for now with weekly weights   -nitroglycerin prn          Electronically signed by:  AZAEL Martínez CNP                 Sincerely,        AZAEL Martínez CNP

## 2021-05-10 NOTE — PROGRESS NOTES
[place] [facility]  [place] Medical Record Number:  [id number]  Place of Service where encounter took place:  [facility] () [[id number]]  Chief Complaint   Patient presents with     Nursing Home Acute       HPI:    Ermelinda Pfeiffer  is a 89 year old (10/19/1930), who is being seen today for an episodic care visit.  HPI information obtained from: facility chart records, facility staff, patient report and [facility] chart review. Today's concern is:    She is feeling much fatigue with recent increases to Imdur (30 mg to 45 mg) and metoprolol 12.5 mg po BID to 25 mg po BID. Per MD notes history of diastolic heart failure. Last echocardiogram 2020 revealed LV ejection fraction of 51%, mild aortic valve regurgitation, mild mitral valve regurgitation, moderate to severe tricuspid valve regurgitation. He relates suspicious for acute decompensation of chronic congestive heart failure in the setting of severe hypertension and that control of HTN will be paramount with goal SBP at 120-130 with move to either beta-blocker or long-acting nitrates to achieve.     Did review with Dr. Ortega that she is having intolerance to current doses. Plan family visit today for discussion. Dr. Ortega is agreeable to increase Losartan and decrease BB back to 12.5 mg po BID and monitor. BP average is 140/70 HR 72-78. Weights stale at 142 lbs. Meeting today with her and her daughter and they both agree issues with fatigue have improved. Was out to [facility] yesterday, going outside for wheelchair walks. Up with SBA to bathroom no s/s of lightheadedness, dizziness . SOB.     Also conference to review current code status and options. Resident is Full Code and family/resident would like to discuss with provider present different options. Resident BIMS score is 14 (cognitively intact) and is able to make her needs known. She seems conflicted about wanting intubation.     Past Medical and Surgical History reviewed in Epic  today.    MEDICATIONS:    Current Outpatient Medications   Medication Sig Dispense Refill     albuterol (PROAIR HFA/PROVENTIL HFA/VENTOLIN HFA) 108 (90 Base) MCG/ACT inhaler Inhale 2 puffs into the lungs every 4 hours as needed for shortness of breath / dyspnea or wheezing       clopidogrel (PLAVIX) 75 MG tablet Take 75 mg by mouth daily       escitalopram (LEXAPRO) 5 MG tablet Take 5 mg by mouth daily       ferrous fumarate 65 mg, [place]. FE,-Vitamin C 125 mg (VITRON C)  MG TABS tablet Take 1 tablet by mouth daily       fluticasone-salmeterol (ADVAIR) 500-50 MCG/DOSE inhaler Inhale 1 puff into the lungs every 12 hours for Asthma Rinse and spit after each use.       hydrochlorothiazide (HYDRODIURIL) 25 MG tablet Take 25 mg by mouth daily every other day for blood pressure and fluid managment       isosorbide mononitrate (IMDUR) 30 MG 24 hr tablet Take 1 tablet (30 mg) by mouth daily AND 0.5 tablets (15 mg) daily. 30 tablet 4     lidocaine (LIDODERM) 5 % patch Place 1 patch onto the skin daily as needed for moderate pain To prevent lidocaine toxicity, patient should be patch free for 12 hrs daily.       losartan (COZAAR) 50 MG tablet Take 1.5 tablets (75 mg) by mouth daily       metoprolol tartrate (LOPRESSOR) 25 MG tablet Take 1 tablet (25 mg) by mouth 2 times daily Hold for SBP <100 or HR <60. Goal -130       nitroGLYcerin (NITROSTAT) 0.4 MG sublingual tablet Place 0.4 mg under the tongue every 5 minutes as needed for chest pain For chest pain place 1 tablet under the tongue every 5 minutes for 3 doses. If symptoms persist 5 minutes after 1st dose call 911.       nystatin (MYCOSTATIN) 478166 UNIT/GM external powder Apply topically as needed       pantoprazole (PROTONIX) 40 MG EC tablet Take 40 mg by mouth daily for Esophagitis Before breakfast       rosuvastatin (CRESTOR) 5 MG tablet Take 5 mg by mouth every other day At Bedtime       SENNA-docusate sodium (SENNA S) 8.6-50 MG tablet Take 1 tablet by  "mouth 2 times daily as needed       Vitamin D, Cholecalciferol, 25 MCG (1000 UT) TABS Take 1 tablet by mouth daily       zolpidem (AMBIEN) 5 MG tablet Take 0.5 tablets (2.5 mg) by mouth nightly as needed for sleep 180 tablet 0     REVIEW OF SYSTEMS:  4 point ROS including Respiratory, CV, GI and , other than that noted in the HPI,  is negative    Objective:  /74   Pulse 78   Temp 97.3  F (36.3  C)   Resp 18   Ht 1.549 m (5' 1\")   Wt 64.4 kg (142 lb)   SpO2 95%   BMI 26.83 kg/m    Exam:  GENERAL APPEARANCE:  Alert, in no distress  ENT:  Mouth and posterior oropharynx normal, dry mucous membranes  EYES:  EOM, conjunctivae of left eye with subconjunctival hemorrhage improving  right eye esotropia  RESP:  no s/s of respiratory distress  CV:  trace edema-no compression  ABDOMEN:  no guarding or rebound  M/S:   kyphotic stance, some generalized weakness but at baseline   SKIN:  intact to visualized areas-lesion on forehead is raised, dry and flaky, left nasal lesion, great toenail of right foot with darken  NEURO:   Cranial nerves 2-12 are normal tested and grossly at patient's baseline  PSYCH:  normal insight, judgement and memory    Labs:   CBC RESULTS:   Recent Labs   Lab Test 04/11/21 08/05/20   WBC 7.4 7.7   RBC 4.09 3.87   HGB 12.3 11.2*   HCT 39.0 37.6   MCV 95 97   MCH 30.1 28.9   MCHC 31.5* 29.8*   RDW 13.0 13.4    363       Last Basic Metabolic Panel:  Recent Labs   Lab Test 04/11/21 08/05/20    139   POTASSIUM 3.8 3.8   CHLORIDE 106 104   NELSON 9.1 8.7   CO2 27 28   BUN 21 18   CR 0.83 0.78   * 89       Liver Function Studies - No results for input(s): PROTTOTAL, ALBUMIN, BILITOTAL, ALKPHOS, AST, ALT, BILIDIRECT in the last 20459 hours.    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) Hypertensive heart disease with heart failure (H)  (primary encounter diagnosis)  (I10) Essential hypertension   Comment: some improvement in overall BPs with " increase to metoprolol and Imdur but she would like to hold on any additional changes  Plan:   -isosorbide mononitrate (IMDUR) ER from 30 mg po daily to 45 mg po daily   -metoprolol 25 mg po BID  -losartan 75 mg po daily increased today to 100 mg po daily   -HCTZ 25 mg po every other day  -HR/BP TID for now with weekly weights   -nitroglycerin prn      (Z71.89) ACP (advanced care planning)  Comment: ongoing  Plan:  -reviewed POLST form, goal of care, what happens in code with daughter and resident. Currently is Full Code and will continue this code status for now.     Advance Care Planning Goals of Care Discussion 5/10/2021  In addition to the visit I spent 45 minutes in discussion of ACP with review of POLST (top to bottom of document). The total time with patient and family was 60 minutes. Start time: 12:00pm and End time 1:00 pm.               Electronically signed by:  AZAEL Martínez CNP

## 2021-05-10 NOTE — LETTER
5/10/2021        RE: Ermelinda Pfeiffer  [address] [address]        [place] [facility]  [place] Medical Record Number:  [id number]  Place of Service where encounter took place:  [facility] () [[id number]]  Chief Complaint   Patient presents with     Nursing Home Acute       HPI:    Ermelinda Pfeiffer  is a 89 year old (10/19/1930), who is being seen today for an episodic care visit.  HPI information obtained from: facility chart records, facility staff, patient report and [facility] chart review. Today's concern is:    She is feeling much fatigue with recent increases to Imdur (30 mg to 45 mg) and metoprolol 12.5 mg po BID to 25 mg po BID. Per MD notes history of diastolic heart failure. Last echocardiogram 2020 revealed LV ejection fraction of 51%, mild aortic valve regurgitation, mild mitral valve regurgitation, moderate to severe tricuspid valve regurgitation. He relates suspicious for acute decompensation of chronic congestive heart failure in the setting of severe hypertension and that control of HTN will be paramount with goal SBP at 120-130 with move to either beta-blocker or long-acting nitrates to achieve.     Did review with Dr. Ortega that she is having intolerance to current doses. Plan family visit today for discussion. Dr. Ortega is agreeable to increase Losartan and decrease BB back to 12.5 mg po BID and monitor. BP average is 140/70 HR 72-78. Weights stale at 142 lbs. Meeting today with her and her daughter and they both agree issues with fatigue have improved. Was out to [facility] yesterday, going outside for wheelchair walks. Up with SBA to bathroom no s/s of lightheadedness, dizziness . SOB.     Also conference to review current code status and options. Resident is Full Code and family/resident would like to discuss with provider present different options. Resident BIMS score is 14 (cognitively intact) and is able to  make her needs known. She seems conflicted about wanting intubation.     Past Medical and Surgical History reviewed in Epic today.    MEDICATIONS:    Current Outpatient Medications   Medication Sig Dispense Refill     albuterol (PROAIR HFA/PROVENTIL HFA/VENTOLIN HFA) 108 (90 Base) MCG/ACT inhaler Inhale 2 puffs into the lungs every 4 hours as needed for shortness of breath / dyspnea or wheezing       clopidogrel (PLAVIX) 75 MG tablet Take 75 mg by mouth daily       escitalopram (LEXAPRO) 5 MG tablet Take 5 mg by mouth daily       ferrous fumarate 65 mg, [place]. FE,-Vitamin C 125 mg (VITRON C)  MG TABS tablet Take 1 tablet by mouth daily       fluticasone-salmeterol (ADVAIR) 500-50 MCG/DOSE inhaler Inhale 1 puff into the lungs every 12 hours for Asthma Rinse and spit after each use.       hydrochlorothiazide (HYDRODIURIL) 25 MG tablet Take 25 mg by mouth daily every other day for blood pressure and fluid managment       isosorbide mononitrate (IMDUR) 30 MG 24 hr tablet Take 1 tablet (30 mg) by mouth daily AND 0.5 tablets (15 mg) daily. 30 tablet 4     lidocaine (LIDODERM) 5 % patch Place 1 patch onto the skin daily as needed for moderate pain To prevent lidocaine toxicity, patient should be patch free for 12 hrs daily.       losartan (COZAAR) 50 MG tablet Take 1.5 tablets (75 mg) by mouth daily       metoprolol tartrate (LOPRESSOR) 25 MG tablet Take 1 tablet (25 mg) by mouth 2 times daily Hold for SBP <100 or HR <60. Goal -130       nitroGLYcerin (NITROSTAT) 0.4 MG sublingual tablet Place 0.4 mg under the tongue every 5 minutes as needed for chest pain For chest pain place 1 tablet under the tongue every 5 minutes for 3 doses. If symptoms persist 5 minutes after 1st dose call 911.       nystatin (MYCOSTATIN) 569270 UNIT/GM external powder Apply topically as needed       pantoprazole (PROTONIX) 40 MG EC tablet Take 40 mg by mouth daily for Esophagitis Before breakfast       rosuvastatin (CRESTOR) 5 MG  "tablet Take 5 mg by mouth every other day At Bedtime       SENNA-docusate sodium (SENNA S) 8.6-50 MG tablet Take 1 tablet by mouth 2 times daily as needed       Vitamin D, Cholecalciferol, 25 MCG (1000 UT) TABS Take 1 tablet by mouth daily       zolpidem (AMBIEN) 5 MG tablet Take 0.5 tablets (2.5 mg) by mouth nightly as needed for sleep 180 tablet 0     REVIEW OF SYSTEMS:  4 point ROS including Respiratory, CV, GI and , other than that noted in the HPI,  is negative    Objective:  /74   Pulse 78   Temp 97.3  F (36.3  C)   Resp 18   Ht 1.549 m (5' 1\")   Wt 64.4 kg (142 lb)   SpO2 95%   BMI 26.83 kg/m    Exam:  GENERAL APPEARANCE:  Alert, in no distress  ENT:  Mouth and posterior oropharynx normal, dry mucous membranes  EYES:  EOM, conjunctivae of left eye with subconjunctival hemorrhage improving  right eye esotropia  RESP:  no s/s of respiratory distress  CV:  trace edema-no compression  ABDOMEN:  no guarding or rebound  M/S:   kyphotic stance, some generalized weakness but at baseline   SKIN:  intact to visualized areas-lesion on forehead is raised, dry and flaky, left nasal lesion, great toenail of right foot with darken  NEURO:   Cranial nerves 2-12 are normal tested and grossly at patient's baseline  PSYCH:  normal insight, judgement and memory    Labs:   CBC RESULTS:   Recent Labs   Lab Test 04/11/21 08/05/20   WBC 7.4 7.7   RBC 4.09 3.87   HGB 12.3 11.2*   HCT 39.0 37.6   MCV 95 97   MCH 30.1 28.9   MCHC 31.5* 29.8*   RDW 13.0 13.4    363       Last Basic Metabolic Panel:  Recent Labs   Lab Test 04/11/21 08/05/20    139   POTASSIUM 3.8 3.8   CHLORIDE 106 104   NELSON 9.1 8.7   CO2 27 28   BUN 21 18   CR 0.83 0.78   * 89       Liver Function Studies - No results for input(s): PROTTOTAL, ALBUMIN, BILITOTAL, ALKPHOS, AST, ALT, BILIDIRECT in the last 87266 hours.    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) Hypertensive heart disease with " heart failure (H)  (primary encounter diagnosis)  (I10) Essential hypertension   Comment: some improvement in overall BPs with increase to metoprolol and Imdur but she would like to hold on any additional changes  Plan:   -isosorbide mononitrate (IMDUR) ER from 30 mg po daily to 45 mg po daily   -metoprolol 25 mg po BID  -losartan 75 mg po daily increased today to 100 mg po daily   -HCTZ 25 mg po every other day  -HR/BP TID for now with weekly weights   -nitroglycerin prn      (Z71.89) ACP (advanced care planning)  Comment: ongoing  Plan:  -reviewed POLST form, goal of care, what happens in code with daughter and resident. Currently is Full Code and will continue this code status for now.     Advance Care Planning Goals of Care Discussion 5/10/2021  In addition to the visit I spent 45 minutes in discussion of ACP with review of POLST (top to bottom of document). The total time with patient and family was 60 minutes. Start time: 12:00pm and End time 1:00 pm.               Electronically signed by:  AZAEL Martínez CNP                 Sincerely,        AZAEL Martínez CNP

## 2021-05-20 NOTE — PROGRESS NOTES
[place] [facility]  [place] Medical Record Number:  [id number]  Place of Service where encounter took place:  [facility] () [[id number]]  Chief Complaint   Patient presents with     Nursing Home Acute       HPI:    Ermelinda Pfeiffer  is a 89 year old (10/19/1930), who is being seen today for an episodic care visit.  HPI information obtained from: facility chart records and patient report. Today's concern is:    Seen today for review of updated code status that resident and family have been working on and review of BP and HR. Current average /77 HR 73. Per MD notes history of diastolic heart failure. Last echocardiogram 2020 revealed LV ejection fraction of 51%, mild aortic valve regurgitation, mild mitral valve regurgitation, moderate to severe tricuspid valve regurgitation. He relates suspicious for recent acute decompensation of chronic congestive heart failure (needing hospitalization) in the setting of severe hypertension and that control of HTN will be paramount with goal SBP at 120-130. She was much fatigued with recent increases to Imdur (30 mg to 45 mg) and metoprolol 12.5 mg po BID to 25 mg po BID. Additionally losartan from 75 to 100 mg po daily but is tolerating slightly better.     Up in her room today self ambulating with walker. Sounds congested, some expiratory wheeze, loose cough. Says she is a bit more SOB.    Past Medical and Surgical History reviewed in Epic today.    MEDICATIONS:    Current Outpatient Medications   Medication Sig Dispense Refill     albuterol (PROAIR HFA/PROVENTIL HFA/VENTOLIN HFA) 108 (90 Base) MCG/ACT inhaler Inhale 2 puffs into the lungs every 4 hours as needed for shortness of breath / dyspnea or wheezing       clopidogrel (PLAVIX) 75 MG tablet Take 75 mg by mouth daily       escitalopram (LEXAPRO) 5 MG tablet Take 5 mg by mouth daily       ferrous fumarate 65 mg, [place]. FE,-Vitamin C 125 mg (VITRON C)  MG TABS tablet Take 1 tablet by mouth  "daily       fluticasone-salmeterol (ADVAIR) 500-50 MCG/DOSE inhaler Inhale 1 puff into the lungs every 12 hours for Asthma Rinse and spit after each use.       hydrochlorothiazide (HYDRODIURIL) 25 MG tablet Take 25 mg by mouth daily every other day for blood pressure and fluid managment       isosorbide mononitrate (IMDUR) 30 MG 24 hr tablet Take 1 tablet (30 mg) by mouth daily AND 0.5 tablets (15 mg) daily. 30 tablet 4     lidocaine (LIDODERM) 5 % patch Place 1 patch onto the skin daily as needed for moderate pain To prevent lidocaine toxicity, patient should be patch free for 12 hrs daily.       losartan (COZAAR) 50 MG tablet Take 2 tablets (100 mg) by mouth daily 30 tablet 1     metoprolol tartrate (LOPRESSOR) 25 MG tablet Take 1 tablet (25 mg) by mouth 2 times daily Hold for SBP <100 or HR <60. Goal -130       nitroGLYcerin (NITROSTAT) 0.4 MG sublingual tablet Place 0.4 mg under the tongue every 5 minutes as needed for chest pain For chest pain place 1 tablet under the tongue every 5 minutes for 3 doses. If symptoms persist 5 minutes after 1st dose call 911.       nystatin (MYCOSTATIN) 359165 UNIT/GM external powder Apply topically as needed       pantoprazole (PROTONIX) 40 MG EC tablet Take 40 mg by mouth daily for Esophagitis Before breakfast       rosuvastatin (CRESTOR) 5 MG tablet Take 5 mg by mouth every other day At Bedtime       SENNA-docusate sodium (SENNA S) 8.6-50 MG tablet Take 1 tablet by mouth 2 times daily as needed       Vitamin D, Cholecalciferol, 25 MCG (1000 UT) TABS Take 1 tablet by mouth daily       zolpidem (AMBIEN) 5 MG tablet Take 0.5 tablets (2.5 mg) by mouth nightly as needed for sleep 180 tablet 0     REVIEW OF SYSTEMS:  4 point ROS including Respiratory, CV, GI and , other than that noted in the HPI,  is negative    Objective:  BP (!) 167/79   Pulse 76   Temp 97.7  F (36.5  C)   Resp 18   Ht 1.549 m (5' 1\")   Wt 65.5 kg (144 lb 8 oz)   SpO2 97%   BMI 27.30 kg/m  "   Exam:  GENERAL APPEARANCE:  Alert, in no distress  ENT:  Mouth and posterior oropharynx normal, dry mucous membranes  EYES:  EOM, conjunctivae WNL,  right eye esotropia  RESP: loose wet congested cough, expiratory wheezes  CV:  trace edema-no compression  ABDOMEN:  no guarding or rebound  M/S:   kyphotic stance, some generalized weakness but at baseline   SKIN:  intact to visualized areas-lesion on forehead is raised, dry and flaky, left nasal lesion, great toenail of right foot with darken  NEURO:   Cranial nerves 2-12 are normal tested and grossly at patient's baseline  PSYCH:  normal insight, judgement and memory    Labs:   Recent labs in EPIC reviewed by me today.     ASSESSMENT/PLAN:  (J45.40) Moderate persistent asthma without complication  (R06.2) Wheeze  Comment: increased SOB today noted on exam   Plan:   -Proair 2 puff q4H prn   -Advair 1 puff every 12 hours prn   -CXR  -Duonebs BID and BID prn x 7 days for SOB  -BMP and CBC w/diff on 5/24/21    (I11.0) Hypertensive heart disease with heart failure (H)    (I10) Essential hypertension   Comment: some improvement in overall BPs with increase to metoprolol and Imdur and losartan   Plan:   -isosorbide mononitrate (IMDUR) ER from 30 mg po daily to 45 mg po daily   -metoprolol 25 mg po BID  -losartan 75 mg po daily increased to 100 mg po daily   -HCTZ 25 mg po every other day  -HR/BP TID for now with weekly weights   -nitroglycerin prn    (Z71.89) ACP (advance care planning)  Comment: reviewed today  Plan:  -updated today to DNR. Facility and [facility] with updated form           Electronically signed by:  AZAEL Martínez CNP

## 2021-05-21 NOTE — LETTER
5/21/2021        RE: Ermelinda Pfeiffer  [address] [address]        [place] [facility]  [place] Medical Record Number:  [id number]  Place of Service where encounter took place:  [facility] () [[id number]]  Chief Complaint   Patient presents with     Nursing Home Acute       HPI:    Ermelinda Pfeiffer  is a 89 year old (10/19/1930), who is being seen today for an episodic care visit.  HPI information obtained from: facility chart records and patient report. Today's concern is:    Seen today for review of updated code status that resident and family have been working on and review of BP and HR. Current average /77 HR 73. Per MD notes history of diastolic heart failure. Last echocardiogram 2020 revealed LV ejection fraction of 51%, mild aortic valve regurgitation, mild mitral valve regurgitation, moderate to severe tricuspid valve regurgitation. He relates suspicious for recent acute decompensation of chronic congestive heart failure (needing hospitalization) in the setting of severe hypertension and that control of HTN will be paramount with goal SBP at 120-130. She was much fatigue with with recent increases to Imdur (30 mg to 45 mg) and metoprolol 12.5 mg po BID to 25 mg po BID. Additionally losartan from 75 to 100 mg po daily but is tolerating slightly better.     Past Medical and Surgical History reviewed in Epic today.    MEDICATIONS:    Current Outpatient Medications   Medication Sig Dispense Refill     albuterol (PROAIR HFA/PROVENTIL HFA/VENTOLIN HFA) 108 (90 Base) MCG/ACT inhaler Inhale 2 puffs into the lungs every 4 hours as needed for shortness of breath / dyspnea or wheezing       clopidogrel (PLAVIX) 75 MG tablet Take 75 mg by mouth daily       escitalopram (LEXAPRO) 5 MG tablet Take 5 mg by mouth daily       ferrous fumarate 65 mg, [place]. FE,-Vitamin C 125 mg (VITRON C)  MG TABS tablet Take 1 tablet by mouth daily  "      fluticasone-salmeterol (ADVAIR) 500-50 MCG/DOSE inhaler Inhale 1 puff into the lungs every 12 hours for Asthma Rinse and spit after each use.       hydrochlorothiazide (HYDRODIURIL) 25 MG tablet Take 25 mg by mouth daily every other day for blood pressure and fluid managment       isosorbide mononitrate (IMDUR) 30 MG 24 hr tablet Take 1 tablet (30 mg) by mouth daily AND 0.5 tablets (15 mg) daily. 30 tablet 4     lidocaine (LIDODERM) 5 % patch Place 1 patch onto the skin daily as needed for moderate pain To prevent lidocaine toxicity, patient should be patch free for 12 hrs daily.       losartan (COZAAR) 50 MG tablet Take 2 tablets (100 mg) by mouth daily 30 tablet 1     metoprolol tartrate (LOPRESSOR) 25 MG tablet Take 1 tablet (25 mg) by mouth 2 times daily Hold for SBP <100 or HR <60. Goal -130       nitroGLYcerin (NITROSTAT) 0.4 MG sublingual tablet Place 0.4 mg under the tongue every 5 minutes as needed for chest pain For chest pain place 1 tablet under the tongue every 5 minutes for 3 doses. If symptoms persist 5 minutes after 1st dose call 911.       nystatin (MYCOSTATIN) 117379 UNIT/GM external powder Apply topically as needed       pantoprazole (PROTONIX) 40 MG EC tablet Take 40 mg by mouth daily for Esophagitis Before breakfast       rosuvastatin (CRESTOR) 5 MG tablet Take 5 mg by mouth every other day At Bedtime       SENNA-docusate sodium (SENNA S) 8.6-50 MG tablet Take 1 tablet by mouth 2 times daily as needed       Vitamin D, Cholecalciferol, 25 MCG (1000 UT) TABS Take 1 tablet by mouth daily       zolpidem (AMBIEN) 5 MG tablet Take 0.5 tablets (2.5 mg) by mouth nightly as needed for sleep 180 tablet 0     REVIEW OF SYSTEMS:  4 point ROS including Respiratory, CV, GI and , other than that noted in the HPI,  is negative    Objective:  BP (!) 167/79   Pulse 76   Temp 97.7  F (36.5  C)   Resp 18   Ht 1.549 m (5' 1\")   Wt 65.5 kg (144 lb 8 oz)   SpO2 97%   BMI 27.30 kg/m  "   Exam:  GENERAL APPEARANCE:  Alert, in no distress  ENT:  Mouth and posterior oropharynx normal, dry mucous membranes  EYES:  EOM, conjunctivae of left eye with subconjunctival hemorrhage improving  right eye esotropia  RESP:  no s/s of respiratory distress  CV:  trace edema-no compression  ABDOMEN:  no guarding or rebound  M/S:   kyphotic stance, some generalized weakness but at baseline   SKIN:  intact to visualized areas-lesion on forehead is raised, dry and flaky, left nasal lesion, great toenail of right foot with darken  NEURO:   Cranial nerves 2-12 are normal tested and grossly at patient's baseline  PSYCH:  normal insight, judgement and memory    Labs:   Recent labs in [facility] reviewed by me today.     ASSESSMENT/PLAN:  {FGS DX:[id number]}      Electronically signed by:  AZAEL Martínez CNP   {Providers Please double check the med list (in the plan section >> meds & orders tab) and Discontinue any of the meds flagged by the TC to be discontinued}          [facility]  [place] Medical Record Number:  [id number]  Place of Service where encounter took place:  [facility] () [[id number]]  Chief Complaint   Patient presents with     Nursing Home Acute       HPI:    Ermelinda Pfeiffer  is a 89 year old (10/19/1930), who is being seen today for an episodic care visit.  HPI information obtained from: facility chart records and patient report. Today's concern is:    Seen today for review of updated code status that resident and family have been working on and review of BP and HR. Current average /77 HR 73. Per MD notes history of diastolic heart failure. Last echocardiogram 2020 revealed LV ejection fraction of 51%, mild aortic valve regurgitation, mild mitral valve regurgitation, moderate to severe tricuspid valve regurgitation. He relates suspicious for recent acute decompensation of chronic congestive heart failure (needing hospitalization) in the setting of severe hypertension  and that control of HTN will be paramount with goal SBP at 120-130. She was much fatigued with recent increases to Imdur (30 mg to 45 mg) and metoprolol 12.5 mg po BID to 25 mg po BID. Additionally losartan from 75 to 100 mg po daily but is tolerating slightly better.     Up in her room today self ambulating with walker. Sounds congested, some expiratory wheeze, loose cough. Says she is a bit more SOB.    Past Medical and Surgical History reviewed in Epic today.    MEDICATIONS:    Current Outpatient Medications   Medication Sig Dispense Refill     albuterol (PROAIR HFA/PROVENTIL HFA/VENTOLIN HFA) 108 (90 Base) MCG/ACT inhaler Inhale 2 puffs into the lungs every 4 hours as needed for shortness of breath / dyspnea or wheezing       clopidogrel (PLAVIX) 75 MG tablet Take 75 mg by mouth daily       escitalopram (LEXAPRO) 5 MG tablet Take 5 mg by mouth daily       ferrous fumarate 65 mg, [place]. FE,-Vitamin C 125 mg (VITRON C)  MG TABS tablet Take 1 tablet by mouth daily       fluticasone-salmeterol (ADVAIR) 500-50 MCG/DOSE inhaler Inhale 1 puff into the lungs every 12 hours for Asthma Rinse and spit after each use.       hydrochlorothiazide (HYDRODIURIL) 25 MG tablet Take 25 mg by mouth daily every other day for blood pressure and fluid managment       isosorbide mononitrate (IMDUR) 30 MG 24 hr tablet Take 1 tablet (30 mg) by mouth daily AND 0.5 tablets (15 mg) daily. 30 tablet 4     lidocaine (LIDODERM) 5 % patch Place 1 patch onto the skin daily as needed for moderate pain To prevent lidocaine toxicity, patient should be patch free for 12 hrs daily.       losartan (COZAAR) 50 MG tablet Take 2 tablets (100 mg) by mouth daily 30 tablet 1     metoprolol tartrate (LOPRESSOR) 25 MG tablet Take 1 tablet (25 mg) by mouth 2 times daily Hold for SBP <100 or HR <60. Goal -130       nitroGLYcerin (NITROSTAT) 0.4 MG sublingual tablet Place 0.4 mg under the tongue every 5 minutes as needed for chest pain For chest pain  "place 1 tablet under the tongue every 5 minutes for 3 doses. If symptoms persist 5 minutes after 1st dose call 911.       nystatin (MYCOSTATIN) 814911 UNIT/GM external powder Apply topically as needed       pantoprazole (PROTONIX) 40 MG EC tablet Take 40 mg by mouth daily for Esophagitis Before breakfast       rosuvastatin (CRESTOR) 5 MG tablet Take 5 mg by mouth every other day At Bedtime       SENNA-docusate sodium (SENNA S) 8.6-50 MG tablet Take 1 tablet by mouth 2 times daily as needed       Vitamin D, Cholecalciferol, 25 MCG (1000 UT) TABS Take 1 tablet by mouth daily       zolpidem (AMBIEN) 5 MG tablet Take 0.5 tablets (2.5 mg) by mouth nightly as needed for sleep 180 tablet 0     REVIEW OF SYSTEMS:  4 point ROS including Respiratory, CV, GI and , other than that noted in the HPI,  is negative    Objective:  BP (!) 167/79   Pulse 76   Temp 97.7  F (36.5  C)   Resp 18   Ht 1.549 m (5' 1\")   Wt 65.5 kg (144 lb 8 oz)   SpO2 97%   BMI 27.30 kg/m    Exam:  GENERAL APPEARANCE:  Alert, in no distress  ENT:  Mouth and posterior oropharynx normal, dry mucous membranes  EYES:  EOM, conjunctivae WNL,  right eye esotropia  RESP: loose wet congested cough, expiratory wheezes  CV:  trace edema-no compression  ABDOMEN:  no guarding or rebound  M/S:   kyphotic stance, some generalized weakness but at baseline   SKIN:  intact to visualized areas-lesion on forehead is raised, dry and flaky, left nasal lesion, great toenail of right foot with darken  NEURO:   Cranial nerves 2-12 are normal tested and grossly at patient's baseline  PSYCH:  normal insight, judgement and memory    Labs:   Recent labs in EPIC reviewed by me today.     ASSESSMENT/PLAN:  (J45.40) Moderate persistent asthma without complication  (R06.2) Wheeze  Comment: increased SOB today noted on exam   Plan:   -Proair 2 puff q4H prn   -Advair 1 puff every 12 hours prn   -CXR  -Duonebs BID and BID prn x 7 days for SOB  -BMP and CBC w/diff on " 5/24/21    (I11.0) Hypertensive heart disease with heart failure (H)    (I10) Essential hypertension   Comment: some improvement in overall BPs with increase to metoprolol and Imdur and losartan   Plan:   -isosorbide mononitrate (IMDUR) ER from 30 mg po daily to 45 mg po daily   -metoprolol 25 mg po BID  -losartan 75 mg po daily increased to 100 mg po daily   -HCTZ 25 mg po every other day  -HR/BP TID for now with weekly weights   -nitroglycerin prn              Electronically signed by:  AZAEL Martínez CNP                 Sincerely,        AZAEL Martínez CNP

## 2021-05-21 NOTE — LETTER
5/21/2021        RE: Ermelinda Pfeiffer  [address] [address]        [place] [facility]  [place] Medical Record Number:  [id number]  Place of Service where encounter took place:  [facility] () [[id number]]  Chief Complaint   Patient presents with     Nursing Home Acute       HPI:    Ermelinda Pfeiffer  is a 89 year old (10/19/1930), who is being seen today for an episodic care visit.  HPI information obtained from: facility chart records and patient report. Today's concern is:    Seen today for review of updated code status that resident and family have been working on and review of BP and HR. Current average /77 HR 73. Per MD notes history of diastolic heart failure. Last echocardiogram 2020 revealed LV ejection fraction of 51%, mild aortic valve regurgitation, mild mitral valve regurgitation, moderate to severe tricuspid valve regurgitation. He relates suspicious for recent acute decompensation of chronic congestive heart failure (needing hospitalization) in the setting of severe hypertension and that control of HTN will be paramount with goal SBP at 120-130. She was much fatigue with with recent increases to Imdur (30 mg to 45 mg) and metoprolol 12.5 mg po BID to 25 mg po BID. Additionally losartan from 75 to 100 mg po daily but is tolerating slightly better.     Past Medical and Surgical History reviewed in Epic today.    MEDICATIONS:    Current Outpatient Medications   Medication Sig Dispense Refill     albuterol (PROAIR HFA/PROVENTIL HFA/VENTOLIN HFA) 108 (90 Base) MCG/ACT inhaler Inhale 2 puffs into the lungs every 4 hours as needed for shortness of breath / dyspnea or wheezing       clopidogrel (PLAVIX) 75 MG tablet Take 75 mg by mouth daily       escitalopram (LEXAPRO) 5 MG tablet Take 5 mg by mouth daily       ferrous fumarate 65 mg, [place]. FE,-Vitamin C 125 mg (VITRON C)  MG TABS tablet Take 1 tablet by mouth daily  "      fluticasone-salmeterol (ADVAIR) 500-50 MCG/DOSE inhaler Inhale 1 puff into the lungs every 12 hours for Asthma Rinse and spit after each use.       hydrochlorothiazide (HYDRODIURIL) 25 MG tablet Take 25 mg by mouth daily every other day for blood pressure and fluid managment       isosorbide mononitrate (IMDUR) 30 MG 24 hr tablet Take 1 tablet (30 mg) by mouth daily AND 0.5 tablets (15 mg) daily. 30 tablet 4     lidocaine (LIDODERM) 5 % patch Place 1 patch onto the skin daily as needed for moderate pain To prevent lidocaine toxicity, patient should be patch free for 12 hrs daily.       losartan (COZAAR) 50 MG tablet Take 2 tablets (100 mg) by mouth daily 30 tablet 1     metoprolol tartrate (LOPRESSOR) 25 MG tablet Take 1 tablet (25 mg) by mouth 2 times daily Hold for SBP <100 or HR <60. Goal -130       nitroGLYcerin (NITROSTAT) 0.4 MG sublingual tablet Place 0.4 mg under the tongue every 5 minutes as needed for chest pain For chest pain place 1 tablet under the tongue every 5 minutes for 3 doses. If symptoms persist 5 minutes after 1st dose call 911.       nystatin (MYCOSTATIN) 371494 UNIT/GM external powder Apply topically as needed       pantoprazole (PROTONIX) 40 MG EC tablet Take 40 mg by mouth daily for Esophagitis Before breakfast       rosuvastatin (CRESTOR) 5 MG tablet Take 5 mg by mouth every other day At Bedtime       SENNA-docusate sodium (SENNA S) 8.6-50 MG tablet Take 1 tablet by mouth 2 times daily as needed       Vitamin D, Cholecalciferol, 25 MCG (1000 UT) TABS Take 1 tablet by mouth daily       zolpidem (AMBIEN) 5 MG tablet Take 0.5 tablets (2.5 mg) by mouth nightly as needed for sleep 180 tablet 0     REVIEW OF SYSTEMS:  4 point ROS including Respiratory, CV, GI and , other than that noted in the HPI,  is negative    Objective:  BP (!) 167/79   Pulse 76   Temp 97.7  F (36.5  C)   Resp 18   Ht 1.549 m (5' 1\")   Wt 65.5 kg (144 lb 8 oz)   SpO2 97%   BMI 27.30 kg/m  "   Exam:  GENERAL APPEARANCE:  Alert, in no distress  ENT:  Mouth and posterior oropharynx normal, dry mucous membranes  EYES:  EOM, conjunctivae of left eye with subconjunctival hemorrhage improving  right eye esotropia  RESP:  no s/s of respiratory distress  CV:  trace edema-no compression  ABDOMEN:  no guarding or rebound  M/S:   kyphotic stance, some generalized weakness but at baseline   SKIN:  intact to visualized areas-lesion on forehead is raised, dry and flaky, left nasal lesion, great toenail of right foot with darken  NEURO:   Cranial nerves 2-12 are normal tested and grossly at patient's baseline  PSYCH:  normal insight, judgement and memory    Labs:   Recent labs in [facility] reviewed by me today.     ASSESSMENT/PLAN:  {FGS DX:[id number]}      Electronically signed by:  AZAEL Martínez CNP   {Providers Please double check the med list (in the plan section >> meds & orders tab) and Discontinue any of the meds flagged by the TC to be discontinued}          [facility]  [place] Medical Record Number:  [id number]  Place of Service where encounter took place:  [facility] () [[id number]]  Chief Complaint   Patient presents with     Nursing Home Acute       HPI:    Ermelinda Pfeiffer  is a 89 year old (10/19/1930), who is being seen today for an episodic care visit.  HPI information obtained from: facility chart records and patient report. Today's concern is:    Seen today for review of updated code status that resident and family have been working on and review of BP and HR. Current average /77 HR 73. Per MD notes history of diastolic heart failure. Last echocardiogram 2020 revealed LV ejection fraction of 51%, mild aortic valve regurgitation, mild mitral valve regurgitation, moderate to severe tricuspid valve regurgitation. He relates suspicious for recent acute decompensation of chronic congestive heart failure (needing hospitalization) in the setting of severe hypertension  and that control of HTN will be paramount with goal SBP at 120-130. She was much fatigued with recent increases to Imdur (30 mg to 45 mg) and metoprolol 12.5 mg po BID to 25 mg po BID. Additionally losartan from 75 to 100 mg po daily but is tolerating slightly better.     Up in her room today self ambulating with walker. Sounds congested, some expiratory wheeze, loose cough. Says she is a bit more SOB.    Past Medical and Surgical History reviewed in Epic today.    MEDICATIONS:    Current Outpatient Medications   Medication Sig Dispense Refill     albuterol (PROAIR HFA/PROVENTIL HFA/VENTOLIN HFA) 108 (90 Base) MCG/ACT inhaler Inhale 2 puffs into the lungs every 4 hours as needed for shortness of breath / dyspnea or wheezing       clopidogrel (PLAVIX) 75 MG tablet Take 75 mg by mouth daily       escitalopram (LEXAPRO) 5 MG tablet Take 5 mg by mouth daily       ferrous fumarate 65 mg, [place]. FE,-Vitamin C 125 mg (VITRON C)  MG TABS tablet Take 1 tablet by mouth daily       fluticasone-salmeterol (ADVAIR) 500-50 MCG/DOSE inhaler Inhale 1 puff into the lungs every 12 hours for Asthma Rinse and spit after each use.       hydrochlorothiazide (HYDRODIURIL) 25 MG tablet Take 25 mg by mouth daily every other day for blood pressure and fluid managment       isosorbide mononitrate (IMDUR) 30 MG 24 hr tablet Take 1 tablet (30 mg) by mouth daily AND 0.5 tablets (15 mg) daily. 30 tablet 4     lidocaine (LIDODERM) 5 % patch Place 1 patch onto the skin daily as needed for moderate pain To prevent lidocaine toxicity, patient should be patch free for 12 hrs daily.       losartan (COZAAR) 50 MG tablet Take 2 tablets (100 mg) by mouth daily 30 tablet 1     metoprolol tartrate (LOPRESSOR) 25 MG tablet Take 1 tablet (25 mg) by mouth 2 times daily Hold for SBP <100 or HR <60. Goal -130       nitroGLYcerin (NITROSTAT) 0.4 MG sublingual tablet Place 0.4 mg under the tongue every 5 minutes as needed for chest pain For chest pain  "place 1 tablet under the tongue every 5 minutes for 3 doses. If symptoms persist 5 minutes after 1st dose call 911.       nystatin (MYCOSTATIN) 705180 UNIT/GM external powder Apply topically as needed       pantoprazole (PROTONIX) 40 MG EC tablet Take 40 mg by mouth daily for Esophagitis Before breakfast       rosuvastatin (CRESTOR) 5 MG tablet Take 5 mg by mouth every other day At Bedtime       SENNA-docusate sodium (SENNA S) 8.6-50 MG tablet Take 1 tablet by mouth 2 times daily as needed       Vitamin D, Cholecalciferol, 25 MCG (1000 UT) TABS Take 1 tablet by mouth daily       zolpidem (AMBIEN) 5 MG tablet Take 0.5 tablets (2.5 mg) by mouth nightly as needed for sleep 180 tablet 0     REVIEW OF SYSTEMS:  4 point ROS including Respiratory, CV, GI and , other than that noted in the HPI,  is negative    Objective:  BP (!) 167/79   Pulse 76   Temp 97.7  F (36.5  C)   Resp 18   Ht 1.549 m (5' 1\")   Wt 65.5 kg (144 lb 8 oz)   SpO2 97%   BMI 27.30 kg/m    Exam:  GENERAL APPEARANCE:  Alert, in no distress  ENT:  Mouth and posterior oropharynx normal, dry mucous membranes  EYES:  EOM, conjunctivae WNL,  right eye esotropia  RESP: loose wet congested cough, expiratory wheezes  CV:  trace edema-no compression  ABDOMEN:  no guarding or rebound  M/S:   kyphotic stance, some generalized weakness but at baseline   SKIN:  intact to visualized areas-lesion on forehead is raised, dry and flaky, left nasal lesion, great toenail of right foot with darken  NEURO:   Cranial nerves 2-12 are normal tested and grossly at patient's baseline  PSYCH:  normal insight, judgement and memory    Labs:   Recent labs in EPIC reviewed by me today.     ASSESSMENT/PLAN:  (J45.40) Moderate persistent asthma without complication  (R06.2) Wheeze  Comment: increased SOB today noted on exam   Plan:   -Proair 2 puff q4H prn   -Advair 1 puff every 12 hours prn   -CXR  -Duonebs BID and BID prn x 7 days for SOB  -BMP and CBC w/diff on " 5/24/21    (I11.0) Hypertensive heart disease with heart failure (H)    (I10) Essential hypertension   Comment: some improvement in overall BPs with increase to metoprolol and Imdur and losartan   Plan:   -isosorbide mononitrate (IMDUR) ER from 30 mg po daily to 45 mg po daily   -metoprolol 25 mg po BID  -losartan 75 mg po daily increased to 100 mg po daily   -HCTZ 25 mg po every other day  -HR/BP TID for now with weekly weights   -nitroglycerin prn              Electronically signed by:  AZAEL Martínez CNP                 Sincerely,        AZAEL Martínez CNP

## 2021-05-21 NOTE — LETTER
5/21/2021        RE: Ermelinda Pfeiffer  [address] [address]        [facility]  [facility] Record Number:  [id number]  Place of Service where encounter took place:  [facility] () [[id number]]  Chief Complaint   Patient presents with     Nursing Home Acute       HPI:    Ermelinda Pfeiffer  is a 89 year old (10/19/1930), who is being seen today for an episodic care visit.  HPI information obtained from: facility chart records and patient report. Today's concern is:    Seen today for review of updated code status that resident and family have been working on and review of BP and HR. Current average /77 HR 73. Per MD notes history of diastolic heart failure. Last echocardiogram 2020 revealed LV ejection fraction of 51%, mild aortic valve regurgitation, mild mitral valve regurgitation, moderate to severe tricuspid valve regurgitation. He relates suspicious for recent acute decompensation of chronic congestive heart failure (needing hospitalization) in the setting of severe hypertension and that control of HTN will be paramount with goal SBP at 120-130. She was much fatigued with recent increases to Imdur (30 mg to 45 mg) and metoprolol 12.5 mg po BID to 25 mg po BID. Additionally losartan from 75 to 100 mg po daily but is tolerating slightly better.     Up in her room today self ambulating with walker. Sounds congested, some expiratory wheeze, loose cough. Says she is a bit more SOB.    Past Medical and Surgical History reviewed in Epic today.    MEDICATIONS:    Current Outpatient Medications   Medication Sig Dispense Refill     albuterol (PROAIR HFA/PROVENTIL HFA/VENTOLIN HFA) 108 (90 Base) MCG/ACT inhaler Inhale 2 puffs into the lungs every 4 hours as needed for shortness of breath / dyspnea or wheezing       clopidogrel (PLAVIX) 75 MG tablet Take 75 mg by mouth daily       escitalopram (LEXAPRO) 5 MG tablet Take 5 mg  by mouth daily       ferrous fumarate 65 mg, [place]. FE,-Vitamin C 125 mg (VITRON C)  MG TABS tablet Take 1 tablet by mouth daily       fluticasone-salmeterol (ADVAIR) 500-50 MCG/DOSE inhaler Inhale 1 puff into the lungs every 12 hours for Asthma Rinse and spit after each use.       hydrochlorothiazide (HYDRODIURIL) 25 MG tablet Take 25 mg by mouth daily every other day for blood pressure and fluid managment       isosorbide mononitrate (IMDUR) 30 MG 24 hr tablet Take 1 tablet (30 mg) by mouth daily AND 0.5 tablets (15 mg) daily. 30 tablet 4     lidocaine (LIDODERM) 5 % patch Place 1 patch onto the skin daily as needed for moderate pain To prevent lidocaine toxicity, patient should be patch free for 12 hrs daily.       losartan (COZAAR) 50 MG tablet Take 2 tablets (100 mg) by mouth daily 30 tablet 1     metoprolol tartrate (LOPRESSOR) 25 MG tablet Take 1 tablet (25 mg) by mouth 2 times daily Hold for SBP <100 or HR <60. Goal -130       nitroGLYcerin (NITROSTAT) 0.4 MG sublingual tablet Place 0.4 mg under the tongue every 5 minutes as needed for chest pain For chest pain place 1 tablet under the tongue every 5 minutes for 3 doses. If symptoms persist 5 minutes after 1st dose call 911.       nystatin (MYCOSTATIN) 720419 UNIT/GM external powder Apply topically as needed       pantoprazole (PROTONIX) 40 MG EC tablet Take 40 mg by mouth daily for Esophagitis Before breakfast       rosuvastatin (CRESTOR) 5 MG tablet Take 5 mg by mouth every other day At Bedtime       SENNA-docusate sodium (SENNA S) 8.6-50 MG tablet Take 1 tablet by mouth 2 times daily as needed       Vitamin D, Cholecalciferol, 25 MCG (1000 UT) TABS Take 1 tablet by mouth daily       zolpidem (AMBIEN) 5 MG tablet Take 0.5 tablets (2.5 mg) by mouth nightly as needed for sleep 180 tablet 0     REVIEW OF SYSTEMS:  4 point ROS including Respiratory, CV, GI and , other than that noted in the HPI,  is negative    Objective:  BP (!) 167/79   Pulse  "76   Temp 97.7  F (36.5  C)   Resp 18   Ht 1.549 m (5' 1\")   Wt 65.5 kg (144 lb 8 oz)   SpO2 97%   BMI 27.30 kg/m    Exam:  GENERAL APPEARANCE:  Alert, in no distress  ENT:  Mouth and posterior oropharynx normal, dry mucous membranes  EYES:  EOM, conjunctivae WNL,  right eye esotropia  RESP: loose wet congested cough, expiratory wheezes  CV:  trace edema-no compression  ABDOMEN:  no guarding or rebound  M/S:   kyphotic stance, some generalized weakness but at baseline   SKIN:  intact to visualized areas-lesion on forehead is raised, dry and flaky, left nasal lesion, great toenail of right foot with darken  NEURO:   Cranial nerves 2-12 are normal tested and grossly at patient's baseline  PSYCH:  normal insight, judgement and memory    Labs:   Recent labs in [facility] reviewed by me today.     ASSESSMENT/PLAN:  (J45.40) Moderate persistent asthma without complication  (R06.2) Wheeze  Comment: increased SOB today noted on exam   Plan:   -Proair 2 puff q4H prn   -Advair 1 puff every 12 hours prn   -CXR  -Duonebs BID and BID prn x 7 days for SOB  -BMP and CBC w/diff on 5/24/21    (I11.0) Hypertensive heart disease with heart failure (H)    (I10) Essential hypertension   Comment: some improvement in overall BPs with increase to metoprolol and Imdur and losartan   Plan:   -isosorbide mononitrate (IMDUR) ER from 30 mg po daily to 45 mg po daily   -metoprolol 25 mg po BID  -losartan 75 mg po daily increased to 100 mg po daily   -HCTZ 25 mg po every other day  -HR/BP TID for now with weekly weights   -nitroglycerin prn              Electronically signed by:  AZAEL Martínez CNP                 Sincerely,        AZAEL Martínez CNP    "

## 2021-06-11 NOTE — PROGRESS NOTES
[facility]  Chief Complaint   Patient presents with     care home Regulatory     Wellness Visit     [facility] Medical Record Number:  [id number]  Place of Service where encounter took place:  [facility] () [[id number]]    HPI:    Ermelinda Pfeiffer  is 89 year old (10/19/1930), who is being seen today for a federally mandated E/M visit.  HPI information obtained from: facility chart records, patient report and [facility] Epic chart review. Today's concerns are:    Seen today for follow up to chronic disease management. In her room taking a nap. Says her feet intermittently hurt (not tingle/burn) but doesn't want anything that could make her more tired or fatigued. Doesn't want tylenol. Really would like to move into her adult daughters home and we discuss again why this is not a good option for her or Rosalba. Appears to lack some awareness of her daily needs and skilled nursing facility is appropriate.  Treated last month for increased cough/wheeze/SOB and responded to duonebs. No respiratory s/s noted today but continues to have intermittent wheeze per staff. Denies pain, chest pain, abdominal pain.      ALLERGIES:Sulfamethoxazole-trimethoprim, Levofloxacin, Clarithromycin, Mirtazapine, and Penicillin g  PAST MEDICAL HISTORY:   has a past medical history of Acute sinus infection, Anemia, CAD (coronary artery disease), Cancer of the skin, basal cell, Cataract, Depressive disorder, Diplopia, Disturbance, sleep, GERD (gastroesophageal reflux disease), HTN (hypertension), Hyperlipidemia, Insomnia, Myocardial infarction (H), Osteoporosis, Skin cancer, Status post parathyroidectomy (05/23/2016), Stone, kidney, and Transient ischemic attack.  PAST SURGICAL HISTORY:   has a past surgical history that includes Parathyroidectomy (1969) and PERCUTANEOUS TRANSLUMINAL BALLOON ANGIOPLASTY WITH INSERTION OF STENT INTO CORONARY ARTERY N/A  (10/29/1997).  FAMILY HISTORY: family history includes  Suicide in her maternal aunt and maternal uncle.  SOCIAL HISTORY:  reports that she has quit smoking. She has a 3.25 pack-year smoking history. She has never used smokeless tobacco. She reports that she does not drink alcohol or use drugs.    MEDICATIONS:  Current Outpatient Medications   Medication Sig Dispense Refill     tiotropium (SPIRIVA RESPIMAT) 2.5 MCG/ACT inhaler Inhale 2 puffs into the lungs daily 4 g 11     albuterol (PROAIR HFA/PROVENTIL HFA/VENTOLIN HFA) 108 (90 Base) MCG/ACT inhaler Inhale 2 puffs into the lungs every 4 hours as needed for shortness of breath / dyspnea or wheezing       clopidogrel (PLAVIX) 75 MG tablet Take 75 mg by mouth daily       escitalopram (LEXAPRO) 5 MG tablet Take 5 mg by mouth daily       ferrous fumarate 65 mg, [place]. FE,-Vitamin C 125 mg (VITRON C)  MG TABS tablet Take 1 tablet by mouth daily       fluticasone-salmeterol (ADVAIR) 500-50 MCG/DOSE inhaler Inhale 1 puff into the lungs every 12 hours for Asthma Rinse and spit after each use.       hydrochlorothiazide (HYDRODIURIL) 25 MG tablet Take 25 mg by mouth daily every other day for blood pressure and fluid managment       isosorbide mononitrate (IMDUR) 30 MG 24 hr tablet Take 1 tablet (30 mg) by mouth daily AND 0.5 tablets (15 mg) daily. 30 tablet 4     lidocaine (LIDODERM) 5 % patch Place 1 patch onto the skin daily as needed for moderate pain To prevent lidocaine toxicity, patient should be patch free for 12 hrs daily.       losartan (COZAAR) 50 MG tablet Take 2 tablets (100 mg) by mouth daily 30 tablet 1     metoprolol tartrate (LOPRESSOR) 25 MG tablet Take 1 tablet (25 mg) by mouth 2 times daily Hold for SBP <100 or HR <60. Goal -130       nitroGLYcerin (NITROSTAT) 0.4 MG sublingual tablet Place 0.4 mg under the tongue every 5 minutes as needed for chest pain For chest pain place 1 tablet under the tongue every 5 minutes for 3 doses. If symptoms persist 5 minutes after 1st dose call 911.       nystatin  "(MYCOSTATIN) 771852 UNIT/GM external powder Apply topically as needed       pantoprazole (PROTONIX) 40 MG EC tablet Take 40 mg by mouth daily for Esophagitis Before breakfast       rosuvastatin (CRESTOR) 5 MG tablet Take 5 mg by mouth every other day At Bedtime       SENNA-docusate sodium (SENNA S) 8.6-50 MG tablet Take 1 tablet by mouth 2 times daily as needed       Vitamin D, Cholecalciferol, 25 MCG (1000 UT) TABS Take 1 tablet by mouth daily       zolpidem (AMBIEN) 5 MG tablet Take 0.5 tablets (2.5 mg) by mouth nightly as needed for sleep 180 tablet 0     Case Management:  I have reviewed the care plan and MDS and do agree with the plan. Patient's desire to return to the community is present, but is not able due to care needs . Information reviewed:  Medications, vital signs, orders, and nursing notes.    ROS:  4 point ROS including Respiratory, CV, GI and , other than that noted in the HPI,  is negative    Vitals:  /71   Pulse 73   Temp 97.7  F (36.5  C)   Resp 18   Ht 1.549 m (5' 1\")   Wt 61.5 kg (135 lb 9.6 oz)   SpO2 97%   BMI 25.62 kg/m    Body mass index is 25.62 kg/m .  Exam:  GENERAL APPEARANCE:  Alert, in no distress, pleasant   ENT:  Mouth and posterior oropharynx normal, dry mucous membranes  EYES:  EOM, conjunctivae WNL,  right eye esotropia  RESP: LS grossly clear-no cough or wheeze with exam, some rhonchi at baseline    CV:  trace edema-no compression  ABDOMEN:  no guarding or rebound  M/S:   kyphotic stance, some generalized weakness but at baseline   SKIN:  intact to visualized areas-lesion on forehead is raised, dry and flaky, left nasal lesion  NEURO:   Cranial nerves 2-12 are normal tested and grossly at patient's baseline  PSYCH: reduced insight, judgement and memory    Lab/Diagnostic data:   Reviewed onsite not uploaded to [facility]    ASSESSMENT/PLAN  (J44.9) Chronic obstructive pulmonary disease, unspecified COPD type (H)  (primary encounter diagnosis)  (J45.40) Moderate " persistent asthma without complication  Comment: Noted on CXR of 5/21/21  Plan:   -adding tiotropium (SPIRIVA RESPIMAT) 2.5 MCG/ACT   -Proair 2 puff q4H prn   -Advair 1 puff every 12 hours prn         (I11.0) Hypertensive heart disease with heart failure (H)  (I50.32) Chronic diastolic congestive heart failure (H)  (I10) Essential hypertension  (Z86.73) History of CVA (cerebrovascular accident)  (I25.10) Coronary artery disease involving native coronary artery without angina pectoris, unspecified whether native or transplanted heart  Comment: BP average 142/79 HR 72-82. Wt down 7 lbs this month?  Plan:   -isosorbide mononitrate (IMDUR) ER from 30 mg po daily to 45 mg po daily   -metoprolol 25 mg po BID  -losartan 75 mg po daily increased to 100 mg po daily   -HCTZ 25 mg po every other day  -HR/BP TID for now with weekly weights   -nitroglycerin prn    (G47.00) Insomnia, unspecified type  Comment: refuses to discontinue use of zolpidem. Several providers has discussed/documented risks>benefits.   Plan:   -zolpidem 2.5 mg po daily prn at HS (used 4x this month)  -prior use of melatonin     (L98.9) Skin lesion  Comment: forehead and right nose. Says has been seen in past from dermatology who is not concerned  Plan:  -discuss with her daughter during next call   -continue to monitor     -pre-op in future for 8/2021 cataract surgery if plan is to follow through       Electronically signed by:  AZAEL Martínez CNP       Annual Wellness Visit    Are you in the first 12 months of your Medicare Part B coverage?  No    Physical Health:    In general, how would you rate your overall physical health? fair    Outside of work, how many days during the week do you exercise?none    Outside of work, approximately how many minutes a day do you exercise?not applicable    If you drink alcohol do you typically have >3 drinks per day or >7 drinks per week? Not Applicable    Do you usually eat at least 4 servings of fruit and  "vegetables a day, include whole grains & fiber and avoid regularly eating high fat or \"junk\" foods? Yes    Do you have any problems taking medications regularly? No     Do you have any side effects from medications? tired and fatigue    Needs assistance for the following daily activities: telephone use, transportation, shopping, preparing meals, housework, bathing, laundry, money management and taking medicine    Which of the following safety concerns are present in your home?  none identified     Hearing impairment: Yes, audiology appt on 6/22/21    In the past 6 months, have you been bothered by leaking of urine? no    Mental Health:    In general, how would you rate your overall mental or emotional health? fair        PHQ-2 Score: 6.0      Do you feel safe in your environment? Sometimes    Have you ever done Advance Care Planning? (For example, a Health Directive, POLST, or a discussion with a medical provider or your loved ones about your wishes)? Yes, advance care planning is on file.    Fall risk: High fall risk 17.0 on [place]        Cognitive Screening: BIMS 14.0    Do you have sleep apnea, excessive snoring or daytime drowsiness?: yes    Current providers sharing in care for this patient include:   Patient Care Team:  Hao Araujo APRN CNP as PCP - General (Nurse Practitioner - Adult Health)  Amy Bernard as Nursing Assistant (Gerontology)  Mauricio Ortega MD as MD (Internal Medicine - Geriatric Medicine)  Hao Araujo APRN CNP as Assigned PCP                            "

## 2021-06-11 NOTE — LETTER
6/11/2021        RE: Ermelinda Pfeiffer  [address]  [facility] [address]        [place] [facility]  Chief Complaint   Patient presents with     [facility] Regulatory     Wellness Visit     [facility] Record Number:  [id number]  Place of Service where encounter took place:  [facility] () [[id number]]    HPI:    Ermelinda Pfeiffer  is 89 year old (10/19/1930), who is being seen today for a federally mandated E/M visit.  HPI information obtained from: facility chart records, patient report and [facility] Epic chart review. Today's concerns are:    Seen today for follow up to chronic disease management. In her room taking a nap. Says her feet intermittently hurt (not tingle/burn) but doesn't want anything that could make her more tired or fatigued. Doesn't want tylenol. Really would like to move into her adult daughters home and we discuss again why this is not a good option for her or Rosalba. Appears to lack some awareness of her daily needs and skilled nursing facility is appropriate.  Treated last month for increased cough/wheeze/SOB and responded to duonebs. No respiratory s/s noted today but continues to have intermittent wheeze per staff. Denies pain, chest pain, abdominal pain.      ALLERGIES:Sulfamethoxazole-trimethoprim, Levofloxacin, Clarithromycin, Mirtazapine, and Penicillin g  PAST MEDICAL HISTORY:   has a past medical history of Acute sinus infection, Anemia, CAD (coronary artery disease), Cancer of the skin, basal cell, Cataract, Depressive disorder, Diplopia, Disturbance, sleep, GERD (gastroesophageal reflux disease), HTN (hypertension), Hyperlipidemia, Insomnia, Myocardial infarction (H), Osteoporosis, Skin cancer, Status post parathyroidectomy (05/23/2016), Stone, kidney, and Transient ischemic attack.  PAST SURGICAL HISTORY:   has a past surgical history that includes Parathyroidectomy (1969) and PERCUTANEOUS TRANSLUMINAL BALLOON ANGIOPLASTY WITH  INSERTION OF STENT INTO CORONARY ARTERY N/A  (10/29/1997).  FAMILY HISTORY: family history includes Suicide in her maternal aunt and maternal uncle.  SOCIAL HISTORY:  reports that she has quit smoking. She has a 3.25 pack-year smoking history. She has never used smokeless tobacco. She reports that she does not drink alcohol or use drugs.    MEDICATIONS:  Current Outpatient Medications   Medication Sig Dispense Refill     tiotropium (SPIRIVA RESPIMAT) 2.5 MCG/ACT inhaler Inhale 2 puffs into the lungs daily 4 g 11     albuterol (PROAIR HFA/PROVENTIL HFA/VENTOLIN HFA) 108 (90 Base) MCG/ACT inhaler Inhale 2 puffs into the lungs every 4 hours as needed for shortness of breath / dyspnea or wheezing       clopidogrel (PLAVIX) 75 MG tablet Take 75 mg by mouth daily       escitalopram (LEXAPRO) 5 MG tablet Take 5 mg by mouth daily       ferrous fumarate 65 mg, [place]. FE,-Vitamin C 125 mg (VITRON C)  MG TABS tablet Take 1 tablet by mouth daily       fluticasone-salmeterol (ADVAIR) 500-50 MCG/DOSE inhaler Inhale 1 puff into the lungs every 12 hours for Asthma Rinse and spit after each use.       hydrochlorothiazide (HYDRODIURIL) 25 MG tablet Take 25 mg by mouth daily every other day for blood pressure and fluid managment       isosorbide mononitrate (IMDUR) 30 MG 24 hr tablet Take 1 tablet (30 mg) by mouth daily AND 0.5 tablets (15 mg) daily. 30 tablet 4     lidocaine (LIDODERM) 5 % patch Place 1 patch onto the skin daily as needed for moderate pain To prevent lidocaine toxicity, patient should be patch free for 12 hrs daily.       losartan (COZAAR) 50 MG tablet Take 2 tablets (100 mg) by mouth daily 30 tablet 1     metoprolol tartrate (LOPRESSOR) 25 MG tablet Take 1 tablet (25 mg) by mouth 2 times daily Hold for SBP <100 or HR <60. Goal -130       nitroGLYcerin (NITROSTAT) 0.4 MG sublingual tablet Place 0.4 mg under the tongue every 5 minutes as needed for chest pain For chest pain place 1 tablet under the tongue  "every 5 minutes for 3 doses. If symptoms persist 5 minutes after 1st dose call 911.       nystatin (MYCOSTATIN) 617313 UNIT/GM external powder Apply topically as needed       pantoprazole (PROTONIX) 40 MG EC tablet Take 40 mg by mouth daily for Esophagitis Before breakfast       rosuvastatin (CRESTOR) 5 MG tablet Take 5 mg by mouth every other day At Bedtime       SENNA-docusate sodium (SENNA S) 8.6-50 MG tablet Take 1 tablet by mouth 2 times daily as needed       Vitamin D, Cholecalciferol, 25 MCG (1000 UT) TABS Take 1 tablet by mouth daily       zolpidem (AMBIEN) 5 MG tablet Take 0.5 tablets (2.5 mg) by mouth nightly as needed for sleep 180 tablet 0     Case Management:  I have reviewed the care plan and MDS and do agree with the plan. Patient's desire to return to the community is present, but is not able due to care needs . Information reviewed:  Medications, vital signs, orders, and nursing notes.    ROS:  4 point ROS including Respiratory, CV, GI and , other than that noted in the HPI,  is negative    Vitals:  /71   Pulse 73   Temp 97.7  F (36.5  C)   Resp 18   Ht 1.549 m (5' 1\")   Wt 61.5 kg (135 lb 9.6 oz)   SpO2 97%   BMI 25.62 kg/m    Body mass index is 25.62 kg/m .  Exam:  GENERAL APPEARANCE:  Alert, in no distress, pleasant   ENT:  Mouth and posterior oropharynx normal, dry mucous membranes  EYES:  EOM, conjunctivae WNL,  right eye esotropia  RESP: LS grossly clear-no cough or wheeze with exam, some rhonchi at baseline    CV:  trace edema-no compression  ABDOMEN:  no guarding or rebound  M/S:   kyphotic stance, some generalized weakness but at baseline   SKIN:  intact to visualized areas-lesion on forehead is raised, dry and flaky, left nasal lesion  NEURO:   Cranial nerves 2-12 are normal tested and grossly at patient's baseline  PSYCH: reduced insight, judgement and memory    Lab/Diagnostic data:   Reviewed onsite not uploaded to [facility]    ASSESSMENT/PLAN  (J44.9) Chronic obstructive " pulmonary disease, unspecified COPD type (H)  (primary encounter diagnosis)  (J45.40) Moderate persistent asthma without complication  Comment: Noted on CXR of 5/21/21  Plan:   -adding tiotropium (SPIRIVA RESPIMAT) 2.5 MCG/ACT   -Proair 2 puff q4H prn   -Advair 1 puff every 12 hours prn         (I11.0) Hypertensive heart disease with heart failure (H)  (I50.32) Chronic diastolic congestive heart failure (H)  (I10) Essential hypertension  (Z86.73) History of CVA (cerebrovascular accident)  (I25.10) Coronary artery disease involving native coronary artery without angina pectoris, unspecified whether native or transplanted heart  Comment: BP average 142/79 HR 72-82. Wt down 7 lbs this month?  Plan:   -isosorbide mononitrate (IMDUR) ER from 30 mg po daily to 45 mg po daily   -metoprolol 25 mg po BID  -losartan 75 mg po daily increased to 100 mg po daily   -HCTZ 25 mg po every other day  -HR/BP TID for now with weekly weights   -nitroglycerin prn    (G47.00) Insomnia, unspecified type  Comment: refuses to discontinue use of zolpidem. Several providers has discussed/documented risks>benefits.   Plan:   -zolpidem 2.5 mg po daily prn at HS (used 4x this month)  -prior use of melatonin     (L98.9) Skin lesion  Comment: forehead and right nose. Says has been seen in past from dermatology who is not concerned  Plan:  -discuss with her daughter during next call   -continue to monitor     -pre-op in future for 8/2021 cataract surgery if plan is to follow through       Electronically signed by:  AZAEL Martínez CNP       Annual Wellness Visit    Are you in the first 12 months of your Medicare Part B coverage?  No    Physical Health:    In general, how would you rate your overall physical health? fair    Outside of work, how many days during the week do you exercise?none    Outside of work, approximately how many minutes a day do you exercise?not applicable    If you drink alcohol do you typically have >3 drinks per day or  ">7 drinks per week? Not Applicable    Do you usually eat at least 4 servings of fruit and vegetables a day, include whole grains & fiber and avoid regularly eating high fat or \"junk\" foods? Yes    Do you have any problems taking medications regularly? No     Do you have any side effects from medications? tired and fatigue    Needs assistance for the following daily activities: telephone use, transportation, shopping, preparing meals, housework, bathing, laundry, money management and taking medicine    Which of the following safety concerns are present in your home?  none identified     Hearing impairment: Yes, audiology appt on 6/22/21    In the past 6 months, have you been bothered by leaking of urine? no    Mental Health:    In general, how would you rate your overall mental or emotional health? fair        PHQ-2 Score: 6.0      Do you feel safe in your environment? Sometimes    Have you ever done Advance Care Planning? (For example, a Health Directive, POLST, or a discussion with a medical provider or your loved ones about your wishes)? Yes, advance care planning is on file.    Fall risk: High fall risk 17.0 on [place]        Cognitive Screening: BIMS 14.0    Do you have sleep apnea, excessive snoring or daytime drowsiness?: yes    Current providers sharing in care for this patient include:   Patient Care Team:  Hao Araujo APRN CNP as PCP - General (Nurse Practitioner - Adult Health)  Amy Bernard as Nursing Assistant (Gerontology)  Mauricio Ortega MD as MD (Internal Medicine - Geriatric Medicine)  Hao Araujo APRN CNP as Assigned PCP                                  Sincerely,        AZAEL Martínez CNP    "

## 2021-06-27 NOTE — TELEPHONE ENCOUNTER
Assessment:  -Patient has a new bruise on her right hip. She does not know how she got it. She though she bumped something when she was out with her family      Plan:  -Continue to monitor      Electronically signed by  Deysi Harris CNP

## 2021-07-15 NOTE — LETTER
7/15/2021        RE: Ermelinda Pfeiffer  [address]  [facility] [address]        [place] [facility]  [facility] Medical Record Number:  [id number]  Place of Service where encounter took place:  [facility] () [[id number]]  Chief Complaint   Patient presents with     Nursing Home Acute       HPI:    Ermelinda Pfeiffer  is a 89 year old (10/19/1930), who is being seen today for an episodic care visit.  HPI information obtained from: facility chart records, facility staff, patient report and [facility] chart review. Today's concern is:    Continues to have intermittent bilateral heel pain (not new) but now reporting that it travels to the tips of her toes. Happens at night approximately 3-4 times weekly. She has tired topicals, elevating the heels, heel boots. Also noticed with a 0.1 cm scab in the middle of her 3rd toe on right foot. No warmth, redness, slight swelling. Staff believes cause by wearing sandals  in bed     Chart review and will have cataract surgery 9/17 so will do a pre-op if needed when closer to the date.     Past Medical and Surgical History reviewed in Epic today.    MEDICATIONS:    Current Outpatient Medications   Medication Sig Dispense Refill     albuterol (PROAIR HFA/PROVENTIL HFA/VENTOLIN HFA) 108 (90 Base) MCG/ACT inhaler Inhale 2 puffs into the lungs every 4 hours as needed for shortness of breath / dyspnea or wheezing       clopidogrel (PLAVIX) 75 MG tablet Take 75 mg by mouth daily       escitalopram (LEXAPRO) 5 MG tablet Take 5 mg by mouth daily       ferrous fumarate 65 mg, [place]. FE,-Vitamin C 125 mg (VITRON C)  MG TABS tablet Take 1 tablet by mouth daily       fluticasone-salmeterol (ADVAIR) 500-50 MCG/DOSE inhaler Inhale 1 puff into the lungs every 12 hours for Asthma Rinse and spit after each use.       hydrochlorothiazide (HYDRODIURIL) 25 MG tablet Take 25 mg by mouth daily every other day for blood pressure and fluid managment        "isosorbide mononitrate (IMDUR) 30 MG 24 hr tablet Take 1 tablet (30 mg) by mouth daily AND 0.5 tablets (15 mg) daily. 30 tablet 4     lidocaine (LIDODERM) 5 % patch Place 1 patch onto the skin daily as needed for moderate pain To prevent lidocaine toxicity, patient should be patch free for 12 hrs daily.       losartan (COZAAR) 50 MG tablet Take 2 tablets (100 mg) by mouth daily 30 tablet 1     metoprolol tartrate (LOPRESSOR) 25 MG tablet Take 1 tablet (25 mg) by mouth 2 times daily Hold for SBP <100 or HR <60. Goal -130       nitroGLYcerin (NITROSTAT) 0.4 MG sublingual tablet Place 0.4 mg under the tongue every 5 minutes as needed for chest pain For chest pain place 1 tablet under the tongue every 5 minutes for 3 doses. If symptoms persist 5 minutes after 1st dose call 911.       nystatin (MYCOSTATIN) 995299 UNIT/GM external powder Apply topically as needed       pantoprazole (PROTONIX) 40 MG EC tablet Take 40 mg by mouth daily for Esophagitis Before breakfast       rosuvastatin (CRESTOR) 5 MG tablet Take 5 mg by mouth every other day At Bedtime       SENNA-docusate sodium (SENNA S) 8.6-50 MG tablet Take 1 tablet by mouth 2 times daily as needed       tiotropium (SPIRIVA RESPIMAT) 2.5 MCG/ACT inhaler Inhale 2 puffs into the lungs daily 4 g 11     Vitamin D, Cholecalciferol, 25 MCG (1000 UT) TABS Take 1 tablet by mouth daily       zolpidem (AMBIEN) 5 MG tablet Take 0.5 tablets (2.5 mg) by mouth nightly as needed for sleep 180 tablet 0     REVIEW OF SYSTEMS:  4 point ROS including Respiratory, CV, GI and , other than that noted in the HPI,  is negative    Objective:  /73   Pulse 74   Temp 98.1  F (36.7  C)   Resp 18   Ht 1.549 m (5' 1\")   Wt 65.6 kg (144 lb 9.6 oz)   SpO2 96%   BMI 27.32 kg/m    Exam:  GENERAL APPEARANCE:  Alert, in no distress, pleasant   ENT:  Mouth and posterior oropharynx normal, dry mucous membranes  EYES:  EOM, conjunctivae WNL,  right eye esotropia, periorbital edema " bilateral   RESP: LS grossly clear-no cough or wheeze with exam, some rhonchi at baseline (not today)  CV:  trace edema-no compression  ABDOMEN:  no guarding or rebound  M/S:   kyphotic stance, some generalized weakness but at baseline   SKIN:  intact to visualized areas-lesion on forehead is raised, dry and flaky, left nasal lesion, wound on right 3rd toe  NEURO:   Cranial nerves 2-12 are normal tested and grossly at patient's baseline  PSYCH: reduced insight, judgement and memory    Labs:   Reviewed onsite not uploaded to Epic    ASSESSMENT/PLAN:  (M79.671,  M79.672) Heel pain, bilateral  (primary encounter diagnosis)  Comment: spreads to toes, only at HS  Plan:   -does not want scheduled gabapentin but willing for prn. Not sure will provide relief but will try    (L98.9) Skin lesion  Comment: caused by shoes  Plan:   -continue to monitor         Electronically signed by:  AZAEL Martínez CNP                 Sincerely,        AZAEL Martínez CNP

## 2021-07-15 NOTE — PROGRESS NOTES
[place] [facility]  [facility] Medical Record Number:  [id number]  Place of Service where encounter took place:  [facility] () [[id number]]  Chief Complaint   Patient presents with     Nursing Home Acute       HPI:    Ermelinda Pfeiffer  is a 89 year old (10/19/1930), who is being seen today for an episodic care visit.  HPI information obtained from: facility chart records, facility staff, patient report and [facility] chart review. Today's concern is:    Continues to have intermittent bilateral heel pain (not new) but now reporting that it travels to the tips of her toes. Happens at night approximately 3-4 times weekly. She has tired topicals, elevating the heels, heel boots. Also noticed with a 0.1 cm scab in the middle of her 3rd toe on right foot. No warmth, redness, slight swelling. Staff believes cause by wearing sandals  in bed     Chart review and will have cataract surgery 9/17 so will do a pre-op if needed when closer to the date.     Past Medical and Surgical History reviewed in Epic today.    MEDICATIONS:    Current Outpatient Medications   Medication Sig Dispense Refill     albuterol (PROAIR HFA/PROVENTIL HFA/VENTOLIN HFA) 108 (90 Base) MCG/ACT inhaler Inhale 2 puffs into the lungs every 4 hours as needed for shortness of breath / dyspnea or wheezing       clopidogrel (PLAVIX) 75 MG tablet Take 75 mg by mouth daily       escitalopram (LEXAPRO) 5 MG tablet Take 5 mg by mouth daily       ferrous fumarate 65 mg, [place]. FE,-Vitamin C 125 mg (VITRON C)  MG TABS tablet Take 1 tablet by mouth daily       fluticasone-salmeterol (ADVAIR) 500-50 MCG/DOSE inhaler Inhale 1 puff into the lungs every 12 hours for Asthma Rinse and spit after each use.       hydrochlorothiazide (HYDRODIURIL) 25 MG tablet Take 25 mg by mouth daily every other day for blood pressure and fluid managment       isosorbide mononitrate (IMDUR) 30 MG 24 hr tablet Take 1 tablet (30 mg) by mouth daily AND 0.5 tablets  "(15 mg) daily. 30 tablet 4     lidocaine (LIDODERM) 5 % patch Place 1 patch onto the skin daily as needed for moderate pain To prevent lidocaine toxicity, patient should be patch free for 12 hrs daily.       losartan (COZAAR) 50 MG tablet Take 2 tablets (100 mg) by mouth daily 30 tablet 1     metoprolol tartrate (LOPRESSOR) 25 MG tablet Take 1 tablet (25 mg) by mouth 2 times daily Hold for SBP <100 or HR <60. Goal -130       nitroGLYcerin (NITROSTAT) 0.4 MG sublingual tablet Place 0.4 mg under the tongue every 5 minutes as needed for chest pain For chest pain place 1 tablet under the tongue every 5 minutes for 3 doses. If symptoms persist 5 minutes after 1st dose call 911.       nystatin (MYCOSTATIN) 105662 UNIT/GM external powder Apply topically as needed       pantoprazole (PROTONIX) 40 MG EC tablet Take 40 mg by mouth daily for Esophagitis Before breakfast       rosuvastatin (CRESTOR) 5 MG tablet Take 5 mg by mouth every other day At Bedtime       SENNA-docusate sodium (SENNA S) 8.6-50 MG tablet Take 1 tablet by mouth 2 times daily as needed       tiotropium (SPIRIVA RESPIMAT) 2.5 MCG/ACT inhaler Inhale 2 puffs into the lungs daily 4 g 11     Vitamin D, Cholecalciferol, 25 MCG (1000 UT) TABS Take 1 tablet by mouth daily       zolpidem (AMBIEN) 5 MG tablet Take 0.5 tablets (2.5 mg) by mouth nightly as needed for sleep 180 tablet 0     REVIEW OF SYSTEMS:  4 point ROS including Respiratory, CV, GI and , other than that noted in the HPI,  is negative    Objective:  /73   Pulse 74   Temp 98.1  F (36.7  C)   Resp 18   Ht 1.549 m (5' 1\")   Wt 65.6 kg (144 lb 9.6 oz)   SpO2 96%   BMI 27.32 kg/m    Exam:  GENERAL APPEARANCE:  Alert, in no distress, pleasant   ENT:  Mouth and posterior oropharynx normal, dry mucous membranes  EYES:  EOM, conjunctivae WNL,  right eye esotropia, periorbital edema bilateral   RESP: LS grossly clear-no cough or wheeze with exam, some rhonchi at baseline (not today)  CV:  " trace edema-no compression  ABDOMEN:  no guarding or rebound  M/S:   kyphotic stance, some generalized weakness but at baseline   SKIN:  intact to visualized areas-lesion on forehead is raised, dry and flaky, left nasal lesion, wound on right 3rd toe  NEURO:   Cranial nerves 2-12 are normal tested and grossly at patient's baseline  PSYCH: reduced insight, judgement and memory    Labs:   Reviewed onsite not uploaded to Epic    ASSESSMENT/PLAN:  (M79.671,  M79.672) Heel pain, bilateral  (primary encounter diagnosis)  Comment: spreads to toes, only at HS  Plan:   -does not want scheduled gabapentin but willing for prn. Not sure will provide relief but will try    (L98.9) Skin lesion  Comment: caused by shoes  Plan:   -continue to monitor         Electronically signed by:  AZAEL Martínez CNP

## 2021-07-15 NOTE — LETTER
Orders for Ermelinda Pfeiffer    1. gabapentin  gabapentin (NEURONTIN) 100 MG capsule  Take 1 capsule (100 mg) by mouth nightly as needed (neuropathic pain), Disp-30 capsule, R-1, E-Prescribe                     Electronically signed by:  AZAEL Martínez CNP

## 2021-07-16 NOTE — TELEPHONE ENCOUNTER
[facility] Nurse Triage Telephone Note    Provider: AZAEL Martinez CNP  Facility: [facility] Facility Type:  [facility]    Caller: Sofi  Call Back Number: [phone]    Allergies:    Allergies   Allergen Reactions     Sulfamethoxazole-Trimethoprim Rash     Levofloxacin Other (See Comments)     Red streaks on the side of nose & red cheeks     Clarithromycin Unknown     Mirtazapine Other (See Comments)     Grogginess in the morning after taking     Penicillin G Other (See Comments)     Penicillin consult. May use penicillin and cephalosporin - 19         Reason for call: Nurse calling because patient takes Ambien 2.5mg Q HS PRN, but per protocol, the Ambien is only good for 14 days at a time and is now .  Patient takes Ambien every night.  The facility does have a supply of medication.  Nurse is requesting to re-instate the Ambien order.      Verbal Order/Direction given by Provider: Re-instate Ambien 2.5mg Q HS PRN.      Provider giving Order:  AZAEL Stubbs CNP    Verbal Order given to: Sofi Hartman RN

## 2021-08-24 NOTE — TELEPHONE ENCOUNTER
PA Initiation    Medication: Ambien 5 mg tablets- INITIATED  Insurance Company: [employer] Part D - Phone [phone] Fax [phone]  Pharmacy Filling the Rx: [facility] - [place], [place] - [address]  Filling Pharmacy Phone: [phone]  Filling Pharmacy Fax: [phone]  Start Date: 8/24/2021

## 2021-08-24 NOTE — TELEPHONE ENCOUNTER
Prior Authorization Retail Medication Request    Medication/Dose: Zolpidem (Ambien) 5 mg tablets  ICD code: F51.01 (primary insomnia)  Rationale: Take 0.5 tabs (2.5 mg) PO at bedtime PRN    Primary Insurance Name: Medicare  Primary Insurance ID: [id number]  Secondary Insurance Name: [employer] [place]  Secondary Insurance ID: [id number]    Pharmacy Information  Name: [facility] - [place], [place]  Phone: [phone]

## 2021-08-25 NOTE — TELEPHONE ENCOUNTER
Prior Authorization Approval    Authorization Effective Date: 5/27/2021  Authorization Expiration Date: 8/25/2022  Medication: Ambien 5 mg tablets- APPROVED  Approved Dose/Quantity: 40 tabs  Reference #: [id number]   Insurance Company: [employer] Part D - Phone [phone] Fax [phone]  Expected CoPay:       CoPay Card Available:      Foundation Assistance Needed:    Which Pharmacy is filling the prescription (Not needed for infusion/clinic administered): [facility] - [place], [place] - [address]  Pharmacy Notified: Yes  Patient Notified: Yes            Central Prior Authorization Team  Phone: [phone]

## 2021-08-28 NOTE — PROGRESS NOTES
Pt was seen for a regulatory LTC visit    Case reviewed with NP    Pt notes infrequent SOB, denies chest pain or dizziness  She notes chronic insomnia (remains on Ambien)  She feels fatigued at times, wonders if this is related to BP medications  She continues to hope that she may eventually discharge to her daughter's home  BP meds have been gradually increased. She is currently receiving hydrochlorothiazide, isosorbide, losartan and meetoprolol.  BP systolic recently 140-150/    Pt is alert, comfortable, sitting in chair in room  Lungs clear  CV rrr  Abd soft  No LE edema      Assessment    Diastolic CHF, stable on current medication regimen    HTN, fair control on multiple medications    COPD, stable on spiriva, advair    History of CVA on plavix and statin    Chronic insomnia, on chronic ambien    Plan  Continue current tx  BP systolic 140 appears adequate  Routine lab monitoring

## 2021-09-30 NOTE — LETTER
9/30/2021        RE: Ermelinda Pfeiffer  [address]  [facility] [address]        [place]  [place] Medical Record Number:  [id number]  Place of Service where encounter took place:  [facility] () [[id number]]  Chief Complaint   Patient presents with     RECHECK       HPI:    Ermelinda Pfeiffer  is a 89 year old (10/19/1930), who is being seen today for an episodic care visit.  HPI information obtained from: patient report and family/first contact daughter report. Today's concern is:    Resident and her daughter requesting increase and scheduling of Ambien stating quality of life and goals of care. Longstanding history of insomnia (>4 decades) and previously followed in [facility]. Hx of difficulty initiating and maintaining sleep for more than 40 years. She has tried and failed CBT, Lunesta, Sonata, Belsomra, Remeron, Trazodone and gabapentin.     She came to [facility] on lower dose of Ambien (2.5 mg) than her previous dose (5 mg) and prior was on 10 mg. HS prn but mostly she requests every night. This did provide some relief for a while in addition to coupled with melatonin but she is reporting this is no longer the case. She has worked with facility to practice better sleep hygiene like staying awake during the days, limiting naps, but with not sleeping at night she reports not being successful. She does have some intermittent bilateral heel pain (not today), she does have a diagnosis of depression (on Escitalopram) which could contribute to insomnia but she denies these potential contributory factors today.     Past Medical and Surgical History reviewed in Epic today.    MEDICATIONS:    Current Outpatient Medications   Medication Sig Dispense Refill     zolpidem (AMBIEN) 5 MG tablet Take 1 tablet (5 mg) by mouth At Bedtime 30 tablet 1     albuterol (PROAIR HFA/PROVENTIL HFA/VENTOLIN HFA) 108 (90 Base) MCG/ACT inhaler Inhale 2 puffs into the lungs every 4 hours as  needed for shortness of breath / dyspnea or wheezing       clopidogrel (PLAVIX) 75 MG tablet Take 75 mg by mouth daily       escitalopram (LEXAPRO) 5 MG tablet Take 5 mg by mouth daily       ferrous fumarate 65 mg, [place]. FE,-Vitamin C 125 mg (VITRON C)  MG TABS tablet Take 1 tablet by mouth daily       fluticasone-salmeterol (ADVAIR) 500-50 MCG/DOSE inhaler Inhale 1 puff into the lungs every 12 hours for Asthma Rinse and spit after each use.       hydrochlorothiazide (HYDRODIURIL) 25 MG tablet Take 25 mg by mouth daily every other day for blood pressure and fluid managment       isosorbide mononitrate (IMDUR) 30 MG 24 hr tablet Take 1 tablet (30 mg) by mouth daily AND 0.5 tablets (15 mg) daily. 30 tablet 4     lidocaine (LIDODERM) 5 % patch Place 1 patch onto the skin daily as needed for moderate pain To prevent lidocaine toxicity, patient should be patch free for 12 hrs daily.       losartan (COZAAR) 50 MG tablet Take 2 tablets (100 mg) by mouth daily 30 tablet 1     metoprolol tartrate (LOPRESSOR) 25 MG tablet Take 1 tablet (25 mg) by mouth 2 times daily Hold for SBP <100 or HR <60. Goal -130       nitroGLYcerin (NITROSTAT) 0.4 MG sublingual tablet Place 0.4 mg under the tongue every 5 minutes as needed for chest pain For chest pain place 1 tablet under the tongue every 5 minutes for 3 doses. If symptoms persist 5 minutes after 1st dose call 911.       nystatin (MYCOSTATIN) 221648 UNIT/GM external powder Apply topically as needed       pantoprazole (PROTONIX) 40 MG EC tablet Take 40 mg by mouth daily for Esophagitis Before breakfast       rosuvastatin (CRESTOR) 5 MG tablet Take 5 mg by mouth every other day At Bedtime       SENNA-docusate sodium (SENNA S) 8.6-50 MG tablet Take 1 tablet by mouth 2 times daily as needed       tiotropium (SPIRIVA RESPIMAT) 2.5 MCG/ACT inhaler Inhale 2 puffs into the lungs daily 4 g 11     Vitamin D, Cholecalciferol, 25 MCG (1000 UT) TABS Take 1 tablet by mouth daily    "    REVIEW OF SYSTEMS:  4 point ROS including Respiratory, CV, GI and , other than that noted in the HPI,  is negative    Objective:  /65   Pulse 71   Temp 97.3  F (36.3  C)   Resp 18   Ht 1.549 m (5' 1\")   Wt 64.7 kg (142 lb 9.6 oz)   SpO2 95%   BMI 26.94 kg/m    Exam:  GENERAL APPEARANCE: sleeping in bed, awakes and is pleasant   ENT:  Mouth and posterior oropharynx normal, dry mucous membranes  EYES:  EOM, conjunctivae WNL,  right eye esotropia, periorbital edema bilateral   RESP: LS grossly clear-no cough or wheeze with exam, some rhonchi at baseline (not today)  CV:  trace edema-no compression  ABDOMEN:  no guarding or rebound  M/S:   kyphotic stance, some generalized weakness but at baseline   SKIN:  intact to visualized areas-lesion on forehead is raised, dry and flaky, left nasal lesion-black  NEURO:   Cranial nerves 2-12 are normal tested and grossly at patient's baseline  PSYCH: reduced insight, judgement and memory    Labs:   CBC RESULTS: Recent Labs   Lab Test 05/25/21  1105 04/30/21  0658   WBC 6.8 6.1   RBC 3.56* 3.43*   HGB 10.4* 10.2*   HCT 34.5* 33.1*   MCV 97 97   MCH 29.2 29.7   MCHC 30.1* 30.8*   RDW 13.2 13.3    208       Last Basic Metabolic Panel:  Recent Labs   Lab Test 05/25/21  1105 04/30/21  0658    141   POTASSIUM 3.6 3.7   CHLORIDE 104 103   NELSON 8.8 8.9   CO2 29 32*   BUN 24 24   CR 0.77 0.81    97       Liver Function Studies -   Recent Labs   Lab Test 02/02/21  1155   PROTTOTAL 6.6   ALBUMIN 3.3*   BILITOTAL 0.7   ALKPHOS 68   AST 19   ALT 13       TSH   Date Value Ref Range Status   02/02/2021 2.28 0.30 - 5.00 uIU/mL Final       No results found for: A1C      ASSESSMENT/PLAN:  (F51.01) Primary insomnia  (primary encounter diagnosis)  Comment: reviewed black box warning and the list of all adverse and common reactions with both Ermelinda and her daughter including depression exacerbation, suicidality, hallucinations drowsiness, risk of falls, potential " contributor to injury leading to death and they would like medication increased stating goals of care for quality over quantity of life  Plan:   -zolpidem (AMBIEN) 5 MG PO DAILY AT HS  -discontinue melatonin   -facility to monitor for increased sedation and other noted SE                  Electronically signed by:  AZAEL Martínez CNP                 Sincerely,        AZAEL Martínez CNP

## 2021-09-30 NOTE — PROGRESS NOTES
[place] [facility]  [place] Medical Record Number:  [id number]  Place of Service where encounter took place:  [facility] () [[id number]]  Chief Complaint   Patient presents with     RECHECK       HPI:    Ermelinda Pfeiffer  is a 89 year old (10/19/1930), who is being seen today for an episodic care visit.  HPI information obtained from: patient report and family/first contact daughter report. Today's concern is:    Resident and her daughter requesting increase and scheduling of Ambien stating quality of life and goals of care. Longstanding history of insomnia (>4 decades) and previously followed in [facility]. Hx of difficulty initiating and maintaining sleep for more than 40 years. She has tried and failed CBT, Lunesta, Sonata, Belsomra, Remeron, Trazodone and gabapentin.     She came to [facility] on lower dose of Ambien (2.5 mg) than her previous dose (5 mg) and prior was on 10 mg. HS prn but mostly she requests every night. This did provide some relief for a while in addition to coupled with melatonin but she is reporting this is no longer the case. She has worked with facility to practice better sleep hygiene like staying awake during the days, limiting naps, but with not sleeping at night she reports not being successful. She does have some intermittent bilateral heel pain (not today), she does have a diagnosis of depression (on Escitalopram) which could contribute to insomnia but she denies these potential contributory factors today.     Past Medical and Surgical History reviewed in Epic today.    MEDICATIONS:    Current Outpatient Medications   Medication Sig Dispense Refill     zolpidem (AMBIEN) 5 MG tablet Take 1 tablet (5 mg) by mouth At Bedtime 30 tablet 1     albuterol (PROAIR HFA/PROVENTIL HFA/VENTOLIN HFA) 108 (90 Base) MCG/ACT inhaler Inhale 2 puffs into the lungs every 4 hours as needed for shortness of breath / dyspnea or wheezing       clopidogrel (PLAVIX) 75 MG tablet Take 75  mg by mouth daily       escitalopram (LEXAPRO) 5 MG tablet Take 5 mg by mouth daily       ferrous fumarate 65 mg, [place]. FE,-Vitamin C 125 mg (VITRON C)  MG TABS tablet Take 1 tablet by mouth daily       fluticasone-salmeterol (ADVAIR) 500-50 MCG/DOSE inhaler Inhale 1 puff into the lungs every 12 hours for Asthma Rinse and spit after each use.       hydrochlorothiazide (HYDRODIURIL) 25 MG tablet Take 25 mg by mouth daily every other day for blood pressure and fluid managment       isosorbide mononitrate (IMDUR) 30 MG 24 hr tablet Take 1 tablet (30 mg) by mouth daily AND 0.5 tablets (15 mg) daily. 30 tablet 4     lidocaine (LIDODERM) 5 % patch Place 1 patch onto the skin daily as needed for moderate pain To prevent lidocaine toxicity, patient should be patch free for 12 hrs daily.       losartan (COZAAR) 50 MG tablet Take 2 tablets (100 mg) by mouth daily 30 tablet 1     metoprolol tartrate (LOPRESSOR) 25 MG tablet Take 1 tablet (25 mg) by mouth 2 times daily Hold for SBP <100 or HR <60. Goal -130       nitroGLYcerin (NITROSTAT) 0.4 MG sublingual tablet Place 0.4 mg under the tongue every 5 minutes as needed for chest pain For chest pain place 1 tablet under the tongue every 5 minutes for 3 doses. If symptoms persist 5 minutes after 1st dose call 911.       nystatin (MYCOSTATIN) 844605 UNIT/GM external powder Apply topically as needed       pantoprazole (PROTONIX) 40 MG EC tablet Take 40 mg by mouth daily for Esophagitis Before breakfast       rosuvastatin (CRESTOR) 5 MG tablet Take 5 mg by mouth every other day At Bedtime       SENNA-docusate sodium (SENNA S) 8.6-50 MG tablet Take 1 tablet by mouth 2 times daily as needed       tiotropium (SPIRIVA RESPIMAT) 2.5 MCG/ACT inhaler Inhale 2 puffs into the lungs daily 4 g 11     Vitamin D, Cholecalciferol, 25 MCG (1000 UT) TABS Take 1 tablet by mouth daily       REVIEW OF SYSTEMS:  4 point ROS including Respiratory, CV, GI and , other than that noted in the  "HPI,  is negative    Objective:  /65   Pulse 71   Temp 97.3  F (36.3  C)   Resp 18   Ht 1.549 m (5' 1\")   Wt 64.7 kg (142 lb 9.6 oz)   SpO2 95%   BMI 26.94 kg/m    Exam:  GENERAL APPEARANCE: sleeping in bed, awakes and is pleasant   ENT:  Mouth and posterior oropharynx normal, dry mucous membranes  EYES:  EOM, conjunctivae WNL,  right eye esotropia, periorbital edema bilateral   RESP: LS grossly clear-no cough or wheeze with exam, some rhonchi at baseline (not today)  CV:  trace edema-no compression  ABDOMEN:  no guarding or rebound  M/S:   kyphotic stance, some generalized weakness but at baseline   SKIN:  intact to visualized areas-lesion on forehead is raised, dry and flaky, left nasal lesion-black  NEURO:   Cranial nerves 2-12 are normal tested and grossly at patient's baseline  PSYCH: reduced insight, judgement and memory    Labs:   CBC RESULTS: Recent Labs   Lab Test 05/25/21  1105 04/30/21  0658   WBC 6.8 6.1   RBC 3.56* 3.43*   HGB 10.4* 10.2*   HCT 34.5* 33.1*   MCV 97 97   MCH 29.2 29.7   MCHC 30.1* 30.8*   RDW 13.2 13.3    208       Last Basic Metabolic Panel:  Recent Labs   Lab Test 05/25/21  1105 04/30/21  0658    141   POTASSIUM 3.6 3.7   CHLORIDE 104 103   NELSON 8.8 8.9   CO2 29 32*   BUN 24 24   CR 0.77 0.81    97       Liver Function Studies -   Recent Labs   Lab Test 02/02/21  1155   PROTTOTAL 6.6   ALBUMIN 3.3*   BILITOTAL 0.7   ALKPHOS 68   AST 19   ALT 13       TSH   Date Value Ref Range Status   02/02/2021 2.28 0.30 - 5.00 uIU/mL Final       No results found for: A1C      ASSESSMENT/PLAN:  (F51.01) Primary insomnia  (primary encounter diagnosis)  Comment: reviewed black box warning and the list of all adverse and common reactions with both Ermelinda and her daughter including depression exacerbation, suicidality, hallucinations drowsiness, risk of falls, potential contributor to injury leading to death and they would like medication increased stating goals of care for " quality over quantity of life  Plan:   -zolpidem (AMBIEN) 5 MG PO DAILY AT HS  -discontinue melatonin   -facility to monitor for increased sedation and other noted SE                  Electronically signed by:  AZAEL Martínez CNP

## 2021-10-08 NOTE — LETTER
10/8/2021        RE: Ermelinda Pfeiffer  [address]  [facility] [address]        [place] [facility]  Chief Complaint   Patient presents with     half-way Regulatory     [place] Medical Record Number:  [id number]  Place of Service where encounter took place:  [facility] () [[id number]]    HPI:    Ermelinda Pfeiffer  is 89 year old (10/19/1930), who is being seen today for a federally mandated E/M visit.  HPI information obtained from: facility chart records, facility staff and [facility] Epic chart review. Today's concerns are:    Resident and her daughter requested increase and scheduling of Ambien which started on 10/1 stating quality of life and goals of care. Longstanding history of insomnia (>4 decades) and previously followed in Sleep Center. Hx of difficulty initiating and maintaining sleep for more than 40 years. She has tried and failed CBT, Lunesta, Sonata, Belsomra, Remeron, Trazodone and gabapentin. Daughter thinks improvement and she was up participating in activities on the unit today. Ermelinda says she is still struggling to fall back asleep after waking up in the middle of night. Staff say she still sleep much during the day.     HTN and CHF stable on current regimen. She has been resistant with using diuretic 2/2 increase need of urination. On low dose HCTZ.      History of UTIs and during state audit they found some concerns with storage of her wipes. She prefers to be independent with toileting cares and this could be contributor. Also with pessary in place.       ALLERGIES:Sulfamethoxazole-trimethoprim, Levofloxacin, Clarithromycin, Mirtazapine, and Penicillin g  PAST MEDICAL HISTORY:   has a past medical history of Acute sinus infection, Anemia, CAD (coronary artery disease), Cancer of the skin, basal cell, Cataract, Depressive disorder, Diplopia, Disturbance, sleep, GERD (gastroesophageal reflux disease), HTN (hypertension), Hyperlipidemia, Insomnia,  Myocardial infarction (H), Osteoporosis, Skin cancer, Status post parathyroidectomy (05/23/2016), Stone, kidney, and Transient ischemic attack.  PAST SURGICAL HISTORY:   has a past surgical history that includes Parathyroidectomy (1969) and PERCUTANEOUS TRANSLUMINAL BALLOON ANGIOPLASTY WITH INSERTION OF STENT INTO CORONARY ARTERY N/A  (10/29/1997).  FAMILY HISTORY: family history includes Suicide in her maternal aunt and maternal uncle.  SOCIAL HISTORY:  reports that she has quit smoking. She has a 3.25 pack-year smoking history. She has never used smokeless tobacco. She reports that she does not drink alcohol and does not use drugs.    MEDICATIONS:  Current Outpatient Medications   Medication Sig Dispense Refill     albuterol (PROAIR HFA/PROVENTIL HFA/VENTOLIN HFA) 108 (90 Base) MCG/ACT inhaler Inhale 2 puffs into the lungs every 4 hours as needed for shortness of breath / dyspnea or wheezing       clopidogrel (PLAVIX) 75 MG tablet Take 75 mg by mouth daily       escitalopram (LEXAPRO) 5 MG tablet Take 5 mg by mouth daily       ferrous fumarate 65 mg, [place]. FE,-Vitamin C 125 mg (VITRON C)  MG TABS tablet Take 1 tablet by mouth daily       fluticasone-salmeterol (ADVAIR) 500-50 MCG/DOSE inhaler Inhale 1 puff into the lungs every 12 hours for Asthma Rinse and spit after each use.       hydrochlorothiazide (HYDRODIURIL) 25 MG tablet Take 25 mg by mouth daily every other day for blood pressure and fluid managment       isosorbide mononitrate (IMDUR) 30 MG 24 hr tablet Take 1 tablet (30 mg) by mouth daily AND 0.5 tablets (15 mg) daily. 30 tablet 4     lidocaine (LIDODERM) 5 % patch Place 1 patch onto the skin daily as needed for moderate pain To prevent lidocaine toxicity, patient should be patch free for 12 hrs daily.       losartan (COZAAR) 50 MG tablet Take 2 tablets (100 mg) by mouth daily 30 tablet 1     metoprolol tartrate (LOPRESSOR) 25 MG tablet Take 1 tablet (25 mg) by mouth 2 times daily Hold for SBP  "<100 or HR <60. Goal -130       nitroGLYcerin (NITROSTAT) 0.4 MG sublingual tablet Place 0.4 mg under the tongue every 5 minutes as needed for chest pain For chest pain place 1 tablet under the tongue every 5 minutes for 3 doses. If symptoms persist 5 minutes after 1st dose call 911.       nystatin (MYCOSTATIN) 264208 UNIT/GM external powder Apply topically as needed       pantoprazole (PROTONIX) 40 MG EC tablet Take 40 mg by mouth daily for Esophagitis Before breakfast       rosuvastatin (CRESTOR) 5 MG tablet Take 5 mg by mouth every other day At Bedtime       SENNA-docusate sodium (SENNA S) 8.6-50 MG tablet Take 1 tablet by mouth 2 times daily as needed       tiotropium (SPIRIVA RESPIMAT) 2.5 MCG/ACT inhaler Inhale 2 puffs into the lungs daily 4 g 11     Vitamin D, Cholecalciferol, 25 MCG (1000 UT) TABS Take 1 tablet by mouth daily       zolpidem (AMBIEN) 5 MG tablet Take 1 tablet (5 mg) by mouth At Bedtime 30 tablet 1     Case Management:  I have reviewed the care plan and MDS and do agree with the plan. Patient's desire to return to the community is present, but is not able due to care needs . Information reviewed:  Medications, vital signs, orders, and nursing notes.    ROS:  4 point ROS including Respiratory, CV, GI and , other than that noted in the HPI,  is negative    Vitals:  BP (!) 185/86   Pulse 69   Temp 97.1  F (36.2  C)   Resp 18   Ht 1.549 m (5' 1\")   Wt 67.3 kg (148 lb 6.4 oz)   SpO2 95%   BMI 28.04 kg/m    Body mass index is 28.04 kg/m .  Exam:  GENERAL APPEARANCE: alert and bright   ENT:  Mouth and posterior oropharynx normal, dry mucous membranes  EYES:  EOM, conjunctivae WNL,  right eye esotropia, periorbital edema bilateral   RESP: LS grossly clear-no cough or wheeze with exam, some rhonchi at baseline (not today)  CV:  trace edema-no compression  ABDOMEN:  no guarding or rebound BS +  M/S:   kyphotic stance, some generalized weakness but at baseline, walks with SBA and " 2WW  SKIN:  intact to visualized areas-lesion on forehead is raised, dry and flaky, left nasal lesion-black lesion   NEURO:   Cranial nerves 2-12 are normal tested and grossly at patient's baseline  PSYCH: reduced insight, judgement and memory    Lab/Diagnostic data:   CBC RESULTS: Recent Labs   Lab Test 05/25/21  1105 04/30/21  0658   WBC 6.8 6.1   RBC 3.56* 3.43*   HGB 10.4* 10.2*   HCT 34.5* 33.1*   MCV 97 97   MCH 29.2 29.7   MCHC 30.1* 30.8*   RDW 13.2 13.3    208       Last Basic Metabolic Panel:  Recent Labs   Lab Test 05/25/21  1105 04/30/21  0658    141   POTASSIUM 3.6 3.7   CHLORIDE 104 103   NELSON 8.8 8.9   CO2 29 32*   BUN 24 24   CR 0.77 0.81    97       Liver Function Studies -   Recent Labs   Lab Test 02/02/21  1155   PROTTOTAL 6.6   ALBUMIN 3.3*   BILITOTAL 0.7   ALKPHOS 68   AST 19   ALT 13       TSH   Date Value Ref Range Status   02/02/2021 2.28 0.30 - 5.00 uIU/mL Final       No results found for: A1C    ASSESSMENT/PLAN  (F51.01) Primary insomnia  (primary encounter diagnosis)  Comment: reviewed black box warning and the list of all adverse and common reactions with both Ermelinda and her daughter including depression exacerbation, suicidality, hallucinations drowsiness, risk of falls, potential contributor to injury leading to death and they would like medication increased stating goals of care for quality over quantity of life  Plan:   -zolpidem (AMBIEN) 5 MG PO DAILY AT HS  -discontinue melatonin   -facility to monitor for increased sedation and other noted SE  -would like her to continue this medication for at least 2 weeks before possible trial of doxepin?     (J44.9) Chronic obstructive pulmonary disease, unspecified COPD type (H)    (J45.40) Moderate persistent asthma without complication  Comment: Noted on CXR of 5/21/21  Plan:   -adding tiotropium (SPIRIVA RESPIMAT) 2.5 MCG/ACT   -Proair 2 puff q4H prn   -Advair 1 puff every 12 hours prn         (I11.0) Hypertensive heart  disease with heart failure (H)  (I50.32) Chronic diastolic congestive heart failure (H)  (I10) Essential hypertension  (Z86.73) History of CVA (cerebrovascular accident)  (I25.10) Coronary artery disease involving native coronary artery without angina pectoris, unspecified whether native or transplanted heart  Comment: BP average 142/62 HR 67. Wt up 6 lbs? Does not appear in fluid overload   Plan:   -isosorbide mononitrate (IMDUR) ER from 30 mg po daily to 45 mg po daily at previous visit   -metoprolol 25 mg po BID  -losartan 75 mg po daily increased to 100 mg po daily at previous visit   -HCTZ 25 mg po every other day  -HR/BP monitoring from facility   -nitroglycerin prn  -clopidogrel 75 mg po daily     (Z87.440) Personal history of urinary tract infection   Comment: state concern her request to keep wipes on piping in bathroom  Plan:  -facility working on alternative plan  -would encourage her to accept assistance with [facility]ka cares for better hygiene outcomes contributing to UTIs            Electronically signed by:  AZAEL Martínez CNP                Sincerely,        AZAEL Martínez CNP

## 2021-10-08 NOTE — PROGRESS NOTES
[facility]  Chief Complaint   Patient presents with     long term Regulatory     [facility] Record Number:  [id number]  Place of Service where encounter took place:  [facility] () [[id number]]    HPI:    Ermelinda Pfeiffer  is 89 year old (10/19/1930), who is being seen today for a federally mandated E/M visit.  HPI information obtained from: facility chart records, facility staff and [facility] Epic chart review. Today's concerns are:    Resident and her daughter requested increase and scheduling of Ambien which started on 10/1 stating quality of life and goals of care. Longstanding history of insomnia (>4 decades) and [facility]. Hx of difficulty initiating and maintaining sleep for more than 40 years. She has tried and failed CBT, Lunesta, Sonata, Belsomra, Remeron, Trazodone and gabapentin. Daughter thinks improvement and she was up participating in activities on the unit today. Ermelinda says she is still struggling to fall back asleep after waking up in the middle of night. Staff say she still sleep much during the day.     HTN and CHF stable on current regimen. She has been resistant with using diuretic 2/2 increase need of urination. On low dose HCTZ.      History of UTIs and during state audit they found some concerns with storage of her wipes. She prefers to be independent with toileting cares and this could be contributor. Also with pessary in place.       ALLERGIES:Sulfamethoxazole-trimethoprim, Levofloxacin, Clarithromycin, Mirtazapine, and Penicillin g  PAST MEDICAL HISTORY:   has a past medical history of Acute sinus infection, Anemia, CAD (coronary artery disease), Cancer of the skin, basal cell, Cataract, Depressive disorder, Diplopia, Disturbance, sleep, GERD (gastroesophageal reflux disease), HTN (hypertension), Hyperlipidemia, Insomnia, Myocardial infarction (H), Osteoporosis, Skin cancer, Status post parathyroidectomy (05/23/2016), Stone,  kidney, and Transient ischemic attack.  PAST SURGICAL HISTORY:   has a past surgical history that includes Parathyroidectomy (1969) and PERCUTANEOUS TRANSLUMINAL BALLOON ANGIOPLASTY WITH INSERTION OF STENT INTO CORONARY ARTERY N/A  (10/29/1997).  FAMILY HISTORY: family history includes Suicide in her maternal aunt and maternal uncle.  SOCIAL HISTORY:  reports that she has quit smoking. She has a 3.25 pack-year smoking history. She has never used smokeless tobacco. She reports that she does not drink alcohol and does not use drugs.    MEDICATIONS:  Current Outpatient Medications   Medication Sig Dispense Refill     albuterol (PROAIR HFA/PROVENTIL HFA/VENTOLIN HFA) 108 (90 Base) MCG/ACT inhaler Inhale 2 puffs into the lungs every 4 hours as needed for shortness of breath / dyspnea or wheezing       clopidogrel (PLAVIX) 75 MG tablet Take 75 mg by mouth daily       escitalopram (LEXAPRO) 5 MG tablet Take 5 mg by mouth daily       ferrous fumarate 65 mg, [place]. FE,-Vitamin C 125 mg (VITRON C)  MG TABS tablet Take 1 tablet by mouth daily       fluticasone-salmeterol (ADVAIR) 500-50 MCG/DOSE inhaler Inhale 1 puff into the lungs every 12 hours for Asthma Rinse and spit after each use.       hydrochlorothiazide (HYDRODIURIL) 25 MG tablet Take 25 mg by mouth daily every other day for blood pressure and fluid managment       isosorbide mononitrate (IMDUR) 30 MG 24 hr tablet Take 1 tablet (30 mg) by mouth daily AND 0.5 tablets (15 mg) daily. 30 tablet 4     lidocaine (LIDODERM) 5 % patch Place 1 patch onto the skin daily as needed for moderate pain To prevent lidocaine toxicity, patient should be patch free for 12 hrs daily.       losartan (COZAAR) 50 MG tablet Take 2 tablets (100 mg) by mouth daily 30 tablet 1     metoprolol tartrate (LOPRESSOR) 25 MG tablet Take 1 tablet (25 mg) by mouth 2 times daily Hold for SBP <100 or HR <60. Goal -130       nitroGLYcerin (NITROSTAT) 0.4 MG sublingual tablet Place 0.4 mg under  "the tongue every 5 minutes as needed for chest pain For chest pain place 1 tablet under the tongue every 5 minutes for 3 doses. If symptoms persist 5 minutes after 1st dose call 911.       nystatin (MYCOSTATIN) 202924 UNIT/GM external powder Apply topically as needed       pantoprazole (PROTONIX) 40 MG EC tablet Take 40 mg by mouth daily for Esophagitis Before breakfast       rosuvastatin (CRESTOR) 5 MG tablet Take 5 mg by mouth every other day At Bedtime       SENNA-docusate sodium (SENNA S) 8.6-50 MG tablet Take 1 tablet by mouth 2 times daily as needed       tiotropium (SPIRIVA RESPIMAT) 2.5 MCG/ACT inhaler Inhale 2 puffs into the lungs daily 4 g 11     Vitamin D, Cholecalciferol, 25 MCG (1000 UT) TABS Take 1 tablet by mouth daily       zolpidem (AMBIEN) 5 MG tablet Take 1 tablet (5 mg) by mouth At Bedtime 30 tablet 1     Case Management:  I have reviewed the care plan and MDS and do agree with the plan. Patient's desire to return to the community is present, but is not able due to care needs . Information reviewed:  Medications, vital signs, orders, and nursing notes.    ROS:  4 point ROS including Respiratory, CV, GI and , other than that noted in the HPI,  is negative    Vitals:  BP (!) 185/86   Pulse 69   Temp 97.1  F (36.2  C)   Resp 18   Ht 1.549 m (5' 1\")   Wt 67.3 kg (148 lb 6.4 oz)   SpO2 95%   BMI 28.04 kg/m    Body mass index is 28.04 kg/m .  Exam:  GENERAL APPEARANCE: alert and bright   ENT:  Mouth and posterior oropharynx normal, dry mucous membranes  EYES:  EOM, conjunctivae WNL,  right eye esotropia, periorbital edema bilateral   RESP: LS grossly clear-no cough or wheeze with exam, some rhonchi at baseline (not today)  CV:  trace edema-no compression  ABDOMEN:  no guarding or rebound BS +  M/S:   kyphotic stance, some generalized weakness but at baseline, walks with SBA and 2WW  SKIN:  intact to visualized areas-lesion on forehead is raised, dry and flaky, left nasal lesion-black lesion "   NEURO:   Cranial nerves 2-12 are normal tested and grossly at patient's baseline  PSYCH: reduced insight, judgement and memory    Lab/Diagnostic data:   CBC RESULTS: Recent Labs   Lab Test 05/25/21  1105 04/30/21  0658   WBC 6.8 6.1   RBC 3.56* 3.43*   HGB 10.4* 10.2*   HCT 34.5* 33.1*   MCV 97 97   MCH 29.2 29.7   MCHC 30.1* 30.8*   RDW 13.2 13.3    208       Last Basic Metabolic Panel:  Recent Labs   Lab Test 05/25/21  1105 04/30/21  0658    141   POTASSIUM 3.6 3.7   CHLORIDE 104 103   NELSON 8.8 8.9   CO2 29 32*   BUN 24 24   CR 0.77 0.81    97       Liver Function Studies -   Recent Labs   Lab Test 02/02/21  1155   PROTTOTAL 6.6   ALBUMIN 3.3*   BILITOTAL 0.7   ALKPHOS 68   AST 19   ALT 13       TSH   Date Value Ref Range Status   02/02/2021 2.28 0.30 - 5.00 uIU/mL Final       No results found for: A1C    ASSESSMENT/PLAN  (F51.01) Primary insomnia  (primary encounter diagnosis)  Comment: reviewed black box warning and the list of all adverse and common reactions with both Ermelinda and her daughter including depression exacerbation, suicidality, hallucinations drowsiness, risk of falls, potential contributor to injury leading to death and they would like medication increased stating goals of care for quality over quantity of life  Plan:   -zolpidem (AMBIEN) 5 MG PO DAILY AT HS  -discontinue melatonin   -facility to monitor for increased sedation and other noted SE  -would like her to continue this medication for at least 2 weeks before possible trial of doxepin?     (J44.9) Chronic obstructive pulmonary disease, unspecified COPD type (H)    (J45.40) Moderate persistent asthma without complication  Comment: Noted on CXR of 5/21/21  Plan:   -adding tiotropium (SPIRIVA RESPIMAT) 2.5 MCG/ACT   -Proair 2 puff q4H prn   -Advair 1 puff every 12 hours prn         (I11.0) Hypertensive heart disease with heart failure (H)  (I50.32) Chronic diastolic congestive heart failure (H)  (I10) Essential  hypertension  (Z86.73) History of CVA (cerebrovascular accident)  (I25.10) Coronary artery disease involving native coronary artery without angina pectoris, unspecified whether native or transplanted heart  Comment: BP average 142/62 HR 67. Wt up 6 lbs? Does not appear in fluid overload   Plan:   -isosorbide mononitrate (IMDUR) ER from 30 mg po daily to 45 mg po daily at previous visit   -metoprolol 25 mg po BID  -losartan 75 mg po daily increased to 100 mg po daily at previous visit   -HCTZ 25 mg po every other day  -HR/BP monitoring from facility   -nitroglycerin prn  -clopidogrel 75 mg po daily     (Z87.440) Personal history of urinary tract infection   Comment: state concern her request to keep wipes on piping in bathroom  Plan:  -facility working on alternative plan  -would encourage her to accept assistance with [facility]ka cares for better hygiene outcomes contributing to UTIs            Electronically signed by:  AZAEL Martínez CNP

## 2021-10-30 PROBLEM — R09.02 HYPOXIA: Status: ACTIVE | Noted: 2021-01-01

## 2021-10-30 PROBLEM — I50.9 ACUTE CONGESTIVE HEART FAILURE, UNSPECIFIED HEART FAILURE TYPE (H): Status: ACTIVE | Noted: 2021-01-01

## 2021-10-30 NOTE — ED NOTES
[facility]  ED Nurse Handoff Report    ED Chief complaint: Respiratory Distress      ED Diagnosis:   Final diagnoses:   Hypoxia   Acute congestive heart failure, unspecified heart failure type (H)       Code Status: DNR / DNI (select cares)    Allergies:   Allergies   Allergen Reactions     Sulfamethoxazole-Trimethoprim Rash     Levofloxacin Other (See Comments)     Red streaks on the side of nose & red cheeks     Clarithromycin Unknown     Mirtazapine Other (See Comments)     Grogginess in the morning after taking     Penicillin G Other (See Comments)     Penicillin consult. May use penicillin and cephalosporin - 4/24/19        Patient Story:  Patient arrives by EMS.  Respiratory distress tonight.  Started on sulfa, not sure why.  Allergic to sulfa.  SOB, O2 sat 70s-80s.  Agitated, AMS; normally alert & oriented.    EMS applied Bipap; vomited x2 in to Bipap.  Arrives with oxymask  O2 sat on arrival 77% with mild-moderate distress.  2 duonebs, Solumedrol.  Gradual improvement.  Nitroglycerin drip started due to CHF, HTN.     Focused Assessment:   See above.    Treatments and/or interventions provided:   Medications   nitroGLYcerin 50 mg in D5W 250 mL (adult std) infusion CENTRAL (75 mcg/min Intravenous Rate/Dose Change 10/30/21 0320)   cefTRIAXone (ROCEPHIN) 1 g vial to attach to  mL bag for ADULTS or NS 50 mL bag for PEDS (has no administration in time range)   ondansetron (ZOFRAN) injection 4 mg (4 mg Intravenous Given 10/30/21 0134)   ipratropium - albuterol 0.5 mg/2.5 mg/3 mL (DUONEB) neb solution 3 mL (3 mLs Nebulization Given 10/30/21 0145)   methylPREDNISolone sodium succinate (solu-MEDROL) injection 125 mg (125 mg Intravenous Given 10/30/21 0138)       Patient's response to treatments and/or interventions:  Tolerating Bipap.  Alert & oriented.  Respiratory distress has significantly decreased.  O2 sat now 93-96%, respiratory rate 26.     To be done/followed up on inpatient unit:       Does this patient have any cognitive concerns?: Alert & oriented    Activity level - Baseline/Home:  Unknown  Activity Level - Current:   Unknown    Patient's Preferred language: English   Needed?: No    Isolation: None  Infection: Not Applicable  Patient tested for COVID 19 prior to admission: YES  Bariatric?: No    Vital Signs:   Vitals:    10/30/21 0305 10/30/21 0310 10/30/21 0315 10/30/21 0320   BP: 109/66 115/69 123/68    Pulse: 87 85 84 89   Resp: 18 16 23 12   Temp:       TempSrc:       SpO2: 95% 91% 96% 92%   Weight:       Height:           Cardiac Rhythm:Cardiac Rhythm: Sinus tachycardia    Was the PSS-3 completed:   Yes  What interventions are required if any?               Family Comments:  Requested for daughter to be contacted.   OBS brochure/video discussed/provided to patient/family: N/A              Name of person given brochure if not patient:               Relationship to patient:     For the majority of the shift this patient's behavior was Green.   Behavioral interventions performed were .    ED NURSE PHONE NUMBER:  [id number]       ]

## 2021-10-30 NOTE — ED TRIAGE NOTES
EMS arrival:    Patient arrives from [facility].     Started on sulfa yesterday.  Today SOB.  Noted to be allergic to sulfa.   O2 sat 70s-80s with AMS.  Attempted CPAP; patient vomited x2.  Arrives on oxymask.  Upon arrival to ED, O2 sat via oxymask is 77% with labored breathing, appears in mild-moderate distress.     EMS gave 1 duoneb & terbutaline.

## 2021-10-30 NOTE — PROGRESS NOTES
RECEIVING UNIT ED HANDOFF REVIEW    ED Nurse Handoff Report was reviewed by: Fernanda Allen RN on October 30, 2021 at 5:26 AM

## 2021-10-30 NOTE — H&P
[facility]    History and Physical  Hospitalist       Date of Admission:  10/30/2021  Date of Service (when I saw the patient): 10/30/21    Assessment & Plan   Ermelinda Pfeiffer is a 89 year old female who presents with respiratory distress    Acute hypoxic respiratory failure  Acute on chronic diastolic heart failure  CAD with hx multiple stent 1992, 1997  Hypertension  HLD  [plavix 75 mg daily, imdur 45 mg daily, losartan 100 mg daily, metoprolol 25 mg BID, rosuvastatin 5 mg every other day, hydrochlorothiazide 25 mg daily  * Most recent echo available with EF ~50%, PAP 54 mm Hg, valvular abnormalities.   *Admitted [facility] 4/2021 with similar presentation, BP was 230/105. Recent blood pressures at care facility have been 120-140 systolic.   *With acute SOB 11p (thought by care facility to be related to sulfa abx but appears to have received macrobid). O2 sats for EMS 70-80%s, eventually placed on CPAP in ED with improvement. Initially hypertensive 197/100, improved with NTG infusion. BNP 2,242. Troponin negative. Initial lactic 2.7->3.3. WBC 15.1. VBG initial with acute respiratory acidosis. CXR c/w interstitial pulmonary edema.   - given methylpred in ED, hold on further steroids  - telemetry   - serial troponins  - daily weights, I/Os  - give 40 mg IV x 1 now, then lasix 20 mg IV BID x 2 doses, then reassess  - continue metoprolol 25 mg BID  - continue losartan 100 mg daily, imdur 45 mg daily  - continue plavix  - echocardiogram   - cardiology consult    UTI  Leukocytosis  Recurrent UTIs  Hx uterine prolapse  Treated for UTI mid October with 10 day course of keflex. ongoign sx 10/26. Repeat UA positive and reportedly started on macrobid.   - ceftraxone 1g IV q24     Moderate to severe persistent asthma  [Albuterol prn, Advair BID, Spiriva 2 puffs in am]  - continue advair, spiriva  - albuterol prn  - given steroids in ED, hold on further as not wheezing    Hx L pontine ischemic CVA  2/201945  Living independently until 2019 after CVA. Recently moved to TCU, per notes is unhappy there.     GERD  [pantoprazole 40 mg daily]  - pantoprazole 40 mg PO daily    Insomnia  depression  [escitalopram 5 mg daily, ambien 5 mg at HS]  - resume meds with rec as able    COVID-19 negative    DVT Prophylaxis: Pneumatic Compression Devices  Code Status: DNR / DNI    Disposition: Expected discharge in 3-4 days     Wali Bravo MD  [phone] (P)  Text Page     Primary Care Physician   Mauricio Ortega    Chief Complaint   Acute shortness of breath    History is obtained from the patient and medical records    History of Present Illness   Ermelinda Pfeiffer is a 89 year old female who presents with shortness of breath.  She initially was on BiPAP so history is limited but I was able to talk to her a little bit after the BiPAP came off.  She does not remember the events for which she ended up coming to the hospital.  She did have a hospitalization at [facility] in April of this year with acute shortness of breath.  At that time her blood pressures were over 230 systolic.  No electrocardiogram was obtained at the time.  She apparently was at her nursing home on the day of presentation when she became acutely short of breath at about 11 PM.  There was some concern that she had a sulfur allergy and she took a sulfa antibiotic but she was actually prescribed Macrobid which is into sulfa.  EMS initially tried to put BiPAP on her but she vomited so they could not.  She arrived in the emergency department with sats in the 70%'s range.  In the emergency department she was given antiemetic and CPAP was placed with improvement.  She was on CPAP for several hours and was able to come off.  I was able to talk to her bed and she had 90 pain.  She denied shortness of breath at the time I was seeing her.  Her biggest complaint was insomnia as she has been unable to sleep for 3 to 4 weeks.  This is very  distressing to her.    Past Medical History    I have reviewed this patient's medical history and updated it with pertinent information if needed.   Past Medical History:   Diagnosis Date     Acute sinus infection      Anemia      CAD (coronary artery disease)      Cancer of the skin, basal cell      Cataract      Depressive disorder      Diplopia      Disturbance, sleep      GERD (gastroesophageal reflux disease)      HTN (hypertension)      Hyperlipidemia      Insomnia      Myocardial infarction (H)      Osteoporosis      Skin cancer      Status post parathyroidectomy 05/23/2016 1969: Partial parathyroidectomy for hyperparathyroidism History of hypercalcemia in setting of benign parathyroid tumor.  No recurrence of hypercalcemia since     Stone, kidney      Transient ischemic attack        Past Surgical History   I have reviewed this patient's surgical history and updated it with pertinent information if needed.  Past Surgical History:   Procedure Laterality Date     PARATHYROIDECTOMY  1969     PERCUTANEOUS TRANSLUMINAL BALLOON ANGIOPLASTY WITH INSERTION OF STENT INTO CORONARY ARTERY N/A   10/29/1997    Percutaneous transluminal coronary angioplasty and Stent       Prior to Admission Medications   Prior to Admission Medications   Prescriptions Last Dose Informant Patient Reported? Taking?   SENNA-docusate sodium (SENNA S) 8.6-50 MG tablet   Yes No   Sig: Take 1 tablet by mouth 2 times daily as needed   Vitamin D, Cholecalciferol, 25 MCG (1000 UT) TABS   Yes No   Sig: Take 1 tablet by mouth daily   albuterol (PROAIR HFA/PROVENTIL HFA/VENTOLIN HFA) 108 (90 Base) MCG/ACT inhaler   Yes No   Sig: Inhale 2 puffs into the lungs every 4 hours as needed for shortness of breath / dyspnea or wheezing   clopidogrel (PLAVIX) 75 MG tablet   Yes No   Sig: Take 75 mg by mouth daily   escitalopram (LEXAPRO) 5 MG tablet   Yes No   Sig: Take 5 mg by mouth daily   ferrous fumarate 65 mg, [place]. FE,-Vitamin C 125 mg (VITRON C)   MG TABS tablet   Yes No   Sig: Take 1 tablet by mouth daily   fluticasone-salmeterol (ADVAIR) 500-50 MCG/DOSE inhaler   Yes No   Sig: Inhale 1 puff into the lungs every 12 hours for Asthma Rinse and spit after each use.   hydrochlorothiazide (HYDRODIURIL) 25 MG tablet   Yes No   Sig: Take 25 mg by mouth daily every other day for blood pressure and fluid managment   isosorbide mononitrate (IMDUR) 30 MG 24 hr tablet   No No   Sig: Take 1 tablet (30 mg) by mouth daily AND 0.5 tablets (15 mg) daily.   lidocaine (LIDODERM) 5 % patch   Yes No   Sig: Place 1 patch onto the skin daily as needed for moderate pain To prevent lidocaine toxicity, patient should be patch free for 12 hrs daily.   losartan (COZAAR) 50 MG tablet   No No   Sig: Take 2 tablets (100 mg) by mouth daily   metoprolol tartrate (LOPRESSOR) 25 MG tablet   Yes No   Sig: Take 1 tablet (25 mg) by mouth 2 times daily Hold for SBP <100 or HR <60. Goal -130   nitroFURantoin macrocrystal-monohydrate (MACROBID) 100 MG capsule   No No   Sig: Take 1 capsule (100 mg) by mouth 2 times daily for 7 days   nitroGLYcerin (NITROSTAT) 0.4 MG sublingual tablet   Yes No   Sig: Place 0.4 mg under the tongue every 5 minutes as needed for chest pain For chest pain place 1 tablet under the tongue every 5 minutes for 3 doses. If symptoms persist 5 minutes after 1st dose call 911.   nystatin (MYCOSTATIN) 120103 UNIT/GM external powder   Yes No   Sig: Apply topically as needed   pantoprazole (PROTONIX) 40 MG EC tablet   Yes No   Sig: Take 40 mg by mouth daily for Esophagitis Before breakfast   rosuvastatin (CRESTOR) 5 MG tablet   Yes No   Sig: Take 5 mg by mouth every other day At Bedtime   tiotropium (SPIRIVA RESPIMAT) 2.5 MCG/ACT inhaler   No No   Sig: Inhale 2 puffs into the lungs daily   zolpidem (AMBIEN) 5 MG tablet   No No   Sig: Take 1 tablet (5 mg) by mouth At Bedtime      Facility-Administered Medications: None     Allergies   Allergies   Allergen Reactions      Sulfamethoxazole-Trimethoprim Rash     Levofloxacin Other (See Comments)     Red streaks on the side of nose & red cheeks     Clarithromycin Unknown     Mirtazapine Other (See Comments)     Grogginess in the morning after taking     Penicillin G Other (See Comments)     Penicillin consult. May use penicillin and cephalosporin - 4/24/19        Social History   I have reviewed this patient's social history and updated it with pertinent information if needed. Ermelinda Pfeiffer  reports that she has quit smoking. She has a 3.25 pack-year smoking history. She has never used smokeless tobacco. She reports that she does not drink alcohol and does not use drugs.    Family History   I have reviewed this patient's family history and updated it with pertinent information if needed.   Family History   Problem Relation Age of Onset     Suicide Maternal Aunt      Suicide Maternal Uncle      Glaucoma No family hx of      Macular Degeneration No family hx of        Review of Systems   The 10 point Review of Systems is negative other than noted in the HPI or here.     Physical Exam   Temp: 97.7  F (36.5  C) Temp src: Temporal BP: 129/75 Pulse: 87   Resp: 24 SpO2: 97 % O2 Device: BiPAP/CPAP Oxygen Delivery: 15 LPM  Vital Signs with Ranges  154 lbs 15.73 oz    Constitutional: alert, oriented and in no acute distress  Eyes: EOMI, pupils deviated medially bilaterally (baseline)  HEENT: OP clear  Respiratory: Coarse anteriorly  Cardiovascular: tachy, regular. 1-2+ edema.  GI: soft, nontender, nondistended, BS present  Lymph/Hematologic: no cervical LAD  Genitourinary: deferred  Skin: no rashes or lesions grossly  Musculoskeletal: no deformities or arthritis  Neurologic: CN II-XII, MCWILLIAMS  Psychiatric: appears depressed    Data   Data reviewed today:  I personally reviewed the EKG tracing showing NSR and the chest x-ray image(s) showing interstitial edema.  Recent Labs   Lab 10/30/21  0139   WBC 15.1*   HGB 12.9   MCV 99          POTASSIUM 3.6   CHLORIDE 104   CO2 29   BUN 27   CR 0.89   ANIONGAP 5   NELSON 8.9   *   ALBUMIN 3.5   PROTTOTAL 7.9   BILITOTAL 0.4   ALKPHOS 95   ALT 22   AST 21   TROPONIN <0.015       Recent Results (from the past 24 hour(s))   POC US CHEST B-SCAN    Impression    [facility] Procedure Note      Limited Bedside ED Ultrasound of Thorax:    PROCEDURE: PERFORMED BY: Dr. Monty Rosa MD  INDICATIONS/SYMPTOM:  dyspnea  PROBE: High frequency linear probe  BODY LOCATION: Chest  FINDINGS:  Images of both lung hemithoracies taken in 2D in multiple rib spaces        Right side:  Lung sliding artifact  Present     B-line:  Present diffusely    Left side:  Lung sliding artifact  Present     B-line:  Present diffusely  Heart:  Decreased overall LV contraction    INTERPRETATION: Diffuse B-lines and decreased cardiac contractility concerning for CHF.  IMAGE DOCUMENTATION: Images were archived to PACs system.       XR Chest Port 1 View    Narrative    EXAM: CHEST SINGLE VIEW PORTABLE  LOCATION: [facility]  DATE/TIME: 10/30/2021 1:40 AM    INDICATION: Shortness of breath.  COMPARISON: 05/21/2021.    FINDINGS: Mildly to moderately increased interstitial opacities in both lungs. The lungs are otherwise clear. Possible cardiomegaly. Atherosclerotic calcification in the thoracic aorta.      Impression    IMPRESSION: Mildly to moderately increased interstitial opacities in the lungs, most likely related to interstitial pulmonary edema.

## 2021-10-30 NOTE — CODE/RAPID RESPONSE
Sepsis Evaluation Progress Note    I was called to see Ermelinda Pfeiffer due to re-check lactic > 4.0. She is known to have an infection.     Physical Exam   Vital Signs:  Temp: 98.4  F (36.9  C) Temp src: Oral BP: 127/66 Pulse: 78   Resp: 23 SpO2: 96 % O2 Device: None (Room air) Oxygen Delivery: 4 LPM     General: Quite well appearing elderly female without obvious acute distress.  Mental Status: baseline mental status.     No abdominal pain or tenderness.     Data   Lactic Acid   Date Value Ref Range Status   10/30/2021 5.4 (HH) 0.7 - 2.0 mmol/L Final   10/30/2021 6.3 (HH) 0.7 - 2.0 mmol/L Final       Assessment & Plan   Lactic acid elevation in the setting of admission for acute hypoxic respiratory failure likely secondary to diastolic heart failure exacerbation.  On current exam the patient is off oxygen, off BiPAP, off nitroglycerin drip.  She tolerated a small amount of breakfast this morning.  She is overall nontoxic-appearing, has adequate mentation, and is producing urine.  Initial lactic in the emergency department 2.7 with elevation of 3.3 now 6.3.  She has been treated for urinary tract infection with a 10-day course of Keflex which was switched to Macrobid after repeat UA on 10/26/2021 concerning for infection.  By report urine from 10/26/2021 is growing E. Coli; test was performed in another healthcare system and I am unable to locate sensitivities.  We will repeat urinalysis and urine culture here.  Recheck lactic acid now, add procalcitonin, add VBG.    Repeat lactic 5.4, down from 6.3.  VBG has normalized.  Procalcitonin greater than 7.  She is on IV ceftriaxone.  Given the fact she is normotensive we will not give IV fluid.    Disposition: The patient will remain on the current unit. We will continue to monitor this patient closely.     AZAEL Goldman, CNP  Hospitalist Service, House Officer  [facility]     Text Page  Pager: [phone] d

## 2021-10-30 NOTE — PLAN OF CARE
Ermelinda was [facility] at 0615 She's alert, oriented, pleasant, hard of hearing. She was weaned from 6L to no oxygen upon admission. Lungs clear. Appears to have a safe swallow.     External catheter in place. No output since transfer. Reports history of urinary retention/hesitancy.    Nitroglycerin infusing upon admission at 80 mcg/min. Tele: a-fib CVR.    Denies discomfort. Bed alarm in place. Family updated.

## 2021-10-30 NOTE — CONSULTS
"CARDIOLOGY CONSULTATION  October 30, 2021    REQUESTING PROVIDER:  DANY Bravo MD    REASON FOR CONSULTATION: Acute dyspnea, CHF      HISTORY OF PRESENT ILLNESS:    Thank you for asking cardiology to see this 89-year-old female.  She is a nursing home resident and has history of CAD and hypertensive heart disease.  Hospitalization at [facility] with acute diastolic heart failure earlier this year.  LVEF was 50% in 2020.    She was brought to the ER early this morning with acute dyspnea.  She was nauseous and vomited as well.  En route to the ER she was given nebulizers by the paramedics.  She was markedly hypertensive, initial /100 mmHg with RA sat 78%.  Chest x-ray showed interstitial opacities in both lungs.  She had elevated lactic acid level.  She was placed on nitroglycerin drip and on BiPAP.  She recovered very quickly.    It is now 1 PM and the patient's room air O2 sat in the CICU is 97%.  She is eating lunch and has no apparent dyspnea.  She is very hard of hearing making communication challenging.    Social and family history were reviewed and are as stated in the HPI.        DIAGNOSTIC STUDIES:  Labs: Lactic acid 5.4, procalcitonin 7.14, troponin normal, creatinine 0.89, sodium 138, potassium 3.4  12-lead ECG: Sinus rhythm, anterior infarct, left axis, T wave abnormalities.  Echocardiogram: LVEF 50% in 2020.  Echocardiogram from this admission is pending      IMPRESSION:  1. Acute heart failure, unclear whether systolic or diastolic as we do not have a recent LVEF.  Likely triggered by marked hypertension.  Had similar admission earlier this year at [facility].  She is chronically on HCTZ, metoprolol, isosorbide, losartan.  She has had a remarkable turnaround today.  After only a few hours since admission, she is now on room air with O2 sat of 97%.  This suggests acute (probable) diastolic CHF in a patient with likely chronically elevated LVEDP and a very narrow margin between \"euvolemia\" and pulmonary " "edema.  2. History of CAD with previous PCI.  Troponin normal during this admission which does not suggest acute ischemic event.    RECOMMENDATIONS:    1. Switch IV nitroglycerin to her typical outpatient medications.  She does not appear to require additional diuresis at this time.   2. Echocardiogram was ordered and is pending (reassess LV function).  Even if she has LV dysfunction, she already is on appropriate medications (beta-blocker, ARB) as outpatient.  3. Would not pursue \"aggressive\" cardiac testing in this very fragile elderly patient.      I appreciate the opportunity to be part of this patient's care.  Please feel free to call me with any questions (pager #[phone]).      Nikolay House MD, [facility]        PHYSICAL EXAM:  Vitals: /66   Pulse 78   Temp 98.4  F (36.9  C) (Oral)   Resp 23   Ht 1.549 m (5' 1\")   Wt 70.3 kg (154 lb 15.7 oz)   SpO2 96%   BMI 29.28 kg/m      Intake/Output Summary (Last 24 hours) at 10/30/2021 1332  Last data filed at 10/30/2021 1230  Gross per 24 hour   Intake 100 ml   Output 800 ml   Net -700 ml     Vitals:    10/30/21 0140   Weight: 70.3 kg (154 lb 15.7 oz)     Constitutional: Slender elderly woman who appears comfortable.  She is very hard of hearing.  Alert and has no CP or respiratory distress.  Head: Normocephalic  Skin: Pale color.  Eyes: Prominent strabismus.    ENT:  Normal JVP.    Chest/Lungs: Moderately decreased bilaterally, I cannot appreciate rales or wheezing.  Cardiac:  Regular rhythm, normal S1 and S2.  No murmur, rub or gallop.    Abdomen:  Abdomen is soft, non-tender & not distended.  Extremities:  Radial pulses 1+ bilaterally. No lower extremity edema is present.   Back:  No CVA tenderness.     REVIEW OF SYSTEMS:  Review of system completed and negative with the exception of what was described in the HPI section.     CURRENT MEDICATIONS:    [START ON 10/31/2021] cefTRIAXone  1 g Intravenous Q24H     clopidogrel  75 mg Oral Daily     " escitalopram  5 mg Oral Daily     ferrous fumarate 65 mg ([place]. FE)-Vitamin C 125 mg  1 tablet Oral Daily     fluticasone-vilanterol  1 puff Inhalation Daily     furosemide  20 mg Intravenous Once     isosorbide mononitrate  45 mg Oral Daily     losartan  100 mg Oral Daily     metoprolol tartrate  25 mg Oral BID     pantoprazole  40 mg Oral Daily     rosuvastatin  5 mg Oral Every Other Day     sodium chloride (PF)  3 mL Intracatheter Q8H     umeclidinium  1 puff Inhalation Daily     Vitamin D3  25 mcg Oral Daily     zolpidem  5 mg Oral At Bedtime       ALLERGIES     Allergies   Allergen Reactions     Sulfamethoxazole-Trimethoprim Rash     Levofloxacin Other (See Comments)     Red streaks on the side of nose & red cheeks     Clarithromycin Unknown     Mirtazapine Other (See Comments)     Grogginess in the morning after taking     Penicillin G Other (See Comments)     Penicillin consult. 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  No recurrence of hypercalcemia since     Stone, kidney      Transient ischemic attack        PAST SURGICAL HISTORY:  Past Surgical History:   Procedure Laterality Date     PARATHYROIDECTOMY  1969     PERCUTANEOUS TRANSLUMINAL BALLOON ANGIOPLASTY WITH INSERTION OF STENT INTO CORONARY ARTERY N/A   10/29/1997    Percutaneous transluminal coronary angioplasty and Stent       FAMILY HISTORY:  Family History    Problem Relation Age of Onset     Suicide Maternal Aunt      Suicide Maternal Uncle      Glaucoma No family hx of      Macular Degeneration No family hx of        SOCIAL HISTORY:  Social History     Socioeconomic History     Marital status:      Spouse name: Not on file     Number of children: Not on file     Years of education: Not on file     Highest education level: Not on file   Occupational History     Not on file   Tobacco Use     Smoking status: Former Smoker     Packs/day: 0.25     Years: 13.00     Pack years: 3.25     Smokeless tobacco: Never Used   Substance and Sexual Activity     Alcohol use: Never     Drug use: Never     Sexual activity: Not on file   Other Topics Concern     Not on file   Social History Narrative     Not on file     Social Determinants of Health     Financial Resource Strain:      Difficulty of Paying Living Expenses:    Food Insecurity:      Worried About Running Out of Food in the Last Year:      Ran Out of Food in the Last Year:    Transportation Needs:      Lack of Transportation (Medical):      Lack of Transportation (Non-Medical):    Physical Activity:      Days of Exercise per Week:      Minutes of Exercise per Session:    Stress:      Feeling of Stress :    Social Connections:      Frequency of Communication with Friends and Family:      Frequency of Social Gatherings with Friends and Family:      Attends Yarsanism Services:      Active Member of Clubs or Organizations:      Attends Club or Organization Meetings:      Marital Status:    Intimate Partner Violence:      Fear of Current or Ex-Partner:      Emotionally Abused:      Physically Abused:      Sexually Abused:          Recent Lab Results:  Recent Labs   Lab 10/30/21  1033 10/30/21  0636 10/30/21  0139   WBC  --   --  15.1*   HGB  --   --  12.9   MCV  --   --  99   PLT  --   --  248   NA  --   --  138   POTASSIUM  --  3.4 3.6   CHLORIDE  --   --  104   CO2  --   --  29   BUN  --   --  27   CR  --   --  0.89    ANIONGAP  --   --  5   NELSON  --   --  8.9   GLC  --   --  192*   ALBUMIN  --   --  3.5   PROTTOTAL  --   --  7.9   BILITOTAL  --   --  0.4   ALKPHOS  --   --  95   ALT  --   --  22   AST  --   --  21   TROPONIN <0.015  --  <0.015

## 2021-10-30 NOTE — PROGRESS NOTES
A&O x4. VSS on room air. Tele - Sinus Dysrhythmias. Denies CP/SOB/pain. Up w/ assist x2 with gait belt & walker. Ambulated in salazar this evening w/ assist x1.  Lasix given, Purewick in place. Cardiology consulted. Echo performed. chest CT, blood & urine cultures, UA, & labs performed to rule out sepsis. Plan to continue antibiotics for UTI and recheck procalcitonin and lactic acid.

## 2021-10-30 NOTE — PROVIDER NOTIFICATION
MD Notification    Notified Person: MD    Notified Person Name: Dr. Salazar     Notification Date/Time: 10/30 1101    Notification Interaction: text page     Purpose of Notification: Lactic recheck 6.3 so RRT called. Pt vitally stable. House NP ordered repeat lactic, a UA, procal, and VBGs. Lactic came back and is improving, now 5.4. Any more interventions? Also daughter bedside now if you wanted to speak with her. thanks     Orders Received:    Comments:    DATE:  10/30/2021   TIME OF RECEIPT FROM LAB: 1100  LAB TEST: lactic  LAB VALUE:  5.4  RESULTS GIVEN WITH READ-BACK TO (PROVIDER):  Dr. Salazar   TIME LAB VALUE REPORTED TO PROVIDER: 1101

## 2021-10-30 NOTE — PROVIDER NOTIFICATION
DATE:  10/30/2021   TIME OF RECEIPT FROM LAB: 1126  LAB TEST:  procal   LAB VALUE:  7.14  RESULTS GIVEN WITH READ-BACK TO (PROVIDER):  Dr. Salazar  TIME LAB VALUE REPORTED TO PROVIDER:  113

## 2021-10-30 NOTE — PHARMACY-ADMISSION MEDICATION HISTORY
Pharmacy Medication History  Admission medication history interview status for the 10/30/2021  admission is complete. See EPIC admission navigator for prior to admission medications     Location of Interview: Phone  Medication history sources: Patient and Nursing home ( [facility])     Significant changes made to the medication list:  Deleted:   Albuterol inhaler-discontinued by provider at [facility].       Additional medication history information:   The patient is a resident at [facility]. I called the facility and was provided a MAR with last doses. All changes to the medication list are listed above. Of note, Nitroglycerin was also discontinued by the nursing home. However, I kept this on the list.     Most recently, the patient was placed on Macrobid for 7 days for a UTI, with 10/29 being day 1. Prior to this, from 10/15/21 to 10/19/21, the patient was on Keflex 500 mg by mouth three times daily for UTI.       Medication reconciliation completed by provider prior to medication history? No    Time spent in this activity: 25 minutes    Prior to Admission medications    Medication Sig Last Dose Taking? Auth Provider   clopidogrel (PLAVIX) 75 MG tablet Take 75 mg by mouth daily 10/29/2021 at Unknown time Yes Reported, Patient   escitalopram (LEXAPRO) 5 MG tablet Take 5 mg by mouth daily 10/29/2021 at Unknown time Yes Reported, Patient   ferrous fumarate 65 mg, [place]. FE,-Vitamin C 125 mg (VITRON C)  MG TABS tablet Take 1 tablet by mouth daily 10/29/2021 at Unknown time Yes Reported, Patient   fluticasone-salmeterol (ADVAIR) 500-50 MCG/DOSE inhaler Inhale 1 puff into the lungs every 12 hours for Asthma Rinse and spit after each use. 10/29/2021 at Unknown time Yes Reported, Patient   hydrochlorothiazide (HYDRODIURIL) 25 MG tablet Take 25 mg by mouth daily every other day for blood pressure and fluid managment 10/29/2021 at Unknown time Yes Reported, Patient   isosorbide  mononitrate (IMDUR) 30 MG 24 hr tablet Take 1 tablet (30 mg) by mouth daily AND 0.5 tablets (15 mg) daily. 10/29/2021 at Unknown time Yes Hao Araujo APRN CNP   lidocaine (LIDODERM) 5 % patch Place 1 patch onto the skin daily as needed for moderate pain To prevent lidocaine toxicity, patient should be patch free for 12 hrs daily. prn at prn Yes Reported, Patient   losartan (COZAAR) 50 MG tablet Take 2 tablets (100 mg) by mouth daily 10/29/2021 at Unknown time Yes Hao Araujo APRN CNP   metoprolol tartrate (LOPRESSOR) 25 MG tablet Take 1 tablet (25 mg) by mouth 2 times daily Hold for SBP <100 or HR <60. Goal -130 10/29/2021 at Unknown time Yes Hao Araujo APRN CNP   nitroFURantoin macrocrystal-monohydrate (MACROBID) 100 MG capsule Take 1 capsule (100 mg) by mouth 2 times daily for 7 days 10/29/2021 at Unknown time Yes Hao Araujo APRN CNP   nitroGLYcerin (NITROSTAT) 0.4 MG sublingual tablet Place 0.4 mg under the tongue every 5 minutes as needed for chest pain For chest pain place 1 tablet under the tongue every 5 minutes for 3 doses. If symptoms persist 5 minutes after 1st dose call 911. prn at prn Yes Reported, Patient   nystatin (MYCOSTATIN) 582362 UNIT/GM external powder Apply topically as needed prn at prn Yes Reported, Patient   pantoprazole (PROTONIX) 40 MG EC tablet Take 40 mg by mouth daily for Esophagitis Before breakfast 10/29/2021 at Unknown time Yes Reported, Patient   rosuvastatin (CRESTOR) 5 MG tablet Take 5 mg by mouth every other day At Bedtime 10/29/2021 at Unknown time Yes Natalie Desai APRN CNP   SENNA-docusate sodium (SENNA S) 8.6-50 MG tablet Take 1 tablet by mouth 2 times daily as needed prn at prn Yes Reported, Patient   tiotropium (SPIRIVA RESPIMAT) 2.5 MCG/ACT inhaler Inhale 2 puffs into the lungs daily 10/29/2021 at Unknown time Yes Hao Araujo, APRN CNP   Vitamin D, Cholecalciferol, 25 MCG (1000 UT) TABS Take 2 tablets by mouth  daily  10/29/2021 at Unknown time Yes Reported, Patient   zolpidem (AMBIEN) 5 MG tablet Take 1 tablet (5 mg) by mouth At Bedtime 10/29/2021 at Unknown time Yes Hao Araujo APRN CNP       The information provided in this note is only as accurate as the sources available at the time of update(s) '

## 2021-10-30 NOTE — PROGRESS NOTES
[facility]    Medicine Progress Note - Hospitalist Service        Date of Admission:  10/30/2021  1:29 AM    Assessment & Plan:   Ermelinda Pfeiffer is a 89 year old female who presents with respiratory distress.     Acute hypoxic respiratory failure  Acute on chronic diastolic heart failure  CAD with hx multiple stents in 1992, 1997  Hypertension.  Tricuspid regurgitation  History of atrial fibrillation not on anticoagulation  HLD  [plavix 75 mg daily, imdur 45 mg daily, losartan 100 mg daily, metoprolol 25 mg BID, rosuvastatin 5 mg every other day, hydrochlorothiazide 25 mg daily.  * Most recent echo available with EF ~50%, PAP 54 mm Hg, valvular abnormalities.   *Admitted [facility] 4/2021 with similar presentation, BP was 230/105. Recent blood pressures at care facility have been 120-140 systolic.   *With acute SOB (thought by care facility to be related to sulfa abx but appears to have received macrobid). O2 sats for EMS 70-80%s, eventually placed on CPAP in ED with improvement. Initially hypertensive 197/100, improved with NTG infusion. BNP 2,242. Troponin negative. Initial lactic 2.7->3.3. WBC 15.1. VBG initial with acute respiratory acidosis. CXR c/w interstitial pulmonary edema.   -given methylpred in ED, hold on further steroids as she is not wheezy  -Respiratory status significantly improved, currently weaned off oxygen and saturating well on room air  -Serial troponins negative.  -Received 40 mg of IV Lasix earlier this morning, respiratory status much improved.  We will give her additional 20 mg later this evening and then reassess tomorrow  -Wean off nitroglycerin drip  -continue metoprolol 25 mg BID  -continue losartan 100 mg daily, imdur 45 mg daily  -continue plavix  -Echocardiogram today     UTI  Leukocytosis  Recurrent UTIs  Treated for UTI mid October with 10 day course of keflex.  Continues to have ongoing urinary symptoms. Repeat UA positive and reportedly started on macrobid.    -Continue ceftriaxone 1 g IV daily    Elevated lactic acid  -Etiology unclear, lactic acid worsened to 6.3 this morning.  -Patient clinically appears very stable, I do not have a good explanation for elevated lactic acid  -Repeat UA, procalcitonin and repeat lactic acid.  Given concern for CHF I would not give her any IV fluids, doubt she has sepsis.  Treat UTI as above.     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 Recently moved to TCU.  -Continue prior to admission Plavix     GERD  [pantoprazole 40 mg daily]  -Continue pantoprazole 40 mg PO daily     Insomnia  depression  [escitalopram 5 mg daily, ambien 5 mg at HS]  -Continue prior to admission Lexapro       Diet: Combination Diet Low Saturated Fat Diet; No Caffeine for 24 hours (once tests completed, may have caffeine)     DVT Prophylaxis: Pneumatic Compression Devices   Lemus Catheter: Not present  Code Status: No CPR- Do NOT Intubate     Disposition Plan    Expected discharge: 1-2 days pending clinical course  Entered: Shan Salazar MD 10/30/2021, 11:07 AM        The patient's care was discussed with the Bedside Nurse and Patient.    Shan Salazar MD  Hospitalist Service  [facility]  Text Page 7AM-6PM  Securely message with the Vocera Web Console (learn more here)  Text page via [employer] Paging/Directory    ______________________________________________________________________    Interval History   Dyspnea much improved, now weaned off oxygen and on room air.  Denies chest pain.  No cough.  Afebrile.     Data reviewed today: I reviewed all medications, new labs and imaging results over the last 24 hours. I personally reviewed no images or EKG's today.    Physical Exam   Vital signs:  Temp: 98.4  F  "(36.9  C) Temp src: Oral BP: 127/66 Pulse: 78   Resp: 23 SpO2: 96 % O2 Device: None (Room air) Oxygen Delivery: 4 LPM Height: 154.9 cm (5' 1\") Weight: 70.3 kg (154 lb 15.7 oz)  Estimated body mass index is 29.28 kg/m  as calculated from the following:    Height as of this encounter: 1.549 m (5' 1\").    Weight as of this encounter: 70.3 kg (154 lb 15.7 oz).      Wt Readings from Last 2 Encounters:   10/30/21 70.3 kg (154 lb 15.7 oz)   10/08/21 67.3 kg (148 lb 6.4 oz)       Gen: AAOX2-3, NAD  HEENT: Supple neck, moist oral mucosa, no pallor  Resp: Few crackles bilaterally, normal effort of breathing  CVS: RRR, systolic murmur in the left parasternal area  Abd/GI: Soft, non-tender. BS- normoactive.    Skin: Warm, dry no rashes  MSK: no pedal edema  Neuro- CN- intact. No focal deficits.        Data   Recent Labs   Lab 10/30/21  1033 10/30/21  0636 10/30/21  0139   WBC  --   --  15.1*   HGB  --   --  12.9   MCV  --   --  99   PLT  --   --  248   NA  --   --  138   POTASSIUM  --  3.4 3.6   CHLORIDE  --   --  104   CO2  --   --  29   BUN  --   --  27   CR  --   --  0.89   ANIONGAP  --   --  5   NELSON  --   --  8.9   GLC  --   --  192*   ALBUMIN  --   --  3.5   PROTTOTAL  --   --  7.9   BILITOTAL  --   --  0.4   ALKPHOS  --   --  95   ALT  --   --  22   AST  --   --  21   TROPONIN <0.015  --  <0.015       Recent Results (from the past 24 hour(s))   POC US CHEST B-SCAN    Impression    [facility] Procedure Note      Limited Bedside ED Ultrasound of Thorax:    PROCEDURE: PERFORMED BY: Dr. Monty Rosa MD  INDICATIONS/SYMPTOM:  dyspnea  PROBE: High frequency linear probe  BODY LOCATION: Chest  FINDINGS:  Images of both lung hemithoracies taken in 2D in multiple rib spaces        Right side:  Lung sliding artifact  Present     B-line:  Present diffusely    Left side:  Lung sliding artifact  Present     B-line:  Present diffusely  Heart:  Decreased overall LV contraction    INTERPRETATION: Diffuse B-lines and " decreased cardiac contractility concerning for CHF.  IMAGE DOCUMENTATION: Images were archived to PACs system.       XR Chest Port 1 View    Narrative    EXAM: CHEST SINGLE VIEW PORTABLE  LOCATION: [facility]  DATE/TIME: 10/30/2021 1:40 AM    INDICATION: Shortness of breath.  COMPARISON: 05/21/2021.    FINDINGS: Mildly to moderately increased interstitial opacities in both lungs. The lungs are otherwise clear. Possible cardiomegaly. Atherosclerotic calcification in the thoracic aorta.      Impression    IMPRESSION: Mildly to moderately increased interstitial opacities in the lungs, most likely related to interstitial pulmonary edema.      Medications     - MEDICATION INSTRUCTIONS -       Continuing ACE inhibitor/ARB/ARNI from home medication list OR ACE inhibitor/ARB order already placed during this visit       - MEDICATION INSTRUCTIONS -       nitroGLYcerin 30 mcg/min (10/30/21 0840)       [START ON 10/31/2021] cefTRIAXone  1 g Intravenous Q24H     clopidogrel  75 mg Oral Daily     escitalopram  5 mg Oral Daily     ferrous fumarate 65 mg ([place]. FE)-Vitamin C 125 mg  1 tablet Oral Daily     fluticasone-vilanterol  1 puff Inhalation Daily     isosorbide mononitrate  45 mg Oral Daily     losartan  100 mg Oral Daily     metoprolol tartrate  25 mg Oral BID     pantoprazole  40 mg Oral Daily     rosuvastatin  5 mg Oral Every Other Day     sodium chloride (PF)  3 mL Intracatheter Q8H     umeclidinium  1 puff Inhalation Daily     Vitamin D3  25 mcg Oral Daily     zolpidem  5 mg Oral At Bedtime

## 2021-10-30 NOTE — PROVIDER NOTIFICATION
DATE:  10/30/2021   TIME OF RECEIPT FROM CHARGE RN: 1000  LAB TEST:  Lactic   LAB VALUE:  6.3  RESULTS GIVEN WITH READ-BACK TO (PROVIDER): RRT called since lactic >4     TIME LAB VALUE REPORTED TO PROVIDER: 1002

## 2021-10-30 NOTE — PROVIDER NOTIFICATION
MD Notification    Notified Person: MD    Notified Person Name: Dr. Salazar     Notification Date/Time: 10/30 1520    Notification Interaction: text page     Purpose of Notification: Daughter wanted me to let you know pt is not on blood thinners because she is a falls risk. Both daughters want an update on results of CT scan and pt lab work, daughter sg is bedside otherwise you can call either one of them. thanks

## 2021-10-30 NOTE — ED PROVIDER NOTES
History   Chief Complaint:  Respiratory Distress     The history is provided by the patient, medical records, the EMS personnel and the nursing home.      Ermelinda Pfeiffer is a 89 year old female with history of CAD, hypertension, atrial flutter, MI, CHF, transient ischemic attack, and acute respiratory failure who presents via EMS with wheezing and difficulty breathing that was believed to be a reaction to sulfa drugs taken yesterday for UTI. EMS reports they were able to raise the patient's oxygen sats to the mid 90s on high flow oxygen en route, but she was still wheezing. They gave 2 in line nebs en route. Also tried CPAP, which improved breathing until she vomited twice into the CPAP. She is usually alert and oriented, but tonight is agitated. The patient states it is difficult to breath and that she feels very cold.       Review of Systems   Constitutional: Negative for fever.   Respiratory: Positive for shortness of breath and wheezing.    Cardiovascular: Negative for chest pain.   Gastrointestinal: Positive for vomiting. Negative for abdominal pain.   Neurological: Positive for tremors.   All other systems reviewed and are negative.    Allergies:  Sulfamethoxazole-Trimethoprim  Levofloxacin  Clarithromycin  Mirtazapine  Penicillin G    Medications:  Advair diskus  Albuterol inhaler  Plavix  Lexapro  Nitrostat  Crestor  Lopressor  Cozaar  Ambien  Hydrodiuril  Vitron-C  Imdur  hydrochlorothiazide  Protonix    Past Medical History:     Acute sinus infection  Anemia  CAD  Cancer of the skin, basal cell  Cataract  Depressive disorder  Diplopia  Sleep disturbance  GERD  HTN  Hyperlipidemia  Insomnia  MI  Osteoporosis  Kidney stone  Transient ischemic attack  Hypertensive heart disease with heart failure  Asthma  Chronic diastolic congestive heart failure  Repeated falls  Atrial flutter  Abducens nerve palsy  Atherosclerosis of aorta  Female genital prolapse  Recurrent UTI  SNHL, bilateral  Urge incontinence of  urine  Adjustment disorder with anxiety    Candidiasis  Acute respiratory failure with hypoxia  Atrial fibrillation  Pneumonia  Esotropia monocular right  Chronic fatigue  Keratosis actinic    Past Surgical History:    Parathyroidectomy  Percutaneous transluminal balloon angioplasty with insertion of stent into coronary artery     Social History:  The patient presents to the ED via EMS.   The patient lives at [facility].     Physical Exam     Patient Vitals for the past 24 hrs:   BP Temp Temp src Pulse Resp SpO2 Height Weight   10/30/21 0440 -- -- -- 81 23 96 % -- --   10/30/21 0435 123/74 -- -- 82 23 97 % -- --   10/30/21 0430 126/77 -- -- 83 23 96 % -- --   10/30/21 0425 104/64 -- -- 85 26 97 % -- --   10/30/21 0420 121/73 -- -- 81 23 95 % -- --   10/30/21 0415 108/67 -- -- 80 25 97 % -- --   10/30/21 0410 129/75 -- -- 87 24 97 % -- --   10/30/21 0405 132/76 -- -- 87 20 97 % -- --   10/30/21 0400 120/75 -- -- 81 25 96 % -- --   10/30/21 0355 111/69 -- -- 81 25 96 % -- --   10/30/21 0350 136/78 -- -- 86 22 96 % -- --   10/30/21 0345 121/74 -- -- 80 24 96 % -- --   10/30/21 0340 106/64 -- -- 81 25 96 % -- --   10/30/21 0335 114/66 -- -- 81 26 98 % -- --   10/30/21 0330 122/71 -- -- 82 23 97 % -- --   10/30/21 0325 128/74 -- -- 84 24 96 % -- --   10/30/21 0320 126/71 -- -- 89 12 92 % -- --   10/30/21 0315 123/68 -- -- 84 23 96 % -- --   10/30/21 0310 115/69 -- -- 85 16 91 % -- --   10/30/21 0305 109/66 -- -- 87 18 95 % -- --   10/30/21 0300 120/70 -- -- 84 24 95 % -- --   10/30/21 0255 130/70 -- -- 86 29 94 % -- --   10/30/21 0250 106/66 -- -- 84 25 94 % -- --   10/30/21 0245 94/58 -- -- 85 26 95 % -- --   10/30/21 0240 100/58 -- -- 85 23 94 % -- --   10/30/21 0235 125/71 -- -- 87 27 93 % -- --   10/30/21 0230 108/59 -- -- 88 24 94 % -- --   10/30/21 0225 108/59 -- -- 88 25 94 % -- --   10/30/21 0220 120/68 -- -- 89 27 93 % -- --   10/30/21 0215 122/71 -- -- 93 26 93 % -- --   10/30/21 0210 135/75  "-- -- 89 24 94 % -- --   10/30/21 0205 (!) 140/81 -- -- 92 26 96 % -- --   10/30/21 0200 (!) 141/82 -- -- 98 27 95 % -- --   10/30/21 0155 (!) 174/85 -- -- 90 (!) 31 97 % -- --   10/30/21 0150 (!) 193/94 -- -- 97 (!) 34 97 % -- --   10/30/21 0145 -- -- -- 96 27 96 % -- --   10/30/21 0140 (!) 197/100 97.7  F (36.5  C) Temporal 105 (!) 32 (!) 77 % 1.549 m (5' 1\") 70.3 kg (154 lb 15.7 oz)   10/30/21 0135 -- -- -- 94 (!) 38 (!) 83 % -- --   10/30/21 0130 -- -- -- 98 22 (!) 78 % -- --       Physical Exam  Head: No signs of trauma.   Mouth/Throat: Oropharynx is clear and moist.   CV: Mild tachycardia and regular rhythm.    Resp:  Dyspneic with decreased breath sounds and increased work of breathing.  GI: Soft. There is no tenderness.  No rebound or guarding.  Normal bowel sounds.    MSK: Normal range of motion.  No Calf tenderness.  Neuro: The patient is alert and oriented. Speech normal.  Skin: Skin is warm and dry. No rash noted.   Psych: normal mood and affect. behavior is normal.       Emergency Department Course   ECG  ECG obtained at 0135, ECG read at 0136  Sinus tachycardia with frequent and consecutive premature ventricular complexes. Septal infarct, age undetermined. AT & T wave abnormality, consider lateral ischemia. Abnormal ECG.    Rate 101 bpm. ND interval 184 ms. QRS duration 72 ms. QT/QTc 370/479 ms. P-R-T axes 85 -26 75.     Imaging:  XR Chest Port 1 View   Final Result   IMPRESSION: Mildly to moderately increased interstitial opacities in the lungs, most likely related to interstitial pulmonary edema.       POC US CHEST B-SCAN   Final Result   [facility] Procedure Note        Limited Bedside ED Ultrasound of Thorax:      PROCEDURE: PERFORMED BY: Dr. Monty Rosa MD   INDICATIONS/SYMPTOM:  dyspnea   PROBE: High frequency linear probe   BODY LOCATION: Chest   FINDINGS:   Images of both lung hemithoracies taken in 2D in multiple rib spaces          Right side:  Lung sliding artifact  Present "      B-line:  Present diffusely     Left side:  Lung sliding artifact  Present      B-line:  Present diffusely   Heart:  Decreased overall LV contraction      INTERPRETATION: Diffuse B-lines and decreased cardiac contractility concerning for CHF.   IMAGE DOCUMENTATION: Images were archived to PACs system.              Report per radiology and myself.    Laboratory:  Labs Ordered and Resulted from Time of ED Arrival to Time of ED Departure   COMPREHENSIVE METABOLIC PANEL - Abnormal       Result Value    Sodium 138      Potassium 3.6      Chloride 104      Carbon Dioxide (CO2) 29      Anion Gap 5      Urea Nitrogen 27      Creatinine 0.89      Calcium 8.9      Glucose 192 (*)     Alkaline Phosphatase 95      AST 21      ALT 22      Protein Total 7.9      Albumin 3.5      Bilirubin Total 0.4      GFR Estimate 57 (*)    NT PROBNP INPATIENT - Abnormal    N terminal Pro BNP Inpatient 2,242 (*)    CBC WITH PLATELETS AND DIFFERENTIAL - Abnormal    WBC Count 15.1 (*)     RBC Count 4.19      Hemoglobin 12.9      Hematocrit 41.5      MCV 99      MCH 30.8      MCHC 31.1 (*)     RDW 13.0      Platelet Count 248      % Neutrophils 90      % Lymphocytes 7      % Monocytes 2      % Eosinophils 1      % Basophils 0      % Immature Granulocytes 0      NRBCs per 100 WBC 0      Absolute Neutrophils 13.5 (*)     Absolute Lymphocytes 1.1      Absolute Monocytes 0.3      Absolute Eosinophils 0.1      Absolute Basophils 0.0      Absolute Immature Granulocytes 0.1 (*)     Absolute NRBCs 0.0     ISTAT GASES LACTATE VENOUS POCT - Abnormal    Lactic Acid POCT 2.7 (*)     Bicarbonate Venous POCT 34 (*)     O2 Sat, Venous POCT 17 (*)     pCO2V Venous POCT 77 (*)     pH Venous POCT 7.26 (*)     pO2 Venous POCT 17 (*)    ISTAT GASES LACTATE VENOUS POCT - Abnormal    Lactic Acid POCT 3.3 (*)     Bicarbonate Venous POCT 33 (*)     O2 Sat, Venous POCT 39 (*)     pCO2V Venous POCT 69 (*)     pH Venous POCT 7.29 (*)     pO2 Venous POCT 26     COVID-19  VIRUS (CORONAVIRUS) BY PCR - Normal    SARS CoV2 PCR Negative     TROPONIN I - Normal    Troponin I <0.015     BLOOD CULTURE   BLOOD CULTURE            Emergency Department Course:  Reviewed:  I reviewed nursing notes, vitals, past medical history, Care Everywhere and [facility]    Assessments:  0127 I obtained history and examined the patient as noted above.   0230 I rechecked the patient and explained findings.     Consults:  0240 I spoke with Dr. Bravo, the Hospitalist, who accepted the patient.     Interventions:  0134 Zofran 4mg IV  0138 Solu-Medrol 125mg IV  0145 DuoNeb 3mL nebulization  0156 Nitroglycerin 50mcg/min IV  0205 Nitroglycerin 60mcg/min rate change IV  0210 Nitroglycerin 70mcg/ min rate change IV  0237 Nitroglycerin 80mcg/min rate change IV  0247 Nitroglycerin 60mcg/min rate change IV  0255 Nitroglycerin 70mcg/min IV  0313 Nitroglycerin 70mcg/min verify dose IV  0320 Nitroglycerin 75mcg/min rate change IV    Disposition:  The patient was admitted to the hospital under the care of Dr. Bravo.     Impression & Plan     Medical Decision Making:  Ermelinda Pfeiffer is a 89-year-old woman presents due to difficulty breathing.  She lives in a nursing home and was apparently found to be dyspneic and hypoxic.  EMS reported patient being given sulfa antibiotic despite her being allergic to sulfa.  On chart review, the patient had been seen for urinary tract infection and the been prescribed Macrobid which is not a sulfa antibiotic.  Clinically the patient did have increased work of breathing with tachypnea and decreased air movement.  She is not having a rash or any swelling or other signs of allergic reaction or anaphylaxis.  Patient initially was given breathing treatment and Solu-Medrol.  She was hypoxic on arrival but when she started BiPAP her O2 sats did improve and her work of breathing improved.  I did do a bedside ultrasound which did show diffuse B-lines throughout the lung fields along with what  appeared to be decreased ejection fraction.  Given this, I had high concerns for CHF as the etiology of her symptoms and the patient started on a nitro drip for hypertension.  I did give a dose of ceftriaxone to cover patient's urinary tract infection as it was pansensitive on the culture.  Patient was admitted to the hospital service for continued respiratory support and treatment.    Critical Care Time: was 40 minutes for this patient excluding procedures    Diagnosis:    ICD-10-CM    1. Hypoxia  R09.02    2. Acute congestive heart failure, unspecified heart failure type (H)  I50.9      Scribe Disclosure:  I, Ebony Umanzor, am serving as a scribe at 1:30 AM on 10/30/2021 to document services personally performed by Monty Rosa MD based on my observations and the provider's statements to me.      Monty Rosa MD  10/30/21 0645

## 2021-10-31 NOTE — PROGRESS NOTES
SPIRITUAL HEALTH SERVICES  [facility] CARDIAC INTENSIVE CARE UNIT   ON-CALL VISIT    REFERRAL SOURCE: Pt family and staff     Pt awake and alert x 3. Pt's dtr Rosalba supportively at her side. Pt exhausted after PT, but yet receptive to  visit. Pt acknowledges being Reformed Moravian and a member of [employer] in [place], [place]. Her rabbi would normally seek to be present, but she is living far home at [facility]. Pt and pt's dtr acknowledge that this has been a difficult place for her being away from family and feeling alone and uninterested in activities there. Pt acknowledged she'd prefer to be at home with her dtr, and dtr gently acknowledged too how much she and her siblings Wilber and Chelo would want this, but that this is very difficult if not impossible to manage on their own at home.     Pt appreciative of reading of a Psalm, of explicit acknowledgements valorizing her Moravian gurwinder and God's presence and care for her. Pt appreciated references to the Book of Fern and the powerful women Ermelinda and Fern whose stories are recorded there. Pt and dtr requested prayer that honors her Moravian gurwinder. Pt requested follow-up  support.      provided Japanese scripture reading, prayer, words of affirmation, and listening support.  team will follow-up with pt and family for emotional and spiritual care support.                                                                                                                                              Juaquin Vogel  Staff   Pager [phone]   Office [phone]

## 2021-10-31 NOTE — PROGRESS NOTES
A&O x4. VSS on room air. Tele SD. Denies CP/SOB/pain. Up A2 w/ gait belt and walker. Purewick in place while in bed, otherwise up to bedside commode. Ambulated in halls x2 with staff. Plan to continue IV ABX and repeat labs in AM, possible discharge back to [facility] tomorrow.

## 2021-10-31 NOTE — CONSULTS
Received standard order for Nutrition Education - Heart Failure - Dietitian to instruct patient on 2 gram sodium diet    Visited patient and daughter today   They tell me that patient has followed a low salt diet since the '70s and has good understanding of diet guidelines   Daughter also tells me that patient has slightly liberalized her salt restriction at home given her advanced age   They decline diet teaching or handouts today   Consult completed     Daughter is asking for vanilla Ensure this afternoon but due to nationwide shortage of this flavor, she is agreeable to chocolate instead     Maria Ines Vital RD, LD  Clinical Dietitian

## 2021-10-31 NOTE — PROVIDER NOTIFICATION
DATE:  10/31/2021   TIME OF RECEIPT FROM LAB:  0720  LAB TEST:  procal   LAB VALUE:  7.69  RESULTS GIVEN WITH READ-BACK TO (PROVIDER):  Dr. Salazar   TIME LAB VALUE REPORTED TO PROVIDER:  0722

## 2021-10-31 NOTE — PLAN OF CARE
OT: Per discussion with PT, patient has A with ADL/IADL at baseline. No need for OT eval here. PT is meeting patients needs. Completing order.

## 2021-10-31 NOTE — PROGRESS NOTES
10/31/21 1135   Quick Adds   Type of Visit Initial PT Evaluation   Living Environment   Living Environment Comments pt is facility resident, resides at [facility]   Self-Care   Usual Activity Tolerance moderate   Current Activity Tolerance fair   Activity/Exercise/Self-Care Comment Pt has assist with bed mobility, dtr reports baseline pt transfers mod I, has A x 1 with walker when outside of apartment. Assist with dressing, bathing, meds. Has not been receiving therapy    Disability/Function   Fall history within last six months yes   Number of times patient has fallen within last six months 1   Change in Functional Status Since Onset of Current Illness/Injury yes   General Information   Onset of Illness/Injury or Date of Surgery 10/30/21   Referring Physician Wali Bravo MD    Pertinent History of Current Problem (include personal factors and/or comorbidities that impact the POC) Pt admitted with SOB, CHF see chart for details   Existing Precautions/Restrictions fall   General Observations Dtr present, involved in cares   Cognition   Orientation Status (Cognition) oriented x 3   Cognitive Status Comments [place] uses pocket talker.    Posture    Posture Forward head position   Strength   Strength Comments Pt demonstrates impaired functional strength    Bed Mobility   Comment (Bed Mobility) mod A    Transfers   Transfer Safety Comments STS min A with FWW, difficulty with ant wt shift   Gait/Stairs (Locomotion)   Comment (Gait/Stairs) 3' eval, FWW, close CGA, flexed posture, NBOS, short step length    Balance   Balance Comments Impaired dynamic balance   Clinical Impression   Criteria for Skilled Therapeutic Intervention yes, treatment indicated   PT Diagnosis (PT) impaired IND with mobility from baseline   Influenced by the following impairments functional weakness, impaired balance, impaired activity tolerance   Functional limitations due to impairments see above   Clinical Presentation  Stable/Uncomplicated   Clinical Presentation Rationale clinical judgement, safe DC plan   Clinical Decision Making (Complexity) low complexity   Therapy Frequency (PT) 5x/week   Predicted Duration of Therapy Intervention (days/wks) 1 week   Planned Therapy Interventions (PT) balance training;bed mobility training;gait training;home exercise program;strengthening;patient/family education;transfer training   Risk & Benefits of therapy have been explained evaluation/treatment results reviewed;care plan/treatment goals reviewed;risks/benefits reviewed;patient   PT Discharge Planning    PT Discharge Recommendation (DC Rec) home with home care physical therapy;home with assist   PT Rationale for DC Rec Recommend pt returns to care facility at [facility], pt will require continued PT services at [facility] as pt currently below baseline for independence in transfers and gait. Requires A x 1 for transfers and gait with FWW, WC follow during ambulation, fatigues after ~ 50-60' distance.    Total Evaluation Time   Total Evaluation Time (Minutes) 15

## 2021-10-31 NOTE — PROVIDER NOTIFICATION
A&O x4. VSS on room air. Tele SD. Denies CP/SOB/pain. Up w/ A2 w/ gait belt and walker. Purewick in place. Plan to continue IV ABX and repeat labs in AM.

## 2021-10-31 NOTE — PLAN OF CARE
No acute changes overnight. Pt denies any pain  Neuro: A&O. Slept well. Afebrile  CV: sinus arrhythmia, normotensive  Resp: clear lung sounds, o2 sats >90% on room air, Pt denies SOB  GI: active bowel sounds, no BM  : purewick in place, adequate urine output    Margareth Schwartz RN on 10/31/2021 at 5:49 AM

## 2021-10-31 NOTE — PROGRESS NOTES
[facility]    Medicine Progress Note - Hospitalist Service        Date of Admission:  10/30/2021  1:29 AM    Assessment & Plan:   Ermelinda Pfeiffer is a 89 year old female who presents with respiratory distress.     Acute hypoxic respiratory failure  Acute on chronic diastolic heart failure  CAD with hx multiple stents in 1992, 1997  Hypertension.  Tricuspid regurgitation  History of atrial fibrillation not on anticoagulation  HLD  [plavix 75 mg daily, imdur 45 mg daily, losartan 100 mg daily, metoprolol 25 mg BID, rosuvastatin 5 mg every other day, hydrochlorothiazide 25 mg daily.  * Most recent echo available with EF ~50%, PAP 54 mm Hg, valvular abnormalities.   *Admitted [facility] 4/2021 with similar presentation, BP was 230/105. Recent blood pressures at care facility have been 120-140 systolic.   *With acute SOB (thought by care facility to be related to sulfa abx but appears to have received macrobid). O2 sats for EMS 70-80%s, eventually placed on CPAP in ED with improvement. Initially hypertensive 197/100, improved with NTG infusion. BNP 2,242. Troponin negative. Initial lactic 2.7->3.3. WBC 15.1. VBG initial with acute respiratory acidosis. CXR c/w interstitial pulmonary edema.   -given methylpred in ED, hold on further steroids as she is not wheezy  -Respiratory status significantly improved, currently weaned off oxygen and saturating well on room air  -Serial troponins negative.  -Received IV Lasix yesterday, respiratory status is stable  -continue metoprolol 25 mg BID  -continue losartan 100 mg daily, imdur 45 mg daily  -continue plavix  -Echocardiogram yesterday shows EF of 60-65%, severely dilated left atrium, mild to moderate tricuspid regurgitation, it appears like echo is essentially unchanged from what was reported on outside echo from 7/2020  -No further scheduled diuretics at this time     UTI  Leukocytosis  Recurrent UTIs  Treated for UTI mid October with 10 day course of  keflex.  Continues to have ongoing urinary symptoms. Repeat UA positive and reportedly started on macrobid.   -Continue ceftriaxone 1 g IV daily  -Clinically she feels a lot better however leukocytosis is worse and procalcitonin is slightly higher, ?  Unclear why the objective data are not correlating with clinical situation.  -We will recheck procalcitonin tomorrow but I suspect it is elevated due to noninfectious cause    Elevated lactic acid  -Etiology unclear, lactic acid worsened yesterday however now has improved to normal.     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 Recently moved to TCU.  -Continue prior to admission Plavix     GERD  [pantoprazole 40 mg daily]  -Continue pantoprazole 40 mg PO daily     Insomnia  depression  [escitalopram 5 mg daily, ambien 5 mg at HS]  -Continue prior to admission Lexapro       Diet: Combination Diet Low Saturated Fat Diet; No Caffeine for 24 hours (once tests completed, may have caffeine)     DVT Prophylaxis: Pneumatic Compression Devices   Lemus Catheter: Not present  Code Status: No CPR- Do NOT Intubate     Disposition Plan    Expected discharge: Most likely 11/1, to long-term care facility  Entered: Shan Salazar MD 10/31/2021, 10:31 AM        The patient's care was discussed with the Bedside Nurse and Patient.  And her daughter at the bedside.    Shan Salazar MD  Hospitalist Service  [facility]  Text Page 7AM-6PM  Securely message with the Vocera Web Console (learn more here)  Text page via [employer] Paging/Directory    ______________________________________________________________________    Interval History   Respiratory status continues to be stable.  Not requiring any oxygen.  Denies dyspnea.  Overall feels  "much better.  Afebrile.  Leukocytosis slightly worse    Data reviewed today: I reviewed all medications, new labs and imaging results over the last 24 hours. I personally reviewed no images or EKG's today.    Physical Exam   Vital signs:  Temp: 97.5  F (36.4  C) Temp src: Oral BP: 139/69 Pulse: 80   Resp: 20 SpO2: 97 % O2 Device: None (Room air) Oxygen Delivery: 4 LPM Height: 154.9 cm (5' 1\") Weight: 69.5 kg (153 lb 4.8 oz)  Estimated body mass index is 28.97 kg/m  as calculated from the following:    Height as of this encounter: 1.549 m (5' 1\").    Weight as of this encounter: 69.5 kg (153 lb 4.8 oz).      Wt Readings from Last 2 Encounters:   10/31/21 69.5 kg (153 lb 4.8 oz)   10/08/21 67.3 kg (148 lb 6.4 oz)       Gen: AAOX2-3, NAD  HEENT: Supple neck, moist oral mucosa, no pallor  Resp: Few crackles bilaterally, normal effort of breathing  CVS: RRR, systolic murmur in the left parasternal area  Abd/GI: Soft, non-tender. BS- normoactive.    Skin: Warm, dry no rashes  MSK: no pedal edema  Neuro- CN- intact. No focal deficits.        Data   Recent Labs   Lab 10/31/21  0547 10/30/21  1033 10/30/21  0636 10/30/21  0139   WBC 17.9*  --   --  15.1*   HGB 9.6*  --   --  12.9   MCV 95  --   --  99     --   --  248     --   --  138   POTASSIUM 3.7  --  3.4 3.6   CHLORIDE 103  --   --  104   CO2 28  --   --  29   BUN 38*  --   --  27   CR 0.96  --   --  0.89   ANIONGAP 7  --   --  5   NELSON 8.2*  --   --  8.9   *  --   --  192*   ALBUMIN  --   --   --  3.5   PROTTOTAL  --   --   --  7.9   BILITOTAL  --   --   --  0.4   ALKPHOS  --   --   --  95   ALT  --   --   --  22   AST  --   --   --  21   TROPONIN  --  <0.015  --  <0.015       Recent Results (from the past 24 hour(s))   CT Chest w/o Contrast    Narrative    EXAM: CT CHEST WITHOUT CONTRAST  LOCATION: [facility]  DATE/TIME: 10/30/2021, 12:38 PM    INDICATION: Respiratory failure, heart failure.  COMPARISON: None.  TECHNIQUE: " CT chest without IV contrast. Multiplanar reformats were obtained. Dose reduction techniques were used.  CONTRAST: None.    FINDINGS:   LUNGS AND PLEURA: Tracheobronchomalacia is noted. Noncalcified 4 mm nodule left upper lobe (series 4, image 81). Dependent atelectatic changes noted in both lungs. There are small bilateral pleural effusions. Pulmonary vessels appear engorged. Mild   parabronchial thickening.    MEDIASTINUM/AXILLAE: Large hiatal hernia. Upper normal caliber of the thoracic aorta. Unremarkable thyroid. No pathologically enlarged thoracic or axillary lymph nodes.    CORONARY ARTERY CALCIFICATION: Severe.    UPPER ABDOMEN: No significant finding.    MUSCULOSKELETAL: Unremarkable.      Impression    IMPRESSION:   1.  Small bilateral pleural effusions, pulmonary vascular engorgement, and mild parabronchial thickening suggestive of CHF.  2.  Mild bibasilar atelectasis and significant tracheobronchomalacia.  3.  Large hiatal hernia.  4.  Incidental pulmonary nodule measuring 4 mm.  5.  Severe coronary atherosclerosis.    Recommendations for one or multiple incidental lung nodules < 6mm:  Low-Risk Patient: No routine follow-up.  High-Risk Patient: Optional follow-up CT at 12 months; if unchanged, no further follow-up.    *Low Risk: Minimal or absent history of smoking or other known risk factors.  *Nonsolid (ground-glass) or partly solid nodules may require longer follow-up to exclude indolent adenocarcinoma.  *Recommendations based on Guidelines for the Management of Incidental Pulmonary Nodules Detected at CT: From the Fleischner Society 2017, Radiology 2017.     Echocardiogram Complete   Result Value    LVEF  60-65%    Narrative    [id number]  [id number]  [id number]  [id number]^GEORGE^RONY^MNUIR     [facility]  Echocardiography Laboratory  [address] [place] [address]     Name: JEREMY DANIEL  MRN: [id number]  : 10/19/1930  Study Date: 10/30/2021 02:20 PM  Age: 89  yrs  Gender: Female  Patient Location: [facility]  Reason For Study: Heart Failure  Ordering Physician: RONY VAZQUEZ  Referring Physician: Mauricio Ortega  Performed By: Charles Melchor     BSA: 1.7 m2  Height: 61 in  Weight: 154 lb  HR: 71  BP: 148/78 mmHg  ______________________________________________________________________________  Procedure  Complete Portable Echo Adult.  ______________________________________________________________________________  Interpretation Summary     1. Left ventricular systolic function is normal. The visual ejection fraction  is 60-65%.  2. No regional wall motion abnormalities noted.  3. The right ventricle is normal in structure, function and size.  4. The left atrium is severely dilated.  5. There is moderate mitral annular calcification resulting in mild mitral  stenosis.  5. There is mild to moderate (1-2+) tricuspid regurgitation.  6. The ascending aorta is Mildly dilated, 4.1 cm.  7. No prior studies available for comparison.  ______________________________________________________________________________  Left Ventricle  The left ventricle is normal in size. There is mild concentric left  ventricular hypertrophy. Left ventricular systolic function is normal. The  visual ejection fraction is 60-65%. Diastolic Doppler findings (E/E' ratio  and/or other parameters) suggest left ventricular filling pressures are  indeterminate. No regional wall motion abnormalities noted.     Right Ventricle  The right ventricle is normal in structure, function and size.     Atria  The left atrium is severely dilated. The right atrium is severely dilated.     Mitral Valve  There is moderate mitral annular calcification. There is mild mitral stenosis.     Tricuspid Valve  The tricuspid valve is normal in structure and function. There is mild to  moderate (1-2+) tricuspid regurgitation.     Aortic Valve  The aortic valve is trileaflet with aortic valve sclerosis.     Pulmonic Valve  The  pulmonic valve is normal in structure and function. There is trace  pulmonic valvular regurgitation.     Vessels  Normal size aorta. The ascending aorta is Mildly dilated. IVC diameter <2.1 cm  collapsing >50% with sniff suggests a normal RA pressure of 3 mmHg.     Pericardium  There is no pericardial effusion.     Rhythm  Sinus rhythm was noted.  ______________________________________________________________________________  MMode/2D Measurements & Calculations  IVSd: 1.1 cm     LVIDd: 4.9 cm  LVIDs: 2.3 cm  LVPWd: 1.2 cm  FS: 52.5 %  LV mass(C)d: 215.3 grams  LV mass(C)dI: 127.4 grams/m2  Ao root diam: 3.4 cm  LA dimension: 3.9 cm  asc Aorta Diam: 4.2 cm  LA/Ao: 1.1  LVOT diam: 2.0 cm  LVOT area: 3.0 cm2  LA Volume (BP): 90.0 ml  LA Volume Index (BP): 53.3 ml/m2  RWT: 0.48     Doppler Measurements & Calculations  MV E max krys: 114.0 cm/sec  MV A max krys: 89.7 cm/sec  MV E/A: 1.3  MV max P.6 mmHg  MV mean P.4 mmHg  MV V2 VTI: 30.9 cm  MV dec slope: 530.4 cm/sec2  PA acc time: 0.11 sec  TR max krys: 287.8 cm/sec  TR max P.1 mmHg  E/E' av.2  Lateral E/e': 12.6  Medial E/e': 23.8     ______________________________________________________________________________  Report approved by: Karel Jolley 10/30/2021 03:25 PM           Medications     - MEDICATION INSTRUCTIONS -       Continuing ACE inhibitor/ARB/ARNI from home medication list OR ACE inhibitor/ARB order already placed during this visit       - MEDICATION INSTRUCTIONS -       nitroGLYcerin 30 mcg/min (10/30/21 0840)       cefTRIAXone  1 g Intravenous Q24H     clopidogrel  75 mg Oral Daily     escitalopram  5 mg Oral Daily     ferrous fumarate 65 mg ([place]. FE)-Vitamin C 125 mg  1 tablet Oral Daily     fluticasone-vilanterol  1 puff Inhalation Daily     isosorbide mononitrate  45 mg Oral Daily     losartan  100 mg Oral Daily     metoprolol tartrate  25 mg Oral BID     pantoprazole  40 mg Oral Daily     rosuvastatin  5 mg Oral Every Other  Day     sodium chloride (PF)  3 mL Intracatheter Q8H     umeclidinium  1 puff Inhalation Daily     Vitamin D3  25 mcg Oral Daily     zolpidem  5 mg Oral At Bedtime

## 2021-10-31 NOTE — PROGRESS NOTES
"CARDIOLOGY PROGRESS NOTE  Marce House MD    89-year-old female with history of CAD and hypertensive heart disease with chronic diastolic dysfunction.  Admitted with marked hypertension and acute pulmonary edema leading to acute respiratory failure.  With BiPAP treatment she had remarkable and rather quick recovery.  Within 12 hours she was back on room air with excellent O2 saturations.    She had a similar hospitalization at [facility] in the spring of 2021.      She has abnormal labs, including a procalcitonin and lactic acid, raising the question of infection.  The hospitalist service is working with her in that regard.    Echocardiography yesterday showed normal LV function, EF 60-65% with 1-2+ TR.  -----------------------------------------------------------------    ASSESSMENT:  1. Acute diastolic heart failure with pulmonary edema.  Likely caused by acute severe hypertension.  Quick improvement.  BP at the nursing home is typically fine on her current medications.    PLAN:  1. Resume PTA medications.  2. Sign off, call with further questions.    Total Time: 25 minutes;   10 minutes spent in direct communication with patient / family and care coordination.             Interval History:     She is feeling well          Review of Systems:     No chest pain or dyspnea, no fevers or chills.          Physical Exam:      Blood pressure 139/69, pulse 80, temperature 97.5  F (36.4  C), temperature source Oral, resp. rate 20, height 1.549 m (5' 1\"), weight 69.5 kg (153 lb 4.8 oz), SpO2 97 %.  Vitals:    10/30/21 0140 10/31/21 0430   Weight: 70.3 kg (154 lb 15.7 oz) 69.5 kg (153 lb 4.8 oz)     Vital Signs with Ranges  Temp:  [97.5  F (36.4  C)-98.4  F (36.9  C)] 97.5  F (36.4  C)  Pulse:  [68-87] 80  Resp:  [20-22] 20  BP: (115-140)/(51-78) 139/69  SpO2:  [96 %-97 %] 97 %  I/O's Last 24 hours  I/O last 3 completed shifts:  In: 100 [P.O.:100]  Out: 1400 [Urine:1400]    GENERAL: Slender elderly lady with prominent " strabismus  HEENT:  normocephalic  NECK:  normal JVP.  LUNGS: Mildly decreased breath sounds, few crackles at the bases  CARDIOVASCULAR:  RRR, no gallop, murmur or rub  ABDOMEN: soft, NT, no apparent hepatosplenomegaly  EXTREMITIES:  no edema           Medications:          cefTRIAXone  1 g Intravenous Q24H     clopidogrel  75 mg Oral Daily     escitalopram  5 mg Oral Daily     ferrous fumarate 65 mg ([place]. FE)-Vitamin C 125 mg  1 tablet Oral Daily     fluticasone-vilanterol  1 puff Inhalation Daily     isosorbide mononitrate  45 mg Oral Daily     losartan  100 mg Oral Daily     metoprolol tartrate  25 mg Oral BID     pantoprazole  40 mg Oral Daily     rosuvastatin  5 mg Oral Every Other Day     sodium chloride (PF)  3 mL Intracatheter Q8H     umeclidinium  1 puff Inhalation Daily     Vitamin D3  25 mcg Oral Daily     zolpidem  5 mg Oral At Bedtime            Data:      All new lab and imaging data was reviewed.   Recent Labs   Lab Test 10/31/21  0547 10/30/21  0139 05/25/21  1105   WBC 17.9* 15.1* 6.8   HGB 9.6* 12.9 10.4*   MCV 95 99 97    248 198      Recent Labs   Lab Test 10/31/21  0547 10/30/21  0636 10/30/21  0139 21  1105 21  1105     --  138  --  141   POTASSIUM 3.7 3.4 3.6   < > 3.6   CHLORIDE 103  --  104  --  104   CO2 28  --  29  --  29   BUN 38*  --  27  --  24   CR 0.96  --  0.89  --  0.77   ANIONGAP 7  --  5  --  8   NELSON 8.2*  --  8.9  --  8.8   *  --  192*  --  102    < > = values in this interval not displayed.     Recent Labs   Lab Test 10/30/21  1033 10/30/21  0139   TROPONIN <0.015 <0.015         EKG results:  Reviewed    Imaging:   Recent Results (from the past 24 hour(s))   Echocardiogram Complete   Result Value    LVEF  60-65%    Narrative    [id number]  [id number]  [id number]  [id number]^GEORGE^RONY^MUNIR     [facility]  Echocardiography Laboratory  [address] [place] [id number]     Name: JEREMY DANIEL  MRN: [id number]  :  10/19/1930  Study Date: 10/30/2021 02:20 PM  Age: 89 yrs  Gender: Female  Patient Location: [facility]  Reason For Study: Heart Failure  Ordering Physician: RONY VAZQUEZ  Referring Physician: Mauricio Ortega  Performed By: Charles Melchor     BSA: 1.7 m2  Height: 61 in  Weight: 154 lb  HR: 71  BP: 148/78 mmHg  ______________________________________________________________________________  Procedure  Complete Portable Echo Adult.  ______________________________________________________________________________  Interpretation Summary     1. Left ventricular systolic function is normal. The visual ejection fraction  is 60-65%.  2. No regional wall motion abnormalities noted.  3. The right ventricle is normal in structure, function and size.  4. The left atrium is severely dilated.  5. There is moderate mitral annular calcification resulting in mild mitral  stenosis.  5. There is mild to moderate (1-2+) tricuspid regurgitation.  6. The ascending aorta is Mildly dilated, 4.1 cm.  7. No prior studies available for comparison.  ______________________________________________________________________________  Left Ventricle  The left ventricle is normal in size. There is mild concentric left  ventricular hypertrophy. Left ventricular systolic function is normal. The  visual ejection fraction is 60-65%. Diastolic Doppler findings (E/E' ratio  and/or other parameters) suggest left ventricular filling pressures are  indeterminate. No regional wall motion abnormalities noted.     Right Ventricle  The right ventricle is normal in structure, function and size.     Atria  The left atrium is severely dilated. The right atrium is severely dilated.     Mitral Valve  There is moderate mitral annular calcification. There is mild mitral stenosis.     Tricuspid Valve  The tricuspid valve is normal in structure and function. There is mild to  moderate (1-2+) tricuspid regurgitation.     Aortic Valve  The aortic valve is trileaflet with  aortic valve sclerosis.     Pulmonic Valve  The pulmonic valve is normal in structure and function. There is trace  pulmonic valvular regurgitation.     Vessels  Normal size aorta. The ascending aorta is Mildly dilated. IVC diameter <2.1 cm  collapsing >50% with sniff suggests a normal RA pressure of 3 mmHg.     Pericardium  There is no pericardial effusion.     Rhythm  Sinus rhythm was noted.  ______________________________________________________________________________  MMode/2D Measurements & Calculations  IVSd: 1.1 cm     LVIDd: 4.9 cm  LVIDs: 2.3 cm  LVPWd: 1.2 cm  FS: 52.5 %  LV mass(C)d: 215.3 grams  LV mass(C)dI: 127.4 grams/m2  Ao root diam: 3.4 cm  LA dimension: 3.9 cm  asc Aorta Diam: 4.2 cm  LA/Ao: 1.1  LVOT diam: 2.0 cm  LVOT area: 3.0 cm2  LA Volume (BP): 90.0 ml  LA Volume Index (BP): 53.3 ml/m2  RWT: 0.48     Doppler Measurements & Calculations  MV E max krys: 114.0 cm/sec  MV A max krys: 89.7 cm/sec  MV E/A: 1.3  MV max P.6 mmHg  MV mean P.4 mmHg  MV V2 VTI: 30.9 cm  MV dec slope: 530.4 cm/sec2  PA acc time: 0.11 sec  TR max krys: 287.8 cm/sec  TR max P.1 mmHg  E/E' av.2  Lateral E/e': 12.6  Medial E/e': 23.8     ______________________________________________________________________________  Report approved by: Karel Jolley 10/30/2021 03:25 PM

## 2021-11-01 NOTE — PLAN OF CARE
A&OX4, RA, denies pain. Tele- SR/SD, up with Assist of 1 with belt.  BP slightly elevated, takes pills with apple sauce.On cardiac diet, purewick used overnight. Slept comfortably.

## 2021-11-01 NOTE — CONSULTS
[facility]    Received [facility] Consult from Dr. Bravo.    Reviewed Ms. Pfeiffer's chart and note they are admitted for respiratory distress in setting of HFpEF and marked hypertension. Echocardiogram shows LVEF EF 60-65%. This is their second admission in the past year for concerns of heart failure.     Given above information, Ms. Pfeiffer meets criteria for general cardiology follow-up. I see that she is a facility resident, and note she may elect to continue cardiac cares with her PCP there. Will defer discharge arrangements to inpatient team.    Please call with questions.         Fernanda Schmid RN, BSN  [facility] RN Care Coordinator  [facility]  [phone]  [facility]: Cardiomyopathy, Optimization, Rehabilitation, Education

## 2021-11-01 NOTE — PROGRESS NOTES
[facility]    Medicine Progress Note - Hospitalist Service        Date of Admission:  10/30/2021  1:29 AM    Assessment & Plan:   Ermelinda Pfeiffer is a 89 year old female who presents with respiratory distress.     Acute hypoxic respiratory failure  Acute on chronic diastolic heart failure  CAD with hx multiple stents in 1992, 1997  Hypertension.  Tricuspid regurgitation  History of atrial fibrillation not on anticoagulation  HLD  [plavix 75 mg daily, imdur 45 mg daily, losartan 100 mg daily, metoprolol 25 mg BID, rosuvastatin 5 mg every other day, hydrochlorothiazide 25 mg daily.  * Most recent echo available with EF ~50%, PAP 54 mm Hg, valvular abnormalities.   *Admitted [facility] 4/2021 with similar presentation, BP was 230/105. Recent blood pressures at care facility have been 120-140 systolic.   *With acute SOB (thought by care facility to be related to sulfa abx but appears to have received macrobid). O2 sats for EMS 70-80%s, eventually placed on CPAP in ED with improvement. Initially hypertensive 197/100, improved with NTG infusion. BNP 2,242. Troponin negative. Initial lactic 2.7->3.3. WBC 15.1. VBG initial with acute respiratory acidosis. CXR c/w interstitial pulmonary edema.   -given methylpred in ED, hold on further steroids as she is not wheezy  -Respiratory status significantly improved, currently weaned off oxygen and saturating well on room air  -Serial troponins negative.  -Received IV Lasix yesterday, respiratory status is stable  -continue metoprolol 25 mg BID  -continue losartan 100 mg daily, imdur 45 mg daily  -continue plavix  -Echocardiogram done during this hospitalization shows EF of 60-65%, severely dilated left atrium, mild to moderate tricuspid regurgitation, it appears like echo is essentially unchanged from what was reported on outside echo from 7/2020  -No further scheduled diuretics at this time     UTI  Leukocytosis  Recurrent UTIs  Treated for UTI mid October with  10 day course of keflex.  Continues to have ongoing urinary symptoms. Repeat UA positive and reportedly started on macrobid.   -Continue ceftriaxone 1 g IV daily while in the hospital, will discharge on Vantin for 2 more days to complete a 5-day course.  -Leukocytosis resolved, clinically improving, procalcitonin also trending down    Elevated lactic acid  -Etiology unclear, lactic acid worsened yesterday however now has improved to normal.     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 Recently moved to TCU.  -Continue prior to admission Plavix     GERD  [pantoprazole 40 mg daily]  -Continue pantoprazole 40 mg PO daily     Insomnia  depression  [escitalopram 5 mg daily, ambien 5 mg at HS]  -Continue prior to admission Lexapro       Diet: Combination Diet Low Saturated Fat Diet; No Caffeine for 24 hours (once tests completed, may have caffeine)  Snacks/Supplements Adult: Ensure Enlive; Between Meals     DVT Prophylaxis: Pneumatic Compression Devices   Lemus Catheter: Not present  Code Status: No CPR- Do NOT Intubate     Disposition Plan    Expected discharge: 11/2, to long-term care facility.  Entered: Shan Salazar MD 11/01/2021, 1:14 PM        The patient's care was discussed with the Bedside Nurse and Patient.  And her daughter at the bedside.    Shan Salazar MD  Hospitalist Service  [facility] [place]  Text Page 7AM-6PM  Securely message with the Vocera Web Console (learn more here)  Text page via [employer] Paging/Directory    ______________________________________________________________________    Interval History   Patient continues to feel better but she claims she is not quite at her baseline from her strength standpoint and is very reluctant to be discharged  "today. Afebrile. White cell count improved. No further dyspnea.    Data reviewed today: I reviewed all medications, new labs and imaging results over the last 24 hours. I personally reviewed no images or EKG's today.    Physical Exam   Vital signs:  Temp: 97.4  F (36.3  C) Temp src: Oral BP: (!) 164/76 Pulse: 72   Resp: 18 SpO2: 96 % O2 Device: Nasal cannula Oxygen Delivery: 4 LPM Height: 154.9 cm (5' 1\") Weight: 70.2 kg (154 lb 12.8 oz)  Estimated body mass index is 29.25 kg/m  as calculated from the following:    Height as of this encounter: 1.549 m (5' 1\").    Weight as of this encounter: 70.2 kg (154 lb 12.8 oz).      Wt Readings from Last 2 Encounters:   11/01/21 70.2 kg (154 lb 12.8 oz)   10/08/21 67.3 kg (148 lb 6.4 oz)       Gen: AAOX2-3, NAD  Resp: Clear to auscultation bilaterally, normal effort of breathing  CVS: RRR, systolic murmur in the left parasternal area  Abd/GI: Soft, non-tender. BS- normoactive.    Skin: Warm, dry no rashes  MSK: no pedal edema  Neuro- CN- intact. No focal deficits.        Data   Recent Labs   Lab 11/01/21  0545 10/31/21  0547 10/30/21  1033 10/30/21  0636 10/30/21  0139 10/30/21  0139   WBC 9.8 17.9*  --   --   --  15.1*   HGB 9.4* 9.6*  --   --   --  12.9   MCV 97 95  --   --   --  99    209  --   --   --  248   NA  --  138  --   --   --  138   POTASSIUM 4.1 3.7  --  3.4   < > 3.6   CHLORIDE  --  103  --   --   --  104   CO2  --  28  --   --   --  29   BUN  --  38*  --   --   --  27   CR  --  0.96  --   --   --  0.89   ANIONGAP  --  7  --   --   --  5   NELSON  --  8.2*  --   --   --  8.9   GLC  --  133*  --   --   --  192*   ALBUMIN  --   --   --   --   --  3.5   PROTTOTAL  --   --   --   --   --  7.9   BILITOTAL  --   --   --   --   --  0.4   ALKPHOS  --   --   --   --   --  95   ALT  --   --   --   --   --  22   AST  --   --   --   --   --  21   TROPONIN  --   --  <0.015  --   --  <0.015    < > = values in this interval not displayed.       No results found for this or " any previous visit (from the past 24 hour(s)).  Medications     - MEDICATION INSTRUCTIONS -       Continuing ACE inhibitor/ARB/ARNI from home medication list OR ACE inhibitor/ARB order already placed during this visit       - MEDICATION INSTRUCTIONS -       nitroGLYcerin 30 mcg/min (10/30/21 0840)       cefTRIAXone  1 g Intravenous Q24H     clopidogrel  75 mg Oral Daily     escitalopram  5 mg Oral Daily     ferrous fumarate 65 mg ([place]. FE)-Vitamin C 125 mg  1 tablet Oral Daily     fluticasone-vilanterol  1 puff Inhalation Daily     isosorbide mononitrate  45 mg Oral Daily     losartan  100 mg Oral Daily     metoprolol tartrate  25 mg Oral BID     pantoprazole  40 mg Oral Daily     rosuvastatin  5 mg Oral Every Other Day     sodium chloride (PF)  3 mL Intracatheter Q8H     umeclidinium  1 puff Inhalation Daily     Vitamin D3  25 mcg Oral Daily     zolpidem  5 mg Oral At Bedtime

## 2021-11-01 NOTE — PLAN OF CARE
No episodes of resp. Distress. Reluctant to be dcd today. Appetiote good. . Tires easily. Appetite good

## 2021-11-01 NOTE — PROGRESS NOTES
Pt making needs known. HTN, MD aware. Room air. Combo diet, tolerating it. Pure wick in use and bathroom as well. Family by bedside, supportive. Plan to d/c tomorrow.

## 2021-11-01 NOTE — PROVIDER NOTIFICATION
Notified Dr Salazar regarding persistent high BP, no prns available. Scheduled med isn't available until 2100.

## 2021-11-02 NOTE — PLAN OF CARE
..Neuro- AXO 4  Tele/Cardiac- SR  Resp- RA  Activity- 1A GW  Pain- Denies  Drips- Intermittent Antibiotics   Drains/Tubes- PIV x 1  Skin- redness blanchable on Sacrum and back, Meplix on.    GI/- External Cath   Aggression Color- Green   COVID status- Neg  Plan- discharge to long term care facility 11/2.

## 2021-11-02 NOTE — PLAN OF CARE
A&O. BP elevated but VSS. Assist of 1. Denies pain/sob. Plans to discharge about 5pm with daughter back to [facility].

## 2021-11-02 NOTE — DISCHARGE SUMMARY
[facility]    Discharge Summary  Hospitalist    Date of Admission:  10/30/2021  Date of Discharge:  11/2/2021  Discharging Provider: Shan Salazar MD    Discharge Diagnoses      Acute hypoxic respiratory failure  Acute on chronic diastolic heart failure  CAD with hx multiple stents in 1992, 1997  Hypertension.  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     Acute hypoxic respiratory failure  Acute on chronic diastolic heart failure  CAD with hx multiple stents in 1992, 1997  Hypertension.  Tricuspid regurgitation  History of atrial fibrillation not on anticoagulation  HLD  [plavix 75 mg daily, imdur 45 mg daily, losartan 100 mg daily, metoprolol 25 mg BID, rosuvastatin 5 mg every other day, hydrochlorothiazide 25 mg daily.  * Most recent echo available with EF ~50%, PAP 54 mm Hg, valvular abnormalities.   *Admitted [facility] 4/2021 with similar presentation, BP was 230/105. Recent blood pressures at care facility have been 120-140 systolic.   *With acute SOB (thought by care facility to be related to sulfa abx but appears to have received macrobid). O2 sats for EMS 70-80%s, eventually placed on CPAP in ED with improvement. Initially hypertensive 197/100, improved with NTG infusion. BNP 2,242. Troponin negative. Initial lactic 2.7->3.3. WBC 15.1. VBG initial with acute respiratory acidosis. CXR c/w interstitial pulmonary edema.   -Received couple of doses of IV diuretics with which respiratory status significantly improved.  Weaned off oxygen and now saturating on room air  -Serial troponins negative.  -continue metoprolol 25 mg BID  -continue losartan 100 mg daily, I have changed hydrochlorothiazide to 12.5  mg daily in place of her previous dose of 25 mg every other day.  Continue imdur 45 mg daily  -continue plavix  -Echocardiogram done during this hospitalization shows EF of 60-65%, severely dilated left atrium, mild to moderate tricuspid regurgitation, it appears like echo is essentially unchanged from what was reported on outside echo from 7/2020  -No further scheduled diuretics at this time  -Blood pressure has been slightly up-and-down however considering her age is at a moderately overaggressive and treating this.  I think she will be okay with her prior to admission regimen.  I suggest continuing to follow her blood pressure closely at her long-term care facility and if still suboptimal in the next 2 to 3 days consider increasing beta-blocker to 50 mg twice a day.     UTI  Leukocytosis  Recurrent UTIs  Treated for UTI mid October with 10 day course of keflex.  Continues to have ongoing urinary symptoms. Repeat UA positive and reportedly started on macrobid.   -Continue ceftriaxone 1 g IV daily while in the hospital, will discharge on Vantin for 3 more days to complete a 7-day course.  -Leukocytosis resolved, clinically improving, procalcitonin also trending down     Elevated lactic acid  -Etiology unclear, lactic acid worsened yesterday however now has improved to normal.     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 Recently moved to TCU.  -Continue prior to admission Plavix     GERD  [pantoprazole 40 mg daily]  -Continue pantoprazole 40 mg PO daily     Insomnia  depression  [escitalopram 5 mg daily, ambien 5 mg at HS]  -Continue prior to admission Maddie Salazar MD    Significant Results and Procedures   See below    Pending Results     Unresulted Labs Ordered in the  Past 30 Days of this Admission     Date and Time Order Name Status Description    10/30/2021 11:29 AM Blood Culture Arm, Right Preliminary     10/30/2021 11:29 AM Blood Culture Peripheral Blood Preliminary     10/30/2021  3:46 AM Blood Culture Arm, Right Preliminary     10/30/2021  3:46 AM Blood Culture Arm, Left Preliminary           Code Status   DNR / DNI       Primary Care Physician   Mauricio Ortega    Physical Exam   Temp: 97.9  F (36.6  C) Temp src: Oral BP: (!) 150/73 Pulse: 65   Resp: 18 SpO2: 97 % O2 Device: None (Room air) Oxygen Delivery: 4 LPM    Constitutional: AAOX3, NAD  Respiratory: CTA B/L, Normal WOB  Cardiovascular: RRR, No murmur  GI: Soft, Non- tender, BS- normoactive  Neuro: CN- grossly intact, moving all 4 extremities.    Discharge Disposition   Discharged to long-term care facility  Condition at discharge: Stable    Consultations This Hospital Stay   CARDIOLOGY IP CONSULT  CORE CLINIC EVALUATION IP CONSULT  OCCUPATIONAL THERAPY ADULT IP CONSULT  NUTRITION SERVICES ADULT IP CONSULT  CARE MANAGEMENT / SOCIAL WORK IP CONSULT  PHYSICAL THERAPY ADULT IP CONSULT  PHYSICAL THERAPY ADULT IP CONSULT  OCCUPATIONAL THERAPY ADULT IP CONSULT    Time Spent on this Encounter   I, Shan Salazar MD, personally saw the patient today and spent greater than 30 minutes discharging this patient.    Discharge Orders      General info for SNF    Length of Stay Estimate: Short Term Care: Estimated # of Days <30  Condition at Discharge: Improving  Level of care:skilled   Rehabilitation Potential: Excellent  Admission H&P remains valid and up-to-date: Yes  Recent Chemotherapy: N/A  Use Nursing Home Standing Orders: Yes     Mantoux instructions    Give two-step Mantoux (PPD) Per Facility Policy Yes     Activity - Up with nursing assistance     Intake and output    Every shift     Daily weights    Call Provider for weight gain of more than 2 pounds per day or 5 pounds per week.     Follow Up and recommended labs  and tests    Follow up with group home physician.  The following labs/tests are recommended: BMP in 1 week     No CPR- Do NOT Intubate     Physical Therapy Adult Consult    Evaluate and treat as clinically indicated.    Reason:  Deconditioning     Occupational Therapy Adult Consult    Evaluate and treat as clinically indicated.    Reason:  Deconditioning     Fall precautions     Advance Diet as Tolerated    Follow this diet upon discharge: Orders Placed This Encounter      Snacks/Supplements Adult: Ensure Enlive; Between Meals      Combination Diet Low Saturated Fat Diet; No Caffeine for 24 hours (once tests completed, may have caffeine)       Discharge Medications   Current Discharge Medication List      START taking these medications    Details   cefpodoxime (VANTIN) 200 MG tablet Take 1 tablet (200 mg) by mouth 2 times daily for 3 days  Qty: 6 tablet, Refills: 0    Associated Diagnoses: Urinary tract infection without hematuria, site unspecified         CONTINUE these medications which have CHANGED    Details   hydrochlorothiazide (HYDRODIURIL) 25 MG tablet Take 0.5 tablets (12.5 mg) by mouth daily  Refills: 0    Associated Diagnoses: Essential hypertension      zolpidem (AMBIEN) 5 MG tablet Take 1 tablet (5 mg) by mouth At Bedtime  Qty: 15 tablet, Refills: 0    Associated Diagnoses: Primary insomnia         CONTINUE these medications which have NOT CHANGED    Details   clopidogrel (PLAVIX) 75 MG tablet Take 75 mg by mouth daily      escitalopram (LEXAPRO) 5 MG tablet Take 5 mg by mouth daily      ferrous fumarate 65 mg, [place]. FE,-Vitamin C 125 mg (VITRON C)  MG TABS tablet Take 1 tablet by mouth daily      fluticasone-salmeterol (ADVAIR) 500-50 MCG/DOSE inhaler Inhale 1 puff into the lungs every 12 hours for Asthma Rinse and spit after each use.      isosorbide mononitrate (IMDUR) 30 MG 24 hr tablet Take 1 tablet (30 mg) by mouth daily AND 0.5 tablets (15 mg) daily.  Qty: 30 tablet, Refills: 4     Associated Diagnoses: Hypertensive heart disease with heart failure (H)      lidocaine (LIDODERM) 5 % patch Place 1 patch onto the skin daily as needed for moderate pain To prevent lidocaine toxicity, patient should be patch free for 12 hrs daily.      losartan (COZAAR) 50 MG tablet Take 2 tablets (100 mg) by mouth daily  Qty: 30 tablet, Refills: 1    Associated Diagnoses: Essential hypertension      metoprolol tartrate (LOPRESSOR) 25 MG tablet Take 1 tablet (25 mg) by mouth 2 times daily Hold for SBP <100 or HR <60. Goal -130      nitroFURantoin macrocrystal-monohydrate (MACROBID) 100 MG capsule Take 1 capsule (100 mg) by mouth 2 times daily for 7 days  Qty: 14 capsule, Refills: 0    Associated Diagnoses: Acute cystitis without hematuria      nitroGLYcerin (NITROSTAT) 0.4 MG sublingual tablet Place 0.4 mg under the tongue every 5 minutes as needed for chest pain For chest pain place 1 tablet under the tongue every 5 minutes for 3 doses. If symptoms persist 5 minutes after 1st dose call 911.      nystatin (MYCOSTATIN) 902407 UNIT/GM external powder Apply topically as needed      pantoprazole (PROTONIX) 40 MG EC tablet Take 40 mg by mouth daily for Esophagitis Before breakfast      rosuvastatin (CRESTOR) 5 MG tablet Take 5 mg by mouth every other day At Bedtime  Qty:        SENNA-docusate sodium (SENNA S) 8.6-50 MG tablet Take 1 tablet by mouth 2 times daily as needed      tiotropium (SPIRIVA RESPIMAT) 2.5 MCG/ACT inhaler Inhale 2 puffs into the lungs daily  Qty: 4 g, Refills: 11    Associated Diagnoses: Chronic obstructive pulmonary disease, unspecified COPD type (H)      Vitamin D, Cholecalciferol, 25 MCG (1000 UT) TABS Take 2 tablets by mouth daily            Allergies   Allergies   Allergen Reactions     Sulfamethoxazole-Trimethoprim Rash     Levofloxacin Other (See Comments)     Red streaks on the side of nose & red cheeks     Clarithromycin Unknown     Mirtazapine Other (See Comments)     Grogginess in  the morning after taking     Penicillin G Other (See Comments)     Penicillin consult. May use penicillin and cephalosporin - 4/24/19      Data   Most Recent 3 CBC's:  Recent Labs   Lab Test 11/01/21  0545 10/31/21  0547 10/30/21  0139   WBC 9.8 17.9* 15.1*   HGB 9.4* 9.6* 12.9   MCV 97 95 99    209 248      Most Recent 3 BMP's:  Recent Labs   Lab Test 11/01/21  0545 10/31/21  0547 10/30/21  0636 10/30/21  0139 10/30/21  0139 05/25/21  1105 05/25/21  1105   NA  --  138  --   --  138  --  141   POTASSIUM 4.1 3.7 3.4   < > 3.6   < > 3.6   CHLORIDE  --  103  --   --  104  --  104   CO2  --  28  --   --  29  --  29   BUN  --  38*  --   --  27  --  24   CR  --  0.96  --   --  0.89  --  0.77   ANIONGAP  --  7  --   --  5  --  8   NELSON  --  8.2*  --   --  8.9  --  8.8   GLC  --  133*  --   --  192*  --  102    < > = values in this interval not displayed.     Most Recent 2 LFT's:  Recent Labs   Lab Test 10/30/21  0139 02/02/21  1155   AST 21 19   ALT 22 13   ALKPHOS 95 68   BILITOTAL 0.4 0.7     Most Recent INR's and Anticoagulation Dosing History:  Anticoagulation Dose History    There is no flowsheet data to display.       Most Recent 3 Troponin's:  Recent Labs   Lab Test 10/30/21  1033 10/30/21  0139   TROPONIN <0.015 <0.015     Most Recent Cholesterol Panel:No lab results found.  Most Recent 6 Bacteria Isolates From Any Culture (See EPIC Reports for Culture Details):No lab results found.  Most Recent TSH, T4 and A1c Labs:  Recent Labs   Lab Test 02/02/21  1155   TSH 2.28       Results for orders placed or performed during the hospital encounter of 10/30/21   XR Chest Port 1 View    Narrative    EXAM: CHEST SINGLE VIEW PORTABLE  LOCATION: [facility]  DATE/TIME: 10/30/2021 1:40 AM    INDICATION: Shortness of breath.  COMPARISON: 05/21/2021.    FINDINGS: Mildly to moderately increased interstitial opacities in both lungs. The lungs are otherwise clear. Possible cardiomegaly. Atherosclerotic  calcification in the thoracic aorta.      Impression    IMPRESSION: Mildly to moderately increased interstitial opacities in the lungs, most likely related to interstitial pulmonary edema.    POC US CHEST B-SCAN    Impression    [facility] Procedure Note      Limited Bedside ED Ultrasound of Thorax:    PROCEDURE: PERFORMED BY: Dr. Monty Rosa MD  INDICATIONS/SYMPTOM:  dyspnea  PROBE: High frequency linear probe  BODY LOCATION: Chest  FINDINGS:  Images of both lung hemithoracies taken in 2D in multiple rib spaces        Right side:  Lung sliding artifact  Present     B-line:  Present diffusely    Left side:  Lung sliding artifact  Present     B-line:  Present diffusely  Heart:  Decreased overall LV contraction    INTERPRETATION: Diffuse B-lines and decreased cardiac contractility concerning for CHF.  IMAGE DOCUMENTATION: Images were archived to PACs system.       CT Chest w/o Contrast    Narrative    EXAM: CT CHEST WITHOUT CONTRAST  LOCATION: [facility]  DATE/TIME: 10/30/2021, 12:38 PM    INDICATION: Respiratory failure, heart failure.  COMPARISON: None.  TECHNIQUE: CT chest without IV contrast. Multiplanar reformats were obtained. Dose reduction techniques were used.  CONTRAST: None.    FINDINGS:   LUNGS AND PLEURA: Tracheobronchomalacia is noted. Noncalcified 4 mm nodule left upper lobe (series 4, image 81). Dependent atelectatic changes noted in both lungs. There are small bilateral pleural effusions. Pulmonary vessels appear engorged. Mild   parabronchial thickening.    MEDIASTINUM/AXILLAE: Large hiatal hernia. Upper normal caliber of the thoracic aorta. Unremarkable thyroid. No pathologically enlarged thoracic or axillary lymph nodes.    CORONARY ARTERY CALCIFICATION: Severe.    UPPER ABDOMEN: No significant finding.    MUSCULOSKELETAL: Unremarkable.      Impression    IMPRESSION:   1.  Small bilateral pleural effusions, pulmonary vascular engorgement, and mild  parabronchial thickening suggestive of CHF.  2.  Mild bibasilar atelectasis and significant tracheobronchomalacia.  3.  Large hiatal hernia.  4.  Incidental pulmonary nodule measuring 4 mm.  5.  Severe coronary atherosclerosis.    Recommendations for one or multiple incidental lung nodules < 6mm:  Low-Risk Patient: No routine follow-up.  High-Risk Patient: Optional follow-up CT at 12 months; if unchanged, no further follow-up.    *Low Risk: Minimal or absent history of smoking or other known risk factors.  *Nonsolid (ground-glass) or partly solid nodules may require longer follow-up to exclude indolent adenocarcinoma.  *Recommendations based on Guidelines for the Management of Incidental Pulmonary Nodules Detected at CT: From the Fleischner Society 2017, Radiology 2017.     Echocardiogram Complete     Value    LVEF  60-65%    Narrative    [id number]  [id number]  [id number]  [id number]^GEORGE^RONY^MUNIR     [facility]  Echocardiography Laboratory  [address] [place] [address]     Name: JEREMY DANIEL  MRN: [id number]  : 10/19/1930  Study Date: 10/30/2021 02:20 PM  Age: 89 yrs  Gender: Female  Patient Location: [facility]  Reason For Study: Heart Failure  Ordering Physician: RONY VAZQUEZ  Referring Physician: Mauricio Ortega  Performed By: Charles Melchor     BSA: 1.7 m2  Height: 61 in  Weight: 154 lb  HR: 71  BP: 148/78 mmHg  ______________________________________________________________________________  Procedure  Complete Portable Echo Adult.  ______________________________________________________________________________  Interpretation Summary     1. Left ventricular systolic function is normal. The visual ejection fraction  is 60-65%.  2. No regional wall motion abnormalities noted.  3. The right ventricle is normal in structure, function and size.  4. The left atrium is severely dilated.  5. There is moderate mitral annular calcification resulting in mild mitral  stenosis.  5.  There is mild to moderate (1-2+) tricuspid regurgitation.  6. The ascending aorta is Mildly dilated, 4.1 cm.  7. No prior studies available for comparison.  ______________________________________________________________________________  Left Ventricle  The left ventricle is normal in size. There is mild concentric left  ventricular hypertrophy. Left ventricular systolic function is normal. The  visual ejection fraction is 60-65%. Diastolic Doppler findings (E/E' ratio  and/or other parameters) suggest left ventricular filling pressures are  indeterminate. No regional wall motion abnormalities noted.     Right Ventricle  The right ventricle is normal in structure, function and size.     Atria  The left atrium is severely dilated. The right atrium is severely dilated.     Mitral Valve  There is moderate mitral annular calcification. There is mild mitral stenosis.     Tricuspid Valve  The tricuspid valve is normal in structure and function. There is mild to  moderate (1-2+) tricuspid regurgitation.     Aortic Valve  The aortic valve is trileaflet with aortic valve sclerosis.     Pulmonic Valve  The pulmonic valve is normal in structure and function. There is trace  pulmonic valvular regurgitation.     Vessels  Normal size aorta. The ascending aorta is Mildly dilated. IVC diameter <2.1 cm  collapsing >50% with sniff suggests a normal RA pressure of 3 mmHg.     Pericardium  There is no pericardial effusion.     Rhythm  Sinus rhythm was noted.  ______________________________________________________________________________  MMode/2D Measurements & Calculations  IVSd: 1.1 cm     LVIDd: 4.9 cm  LVIDs: 2.3 cm  LVPWd: 1.2 cm  FS: 52.5 %  LV mass(C)d: 215.3 grams  LV mass(C)dI: 127.4 grams/m2  Ao root diam: 3.4 cm  LA dimension: 3.9 cm  asc Aorta Diam: 4.2 cm  LA/Ao: 1.1  LVOT diam: 2.0 cm  LVOT area: 3.0 cm2  LA Volume (BP): 90.0 ml  LA Volume Index (BP): 53.3 ml/m2  RWT: 0.48     Doppler Measurements & Calculations  MV E max  krys: 114.0 cm/sec  MV A max krys: 89.7 cm/sec  MV E/A: 1.3  MV max P.6 mmHg  MV mean P.4 mmHg  MV V2 VTI: 30.9 cm  MV dec slope: 530.4 cm/sec2  PA acc time: 0.11 sec  TR max krys: 287.8 cm/sec  TR max P.1 mmHg  E/E' av.2  Lateral E/e': 12.6  Medial E/e': 23.8     ______________________________________________________________________________  Report approved by: Karel Jolley 10/30/2021 03:25 PM

## 2021-11-02 NOTE — CONSULTS
"Care Management Initial Consult    General Information  Assessment completed with: Care Team Member, LTC Nurse [phone]  Type of CM/SW Visit: Initial Assessment  Primary Care Provider verified and updated as needed: Yes (Dr. Mauricio Ortega, [phone])   Readmission within the last 30 days:   no   Reason for Consult: discharge planning  Advance Care Planning:    POLST dated 9-28-20 on file in EPIC     Communication Assessment  Patient's communication style: spoken language (English or Bilingual)    Hearing Difficulty or Deaf: yes   Wear Glasses or Blind: no    Cognitive  Cognitive/Neuro/Behavioral: WDL  Level of Consciousness: alert  Arousal Level: opens eyes spontaneously                Living Environment:   People in home: facility resident  n/a  Current living Arrangements: extended care facility  Name of Facility: [facility]  (phone [phone] / fax [phone])   Able to return to prior arrangements: no       Family/Social Support:  Care provided by:  (facility staff)  Provides care for: no one  Marital Status:   Support system: LTC; also Children (Chelo Navarrete and Wilber)          Description of Support System: Supportive, Involved       Current Resources:   Patient receiving home care services: No  Community Resources:  (LTC)  Equipment currently used at home: walker, standard (grab bar, seat in the shower)  Supplies currently used at home: Incontinence Supplies    Employment/Financial:  Employment Status: retired     Financial Concerns: No concerns identified   Finance Comments: active MEDICARE/MEDICARE, [place], AND HEALTH[employer]NERS/HEALTH[employer] insurance     Lifestyle & Psychosocial Needs:  Outpatient \"social determinants of health\" assessment not done.     Functional Status:  Assesssment of Functional Status:  (see therapy assessments )    Mental Health Status:  Mental Health Status: No Current Concerns       Chemical Dependency Status:  Chemical " Dependency Status: No Current Concerns       Values/Beliefs:  Spiritual, Cultural Beliefs, Spiritism Practices, Values that affect care: no          Values/Beliefs Comment: Latter day    Additional Information:  Contacted [facility] unit [phone] to request fax number for sending discharge orders (fax [phone]); they are already aware of discharge of 5pm and have received orders sent by unit SW.  Confirmed information above; will touch base with unit SW if there is anything I can do to help.     Renetta Linder RN, BSN, PHN  [facility]  Inpatient Care Management - FLOAT  Mobile: [phone] 11/02/21 until 4pm  (after today's date, please call the patient's unit)

## 2021-11-02 NOTE — PROGRESS NOTES
[facility]  Progress Note  [facility]     attempted multiple times to follow-up with pt. Today. Pt was sleeping soundly each time. Unit  to follow-up as pt is available.       [facility]  Staff   Pager [phone]

## 2021-11-02 NOTE — PROGRESS NOTES
Care Management Discharge Note    Discharge Date: 11/02/2021       Discharge Disposition: Skilled Nursing Facilty    Discharge Services:  N/A    Discharge DME: None    Discharge Transportation: family or friend will provide    Private pay costs discussed: Not applicable    PAS Confirmation Code:  N/A  Patient/family educated on Medicare website which has current facility and service quality ratings: no    Education Provided on the Discharge Plan: yes   Persons Notified of Discharge Plans: p[atient's daughter Rosalba  Patient/Family in Agreement with the Plan: yes    Handoff Referral Completed: Yes    Additional Information:  Received discharge orders for patient.  Bed availab le at [facility] for today.  Call placed to update patient's daughter Rosalba and she is in agreement with the plan.  Kadeem plans to transport patient around 17:00 today.  Patient informed of the plan and in agreement to the plan.  Call placed to update [facility] and faxed the orders  Patient does not need a PAS as she was admitted from there.      WILMER Canela, [facility]    [phone]  [facility]

## 2021-11-03 NOTE — PROGRESS NOTES
Clinic Care Coordination Contact    Background: Care Coordination referral placed from [facility] discharge report for reason of patient meeting criteria for a TCM outreach call by [facility] team.    Assessment: Upon chart review, CCR[facility] Team member will cancel/close the referral for TCM outreach due to reason below:       Patient has discharged to another healthcare facility, skilled nursing facility or group home.         Yas Damon MA  [facility] [place], [facility]

## 2021-11-04 NOTE — PROGRESS NOTES
[facility]  PRIMARY CARE PROVIDER AND CLINIC:  Hao Araujo, APRN CNP, [address] / [place] [address]  Chief Complaint   Patient presents with     Hospital F/U     [facility] Medical Record Number:  [id number]  Place of Service where encounter took place:  [facility] () [[id number]]    Ermelinda Pfeiffer  is a 89 year old  (10/19/1930), returned to the above facility from  [facility]. Hospital stay 10/30/21 - 11/2/21. .  Admitted to this facility for  rehab, medical management and nursing care.    HPI:    HPI information obtained from: facility chart records.   Brief Summary of Hospital Course: treated for acute hypoxic respiratory failure 2/2 acute on chronic CHF likely triggered by marked HTN in addition to UTI (which she was being treated for prior to hospitalization. Similar presentation with previous [facility] hospital stay 4/2021. CXR c/w interstitial pulmonary edema and she does not like diuretic therapy as it increases urinary issues.   Updates on Status Since Skilled nursing Admission:   Says she feels like has a cold, tired and fatigued. Up in her room and eating lunch. Appears at baseline.     CODE STATUS/ADVANCE DIRECTIVES DISCUSSION:   DNR / DNI  Patient's living condition: lives in a skilled nursing facility  ALLERGIES: Sulfamethoxazole-trimethoprim, Levofloxacin, Clarithromycin, Mirtazapine, and Penicillin g  PAST MEDICAL HISTORY:  has a past medical history of Acute sinus infection, Anemia, CAD (coronary artery disease), Cancer of the skin, basal cell, Cataract, Depressive disorder, Diplopia, Disturbance, sleep, GERD (gastroesophageal reflux disease), HTN (hypertension), Hyperlipidemia, Insomnia, Myocardial infarction (H), Osteoporosis, Skin cancer, Status post parathyroidectomy (05/23/2016), Stone, kidney, and Transient ischemic attack.  PAST SURGICAL HISTORY:   has a past surgical history that includes Parathyroidectomy (1969) and PERCUTANEOUS  TRANSLUMINAL BALLOON ANGIOPLASTY WITH INSERTION OF STENT INTO CORONARY ARTERY N/A  (10/29/1997).  FAMILY HISTORY: family history includes Suicide in her maternal aunt and maternal uncle.  SOCIAL HISTORY:   reports that she has quit smoking. She has a 3.25 pack-year smoking history. She has never used smokeless tobacco. She reports that she does not drink alcohol and does not use drugs.    Post Discharge Medication Reconciliation Status: discharge medications reconciled and changed, per note/orders    Current Outpatient Medications   Medication Sig Dispense Refill     albuterol (PROAIR HFA/PROVENTIL HFA/VENTOLIN HFA) 108 (90 Base) MCG/ACT inhaler Inhale 2 puffs into the lungs every 4 hours as needed for shortness of breath / dyspnea or wheezing       cefpodoxime (VANTIN) 200 MG tablet Take 1 tablet (200 mg) by mouth 2 times daily for 3 days 6 tablet 0     clopidogrel (PLAVIX) 75 MG tablet Take 75 mg by mouth daily       escitalopram (LEXAPRO) 5 MG tablet Take 5 mg by mouth daily       ferrous fumarate 65 mg, [place]. FE,-Vitamin C 125 mg (VITRON C)  MG TABS tablet Take 1 tablet by mouth daily       fluticasone-salmeterol (ADVAIR) 500-50 MCG/DOSE inhaler Inhale 1 puff into the lungs every 12 hours for Asthma Rinse and spit after each use.       hydrochlorothiazide (HYDRODIURIL) 25 MG tablet Take 0.5 tablets (12.5 mg) by mouth daily  0     hydrocortisone (CORTAID) 1 % external cream Apply topically 2 times daily       isosorbide mononitrate (IMDUR) 30 MG 24 hr tablet Take 1 tablet (30 mg) by mouth daily AND 0.5 tablets (15 mg) daily. 30 tablet 4     lidocaine (LIDODERM) 5 % patch Place 1 patch onto the skin daily as needed for moderate pain To prevent lidocaine toxicity, patient should be patch free for 12 hrs daily.       losartan (COZAAR) 50 MG tablet Take 2 tablets (100 mg) by mouth daily 30 tablet 1     metoprolol tartrate (LOPRESSOR) 25 MG tablet Take 1.5 tablets (37.5 mg) by mouth 2 times daily Hold for SBP  "<100 or HR <60. Goal -130 45 tablet 11     muscle rub (ARTHRITIS HOT) 10-15 % CREA Apply topically 3 times daily Apply to both heels       nitroGLYcerin (NITROSTAT) 0.4 MG sublingual tablet Place 0.4 mg under the tongue every 5 minutes as needed for chest pain For chest pain place 1 tablet under the tongue every 5 minutes for 3 doses. If symptoms persist 5 minutes after 1st dose call 911.       nystatin (MYCOSTATIN) 142158 UNIT/GM external powder Apply topically as needed       pantoprazole (PROTONIX) 40 MG EC tablet Take 40 mg by mouth daily for Esophagitis Before breakfast       rosuvastatin (CRESTOR) 5 MG tablet Take 5 mg by mouth every other day At Bedtime       SENNA-docusate sodium (SENNA S) 8.6-50 MG tablet Take 1 tablet by mouth 2 times daily as needed       tiotropium (SPIRIVA RESPIMAT) 2.5 MCG/ACT inhaler Inhale 2 puffs into the lungs daily 4 g 11     Vitamin D, Cholecalciferol, 25 MCG (1000 UT) TABS Take 2 tablets by mouth daily        zolpidem (AMBIEN) 5 MG tablet Take 1 tablet (5 mg) by mouth At Bedtime 15 tablet 0     ROS:  Limited secondary to cognitive impairment but today pt reports ok    Vitals:  BP (!) 151/68   Pulse 76   Temp 97.7  F (36.5  C)   Resp 18   Ht 1.549 m (5' 1\")   Wt 69.5 kg (153 lb 4.8 oz)   SpO2 96%   BMI 28.97 kg/m    Exam:  GENERAL APPEARANCE: alert-flat at baseline   ENT:  Mouth and posterior oropharynx normal, dry mucous membranes  EYES:  EOM, conjunctivae WNL,  right eye esotropia, periorbital edema bilateral   RESP: LS grossly clear-no cough or wheeze with exam, some rhonchi at baseline (not today)  CV:  trace edema-no compression  ABDOMEN:  no guarding or rebound BS +  M/S:   kyphotic stance, some generalized weakness but at baseline, walks with SBA and 2WW  SKIN:  intact to visualized areas-lesion on forehead is raised, dry and flaky, left nasal lesion-black lesion   NEURO:   Cranial nerves 2-12 are normal tested and grossly at patient's " "baseline  PSYCH: reduced insight, judgement and memory    Lab/Diagnostic data:  CBC RESULTS: Recent Labs   Lab Test 11/01/21  0545 10/31/21  0547   WBC 9.8 17.9*   RBC 3.10* 3.11*   HGB 9.4* 9.6*   HCT 30.1* 29.6*   MCV 97 95   MCH 30.3 30.9   MCHC 31.2* 32.4   RDW 13.2 13.1    209       Last Basic Metabolic Panel:  Recent Labs   Lab Test 11/01/21  0545 10/31/21  0547 10/30/21  0636 10/30/21  0139   NA  --  138  --  138   POTASSIUM 4.1 3.7   < > 3.6   CHLORIDE  --  103  --  104   NELSON  --  8.2*  --  8.9   CO2  --  28  --  29   BUN  --  38*  --  27   CR  --  0.96  --  0.89   GLC  --  133*  --  192*    < > = values in this interval not displayed.       Liver Function Studies -   Recent Labs   Lab Test 10/30/21  0139 02/02/21  1155   PROTTOTAL 7.9 6.6   ALBUMIN 3.5 3.3*   BILITOTAL 0.4 0.7   ALKPHOS 95 68   AST 21 19   ALT 22 13       TSH   Date Value Ref Range Status   02/02/2021 2.28 0.30 - 5.00 uIU/mL Final       No results found for: A1C    ASSESSMENT/PLAN:  (I11.0) Hypertensive heart disease with heart failure (H)  (I50.32) Chronic diastolic congestive heart failure (H)  (I10) Essential hypertension  (Z86.73) History of CVA (cerebrovascular accident)  (I25.10) Coronary artery disease involving native coronary artery without angina pectoris, unspecified whether native or transplanted heart  Comment: needs daily BP/HR and weights. BP slightly elevated today and with 1 reading yesterday. No updated weight since return from hospital. Recent EKG/Echo with no atrial fibrillation. Per cardiology notes: acute (probable) diastolic CHF in a patient with likely chronically elevated LVEDP and a very narrow margin between \"euvolemia\" and pulmonary edema.  Plan:   -isosorbide mononitrate (IMDUR) ER from 30 mg po daily to 45 mg po daily at previous visit   -metoprolol 25 mg po BID per cardiology could increase to 50 mg po BID. Will start with smaller increase 37.5 mg po BID and follow   -losartan 100 mg po daily at " previous visit   -HCTZ 25 mg po every other day now 12.5 mg po daily  -HR/BP monitoring from facility   -nitroglycerin prn  -clopidogrel 75 mg po daily   -BMP and CBC next week     (Z87.440) Personal history of urinary tract infection   Comment: frequent episodes. Follows with urology   Plan:  -cefpodoxime until 11/6  -would encourage her to accept assistance with [facility] for better hygiene outcomes contributing to UTIs            Electronically signed by:  AZAEL Martínez CNP

## 2021-11-05 NOTE — LETTER
11/5/2021        RE: Ermelinda Pfeiffer  [facility]  [address]  [facility] [address]        [facility]  PRIMARY CARE PROVIDER AND CLINIC:  AZAEL Martínez CNP, [address] [place] [address] / [facility] [address]  Chief Complaint   Patient presents with     Hospital F/U     [facility] Medical Record Number:  [id number]  Place of Service where encounter took place:  [facility] () [[id number]]    Ermelinda Pfeiffer  is a 89 year old  (10/19/1930), returned to the above facility from  [facility]. Hospital stay 10/30/21 - 11/2/21. .  Admitted to this facility for  rehab, medical management and nursing care.    HPI:    HPI information obtained from: facility chart records.   Brief Summary of Hospital Course: treated for acute hypoxic respiratory failure 2/2 acute on chronic CHF likely triggered by marked HTN in addition to UTI (which she was being treated for prior to hospitalization. Similar presentation with previous [facility] hospital stay 4/2021. CXR c/w interstitial pulmonary edema and she does not like diuretic therapy as it increases urinary issues.   Updates on Status Since Skilled nursing Admission:   Says she feels like has a cold, tired and fatigued. Up in her room and eating lunch. Appears at baseline.     CODE STATUS/ADVANCE DIRECTIVES DISCUSSION:   DNR / DNI  Patient's living condition: lives in a skilled nursing facility  ALLERGIES: Sulfamethoxazole-trimethoprim, Levofloxacin, Clarithromycin, Mirtazapine, and Penicillin g  PAST MEDICAL HISTORY:  has a past medical history of Acute sinus infection, Anemia, CAD (coronary artery disease), Cancer of the skin, basal cell, Cataract, Depressive disorder, Diplopia, Disturbance, sleep, GERD (gastroesophageal reflux disease), HTN (hypertension), Hyperlipidemia, Insomnia, Myocardial infarction (H), Osteoporosis, Skin cancer, Status post parathyroidectomy (05/23/2016), Stone, kidney, and Transient ischemic  attack.  PAST SURGICAL HISTORY:   has a past surgical history that includes Parathyroidectomy (1969) and PERCUTANEOUS TRANSLUMINAL BALLOON ANGIOPLASTY WITH INSERTION OF STENT INTO CORONARY ARTERY N/A  (10/29/1997).  FAMILY HISTORY: family history includes Suicide in her maternal aunt and maternal uncle.  SOCIAL HISTORY:   reports that she has quit smoking. She has a 3.25 pack-year smoking history. She has never used smokeless tobacco. She reports that she does not drink alcohol and does not use drugs.    Post Discharge Medication Reconciliation Status: discharge medications reconciled and changed, per note/orders    Current Outpatient Medications   Medication Sig Dispense Refill     albuterol (PROAIR HFA/PROVENTIL HFA/VENTOLIN HFA) 108 (90 Base) MCG/ACT inhaler Inhale 2 puffs into the lungs every 4 hours as needed for shortness of breath / dyspnea or wheezing       cefpodoxime (VANTIN) 200 MG tablet Take 1 tablet (200 mg) by mouth 2 times daily for 3 days 6 tablet 0     clopidogrel (PLAVIX) 75 MG tablet Take 75 mg by mouth daily       escitalopram (LEXAPRO) 5 MG tablet Take 5 mg by mouth daily       ferrous fumarate 65 mg, [place]. FE,-Vitamin C 125 mg (VITRON C)  MG TABS tablet Take 1 tablet by mouth daily       fluticasone-salmeterol (ADVAIR) 500-50 MCG/DOSE inhaler Inhale 1 puff into the lungs every 12 hours for Asthma Rinse and spit after each use.       hydrochlorothiazide (HYDRODIURIL) 25 MG tablet Take 0.5 tablets (12.5 mg) by mouth daily  0     hydrocortisone (CORTAID) 1 % external cream Apply topically 2 times daily       isosorbide mononitrate (IMDUR) 30 MG 24 hr tablet Take 1 tablet (30 mg) by mouth daily AND 0.5 tablets (15 mg) daily. 30 tablet 4     lidocaine (LIDODERM) 5 % patch Place 1 patch onto the skin daily as needed for moderate pain To prevent lidocaine toxicity, patient should be patch free for 12 hrs daily.       losartan (COZAAR) 50 MG tablet Take 2 tablets (100 mg) by mouth daily 30  "tablet 1     metoprolol tartrate (LOPRESSOR) 25 MG tablet Take 1.5 tablets (37.5 mg) by mouth 2 times daily Hold for SBP <100 or HR <60. Goal -130 45 tablet 11     muscle rub (ARTHRITIS HOT) 10-15 % CREA Apply topically 3 times daily Apply to both heels       nitroGLYcerin (NITROSTAT) 0.4 MG sublingual tablet Place 0.4 mg under the tongue every 5 minutes as needed for chest pain For chest pain place 1 tablet under the tongue every 5 minutes for 3 doses. If symptoms persist 5 minutes after 1st dose call 911.       nystatin (MYCOSTATIN) 192446 UNIT/GM external powder Apply topically as needed       pantoprazole (PROTONIX) 40 MG EC tablet Take 40 mg by mouth daily for Esophagitis Before breakfast       rosuvastatin (CRESTOR) 5 MG tablet Take 5 mg by mouth every other day At Bedtime       SENNA-docusate sodium (SENNA S) 8.6-50 MG tablet Take 1 tablet by mouth 2 times daily as needed       tiotropium (SPIRIVA RESPIMAT) 2.5 MCG/ACT inhaler Inhale 2 puffs into the lungs daily 4 g 11     Vitamin D, Cholecalciferol, 25 MCG (1000 UT) TABS Take 2 tablets by mouth daily        zolpidem (AMBIEN) 5 MG tablet Take 1 tablet (5 mg) by mouth At Bedtime 15 tablet 0     ROS:  Limited secondary to cognitive impairment but today pt reports ok    Vitals:  BP (!) 151/68   Pulse 76   Temp 97.7  F (36.5  C)   Resp 18   Ht 1.549 m (5' 1\")   Wt 69.5 kg (153 lb 4.8 oz)   SpO2 96%   BMI 28.97 kg/m    Exam:  GENERAL APPEARANCE: alert-flat at baseline   ENT:  Mouth and posterior oropharynx normal, dry mucous membranes  EYES:  EOM, conjunctivae WNL,  right eye esotropia, periorbital edema bilateral   RESP: LS grossly clear-no cough or wheeze with exam, some rhonchi at baseline (not today)  CV:  trace edema-no compression  ABDOMEN:  no guarding or rebound BS +  M/S:   kyphotic stance, some generalized weakness but at baseline, walks with SBA and 2WW  SKIN:  intact to visualized areas-lesion on forehead is raised, dry and flaky, left " "nasal lesion-black lesion   NEURO:   Cranial nerves 2-12 are normal tested and grossly at patient's baseline  PSYCH: reduced insight, judgement and memory    Lab/Diagnostic data:  CBC RESULTS: Recent Labs   Lab Test 11/01/21  0545 10/31/21  0547   WBC 9.8 17.9*   RBC 3.10* 3.11*   HGB 9.4* 9.6*   HCT 30.1* 29.6*   MCV 97 95   MCH 30.3 30.9   MCHC 31.2* 32.4   RDW 13.2 13.1    209       Last Basic Metabolic Panel:  Recent Labs   Lab Test 11/01/21 0545 10/31/21  0547 10/30/21  0636 10/30/21  0139   NA  --  138  --  138   POTASSIUM 4.1 3.7   < > 3.6   CHLORIDE  --  103  --  104   NELSON  --  8.2*  --  8.9   CO2  --  28  --  29   BUN  --  38*  --  27   CR  --  0.96  --  0.89   GLC  --  133*  --  192*    < > = values in this interval not displayed.       Liver Function Studies -   Recent Labs   Lab Test 10/30/21  0139 02/02/21  1155   PROTTOTAL 7.9 6.6   ALBUMIN 3.5 3.3*   BILITOTAL 0.4 0.7   ALKPHOS 95 68   AST 21 19   ALT 22 13       TSH   Date Value Ref Range Status   02/02/2021 2.28 0.30 - 5.00 uIU/mL Final       No results found for: A1C    ASSESSMENT/PLAN:  (I11.0) Hypertensive heart disease with heart failure (H)  (I50.32) Chronic diastolic congestive heart failure (H)  (I10) Essential hypertension  (Z86.73) History of CVA (cerebrovascular accident)  (I25.10) Coronary artery disease involving native coronary artery without angina pectoris, unspecified whether native or transplanted heart  Comment: needs daily BP/HR and weights. BP slightly elevated today and with 1 reading yesterday. No updated weight since return from hospital. Recent EKG/Echo with no atrial fibrillation. Per cardiology notes: acute (probable) diastolic CHF in a patient with likely chronically elevated LVEDP and a very narrow margin between \"euvolemia\" and pulmonary edema.  Plan:   -isosorbide mononitrate (IMDUR) ER from 30 mg po daily to 45 mg po daily at previous visit   -metoprolol 25 mg po BID per cardiology could increase to 50 mg po " BID. Will start with smaller increase 37.5 mg po BID and follow   -losartan 100 mg po daily at previous visit   -HCTZ 25 mg po every other day now 12.5 mg po daily  -HR/BP monitoring from facility   -nitroglycerin prn  -clopidogrel 75 mg po daily   -BMP and CBC next week     (Z87.440) Personal history of urinary tract infection   Comment: frequent episodes. Follows with urology   Plan:  -cefpodoxime until 11/6  -would encourage her to accept assistance with [facility] for better hygiene outcomes contributing to UTIs            Electronically signed by:  AZAEL Martínez CNP                         Sincerely,        AZAEL Martínez CNP

## 2021-12-06 NOTE — LETTER
12/6/2021        RE: Ermelinda Pfeiffer  [facility]  [address]  [facility] [address]        [place] [facility]  [place] Medical Record Number:  [id number]  Place of Service where encounter took place:  [facility] () [[id number]]  Chief Complaint   Patient presents with     Nursing Home Acute       HPI:    Ermelinda Pfeiffer  is a 89 year old (10/19/1930), who is being seen today for an episodic care visit.  HPI information obtained from: facility chart records and patient report. Today's concern is:    Seen today for follow up to hypertension. BP have been bettered controlled recently with average 141/72 HR 74-79. Says the medication still makes her tired but she is up, alert, eating lunch and was working with PT later in the day. History of acute on choric CHF likely triggered by marked hypertension x 2. CXR c/w interstitial pulmonary edema and she does not like diuretic therapy as it increases urinary issues.     Past Medical and Surgical History reviewed in Epic today.    MEDICATIONS:    Current Outpatient Medications   Medication Sig Dispense Refill     albuterol (PROAIR HFA/PROVENTIL HFA/VENTOLIN HFA) 108 (90 Base) MCG/ACT inhaler Inhale 2 puffs into the lungs every 4 hours as needed for shortness of breath / dyspnea or wheezing       clopidogrel (PLAVIX) 75 MG tablet Take 75 mg by mouth daily       escitalopram (LEXAPRO) 5 MG tablet Take 5 mg by mouth daily       ferrous fumarate 65 mg, [place]. FE,-Vitamin C 125 mg (VITRON C)  MG TABS tablet Take 1 tablet by mouth daily       fluticasone-salmeterol (ADVAIR) 500-50 MCG/DOSE inhaler Inhale 1 puff into the lungs every 12 hours for Asthma Rinse and spit after each use.       hydrochlorothiazide (HYDRODIURIL) 25 MG tablet Take 0.5 tablets (12.5 mg) by mouth daily  0     hydrocortisone (CORTAID) 1 % external cream Apply topically 2 times daily       isosorbide mononitrate (IMDUR) 30 MG 24 hr tablet Take 1 tablet  "(30 mg) by mouth daily AND 0.5 tablets (15 mg) daily. 30 tablet 4     lidocaine (LIDODERM) 5 % patch Place 1 patch onto the skin daily as needed for moderate pain To prevent lidocaine toxicity, patient should be patch free for 12 hrs daily.       losartan (COZAAR) 50 MG tablet Take 2 tablets (100 mg) by mouth daily 30 tablet 1     metoprolol tartrate (LOPRESSOR) 25 MG tablet Take 1.5 tablets (37.5 mg) by mouth 2 times daily Hold for SBP <100 or HR <60. Goal -130 45 tablet 11     muscle rub (ARTHRITIS HOT) 10-15 % CREA Apply topically 3 times daily Apply to both heels       nitroGLYcerin (NITROSTAT) 0.4 MG sublingual tablet Place 0.4 mg under the tongue every 5 minutes as needed for chest pain For chest pain place 1 tablet under the tongue every 5 minutes for 3 doses. If symptoms persist 5 minutes after 1st dose call 911.       nystatin (MYCOSTATIN) 146104 UNIT/GM external powder Apply topically as needed       pantoprazole (PROTONIX) 40 MG EC tablet Take 40 mg by mouth daily for Esophagitis Before breakfast       rosuvastatin (CRESTOR) 5 MG tablet Take 5 mg by mouth every other day At Bedtime       SENNA-docusate sodium (SENNA S) 8.6-50 MG tablet Take 1 tablet by mouth 2 times daily as needed       tiotropium (SPIRIVA RESPIMAT) 2.5 MCG/ACT inhaler Inhale 2 puffs into the lungs daily 4 g 11     Vitamin D, Cholecalciferol, 25 MCG (1000 UT) TABS Take 2 tablets by mouth daily        zolpidem (AMBIEN) 5 MG tablet Take 1 tablet (5 mg) by mouth At Bedtime 30 tablet 0     REVIEW OF SYSTEMS:  4 point ROS including Respiratory, CV, GI and , other than that noted in the HPI,  is negative    Objective:  BP (!) 141/67   Pulse 74   Temp 97.9  F (36.6  C)   Resp 18   Ht 1.549 m (5' 1\")   Wt 66.2 kg (146 lb)   SpO2 96%   BMI 27.59 kg/m    Exam:  GENERAL APPEARANCE: alert-flat at baseline   ENT:  Mouth and posterior oropharynx normal, dry mucous membranes  EYES:  EOM, conjunctivae WNL,  right eye esotropia, " "periorbital edema bilateral   RESP: LS grossly clear-no cough or wheeze with exam, some rhonchi at baseline (not today)  CV:  trace edema-no compression or socks   ABDOMEN:  no guarding or rebound BS +  M/S:   kyphotic stance, some generalized weakness but at baseline, walks with SBA and 2WW  SKIN:  intact to visualized areas-lesion on forehead is raised, dry and flaky, left nasal lesion-black lesion   NEURO:   Cranial nerves 2-12 are normal tested and grossly at patient's baseline  PSYCH: reduced insight, judgement and memory    Labs:   CBC RESULTS: Recent Labs   Lab Test 11/08/21  0638 11/01/21  0545   WBC 5.1 9.8   RBC 3.41* 3.10*   HGB 10.1* 9.4*   HCT 32.3* 30.1*   MCV 95 97   MCH 29.6 30.3   MCHC 31.3* 31.2*   RDW 13.2 13.2    205       Last Basic Metabolic Panel:  Recent Labs   Lab Test 11/08/21  0638 11/01/21  0545 10/31/21  0547     --  138   POTASSIUM 3.5 4.1 3.7   CHLORIDE 101  --  103   NELSON 9.0  --  8.2*   CO2 26  --  28   BUN 24  --  38*   CR 0.83  --  0.96   GLC 99  --  133*       Liver Function Studies -   Recent Labs   Lab Test 10/30/21  0139 02/02/21  1155   PROTTOTAL 7.9 6.6   ALBUMIN 3.5 3.3*   BILITOTAL 0.4 0.7   ALKPHOS 95 68   AST 21 19   ALT 22 13       TSH   Date Value Ref Range Status   02/02/2021 2.28 0.30 - 5.00 uIU/mL Final       No results found for: A1C    ASSESSMENT/PLAN:  (I11.0) Hypertensive heart disease with heart failure (H)  (I50.32) Chronic diastolic congestive heart failure (H)  (I10) Essential hypertension  (Z86.73) History of CVA (cerebrovascular accident)  (I25.10) Coronary artery disease involving native coronary artery without angina pectoris, unspecified whether native or transplanted heart  Comment: Weights stable. Recent EKG/Echo with no atrial fibrillation. Per cardiology notes: acute (probable) diastolic CHF in a patient with likely chronically elevated LVEDP and a very narrow margin between \"euvolemia\" and pulmonary edema.  Plan:   -isosorbide " mononitrate (IMDUR) ER from 30 mg po daily to 45 mg po daily at previous visit   -metoprolol 25 mg po BID per cardiology could increase to 50 mg po BID. Started with smaller increase 37.5 mg po BID and follow   -losartan 100 mg po daily at previous visit   -HCTZ 25 mg po every other day now 12.5 mg po daily  -HR/BP monitoring from facility   -nitroglycerin prn  -clopidogrel 75 mg po daily   -BMP and CBC periodically           Electronically signed by:  AZAEL Martínez CNP                   Sincerely,        AZAEL Martínez CNP

## 2021-12-06 NOTE — PROGRESS NOTES
[place] [facility]  [place] Medical Record Number:  [id number]  Place of Service where encounter took place:  [facility] () [[id number]]  Chief Complaint   Patient presents with     Nursing Home Acute       HPI:    Ermelinda Pfeiffer  is a 89 year old (10/19/1930), who is being seen today for an episodic care visit.  HPI information obtained from: facility chart records and patient report. Today's concern is:    Seen today for follow up to hypertension. BP have been bettered controlled recently with average 141/72 HR 74-79. Says the medication still makes her tired but she is up, alert, eating lunch and was working with PT later in the day. History of acute on choric CHF likely triggered by marked hypertension x 2. CXR c/w interstitial pulmonary edema and she does not like diuretic therapy as it increases urinary issues.     Past Medical and Surgical History reviewed in Epic today.    MEDICATIONS:    Current Outpatient Medications   Medication Sig Dispense Refill     albuterol (PROAIR HFA/PROVENTIL HFA/VENTOLIN HFA) 108 (90 Base) MCG/ACT inhaler Inhale 2 puffs into the lungs every 4 hours as needed for shortness of breath / dyspnea or wheezing       clopidogrel (PLAVIX) 75 MG tablet Take 75 mg by mouth daily       escitalopram (LEXAPRO) 5 MG tablet Take 5 mg by mouth daily       ferrous fumarate 65 mg, [place]. FE,-Vitamin C 125 mg (VITRON C)  MG TABS tablet Take 1 tablet by mouth daily       fluticasone-salmeterol (ADVAIR) 500-50 MCG/DOSE inhaler Inhale 1 puff into the lungs every 12 hours for Asthma Rinse and spit after each use.       hydrochlorothiazide (HYDRODIURIL) 25 MG tablet Take 0.5 tablets (12.5 mg) by mouth daily  0     hydrocortisone (CORTAID) 1 % external cream Apply topically 2 times daily       isosorbide mononitrate (IMDUR) 30 MG 24 hr tablet Take 1 tablet (30 mg) by mouth daily AND 0.5 tablets (15 mg) daily. 30 tablet 4     lidocaine (LIDODERM) 5 % patch Place 1 patch onto  "the skin daily as needed for moderate pain To prevent lidocaine toxicity, patient should be patch free for 12 hrs daily.       losartan (COZAAR) 50 MG tablet Take 2 tablets (100 mg) by mouth daily 30 tablet 1     metoprolol tartrate (LOPRESSOR) 25 MG tablet Take 1.5 tablets (37.5 mg) by mouth 2 times daily Hold for SBP <100 or HR <60. Goal -130 45 tablet 11     muscle rub (ARTHRITIS HOT) 10-15 % CREA Apply topically 3 times daily Apply to both heels       nitroGLYcerin (NITROSTAT) 0.4 MG sublingual tablet Place 0.4 mg under the tongue every 5 minutes as needed for chest pain For chest pain place 1 tablet under the tongue every 5 minutes for 3 doses. If symptoms persist 5 minutes after 1st dose call 911.       nystatin (MYCOSTATIN) 272591 UNIT/GM external powder Apply topically as needed       pantoprazole (PROTONIX) 40 MG EC tablet Take 40 mg by mouth daily for Esophagitis Before breakfast       rosuvastatin (CRESTOR) 5 MG tablet Take 5 mg by mouth every other day At Bedtime       SENNA-docusate sodium (SENNA S) 8.6-50 MG tablet Take 1 tablet by mouth 2 times daily as needed       tiotropium (SPIRIVA RESPIMAT) 2.5 MCG/ACT inhaler Inhale 2 puffs into the lungs daily 4 g 11     Vitamin D, Cholecalciferol, 25 MCG (1000 UT) TABS Take 2 tablets by mouth daily        zolpidem (AMBIEN) 5 MG tablet Take 1 tablet (5 mg) by mouth At Bedtime 30 tablet 0     REVIEW OF SYSTEMS:  4 point ROS including Respiratory, CV, GI and , other than that noted in the HPI,  is negative    Objective:  BP (!) 141/67   Pulse 74   Temp 97.9  F (36.6  C)   Resp 18   Ht 1.549 m (5' 1\")   Wt 66.2 kg (146 lb)   SpO2 96%   BMI 27.59 kg/m    Exam:  GENERAL APPEARANCE: alert-flat at baseline   ENT:  Mouth and posterior oropharynx normal, dry mucous membranes  EYES:  EOM, conjunctivae WNL,  right eye esotropia, periorbital edema bilateral   RESP: LS grossly clear-no cough or wheeze with exam, some rhonchi at baseline (not today)  CV:  " "trace edema-no compression or socks   ABDOMEN:  no guarding or rebound BS +  M/S:   kyphotic stance, some generalized weakness but at baseline, walks with SBA and 2WW  SKIN:  intact to visualized areas-lesion on forehead is raised, dry and flaky, left nasal lesion-black lesion   NEURO:   Cranial nerves 2-12 are normal tested and grossly at patient's baseline  PSYCH: reduced insight, judgement and memory    Labs:   CBC RESULTS: Recent Labs   Lab Test 11/08/21  0638 11/01/21  0545   WBC 5.1 9.8   RBC 3.41* 3.10*   HGB 10.1* 9.4*   HCT 32.3* 30.1*   MCV 95 97   MCH 29.6 30.3   MCHC 31.3* 31.2*   RDW 13.2 13.2    205       Last Basic Metabolic Panel:  Recent Labs   Lab Test 11/08/21  0638 11/01/21  0545 10/31/21  0547     --  138   POTASSIUM 3.5 4.1 3.7   CHLORIDE 101  --  103   NELSON 9.0  --  8.2*   CO2 26  --  28   BUN 24  --  38*   CR 0.83  --  0.96   GLC 99  --  133*       Liver Function Studies -   Recent Labs   Lab Test 10/30/21  0139 02/02/21  1155   PROTTOTAL 7.9 6.6   ALBUMIN 3.5 3.3*   BILITOTAL 0.4 0.7   ALKPHOS 95 68   AST 21 19   ALT 22 13       TSH   Date Value Ref Range Status   02/02/2021 2.28 0.30 - 5.00 uIU/mL Final       No results found for: A1C    ASSESSMENT/PLAN:  (I11.0) Hypertensive heart disease with heart failure (H)  (I50.32) Chronic diastolic congestive heart failure (H)  (I10) Essential hypertension  (Z86.73) History of CVA (cerebrovascular accident)  (I25.10) Coronary artery disease involving native coronary artery without angina pectoris, unspecified whether native or transplanted heart  Comment: Weights stable. Recent EKG/Echo with no atrial fibrillation. Per cardiology notes: acute (probable) diastolic CHF in a patient with likely chronically elevated LVEDP and a very narrow margin between \"euvolemia\" and pulmonary edema.  Plan:   -isosorbide mononitrate (IMDUR) ER from 30 mg po daily to 45 mg po daily at previous visit   -metoprolol 25 mg po BID per cardiology could increase " to 50 mg po BID. Started with smaller increase 37.5 mg po BID and follow   -losartan 100 mg po daily at previous visit   -HCTZ 25 mg po every other day now 12.5 mg po daily  -HR/BP monitoring from facility   -nitroglycerin prn  -clopidogrel 75 mg po daily   -BMP and CBC periodically           Electronically signed by:  AZAEL Martínez CNP

## 2021-12-25 NOTE — PROGRESS NOTES
Pt was seen for a regulatory LTC visit    Course reviewed in Epic    Pt states she feels fairly well, though is tired much of the time, she believes, secondary to medications  She has mild, chronic JUNG  She denies SOB at rest or chest pain  She denies LE edema    VSS  Alert, sitting in room, watching television  Dysconjugate gaze  Thoracic kyphosis  Lungs clear  CV rrr  Abd soft  No LE edema      Assessment    Chronic diastolic CHF, with history of acute exacerbations in the setting of uncontrolled HTN, stable over the last several months, on multi-drug regimen, including Imdur, metoprolol, losartan and hydrochlorothiazide    History CVA, neuro status stable, on plavix, statin    Asthma, stable on current inhaler regimen    Plan  Continue current cares  Monitor wt, exam, BP, HR and BMP

## 2022-01-01 ENCOUNTER — NURSING HOME VISIT (OUTPATIENT)
Dept: GERIATRICS | Facility: CLINIC | Age: 87
End: 2022-01-01
Payer: MEDICARE

## 2022-01-01 ENCOUNTER — TELEPHONE (OUTPATIENT)
Dept: GERIATRICS | Facility: CLINIC | Age: 87
End: 2022-01-01
Payer: MEDICARE

## 2022-01-01 ENCOUNTER — LAB REQUISITION (OUTPATIENT)
Dept: LAB | Facility: CLINIC | Age: 87
End: 2022-01-01
Payer: MEDICARE

## 2022-01-01 VITALS
SYSTOLIC BLOOD PRESSURE: 131 MMHG | BODY MASS INDEX: 27.56 KG/M2 | HEIGHT: 61 IN | OXYGEN SATURATION: 97 % | RESPIRATION RATE: 18 BRPM | TEMPERATURE: 97.6 F | DIASTOLIC BLOOD PRESSURE: 76 MMHG | HEART RATE: 75 BPM | WEIGHT: 146 LBS

## 2022-01-01 VITALS
HEART RATE: 73 BPM | RESPIRATION RATE: 18 BRPM | SYSTOLIC BLOOD PRESSURE: 134 MMHG | OXYGEN SATURATION: 95 % | DIASTOLIC BLOOD PRESSURE: 72 MMHG | TEMPERATURE: 97.5 F | BODY MASS INDEX: 28.89 KG/M2 | HEIGHT: 61 IN | WEIGHT: 153 LBS

## 2022-01-01 VITALS
RESPIRATION RATE: 16 BRPM | WEIGHT: 146.9 LBS | HEIGHT: 61 IN | TEMPERATURE: 97.4 F | HEART RATE: 78 BPM | BODY MASS INDEX: 27.73 KG/M2 | OXYGEN SATURATION: 96 % | DIASTOLIC BLOOD PRESSURE: 77 MMHG | SYSTOLIC BLOOD PRESSURE: 148 MMHG

## 2022-01-01 DIAGNOSIS — N39.0 URINARY TRACT INFECTION WITHOUT HEMATURIA, SITE UNSPECIFIED: Primary | ICD-10-CM

## 2022-01-01 DIAGNOSIS — M62.81 MUSCLE WEAKNESS (GENERALIZED): ICD-10-CM

## 2022-01-01 DIAGNOSIS — F51.01 PRIMARY INSOMNIA: ICD-10-CM

## 2022-01-01 DIAGNOSIS — R54 FRAIL ELDERLY: ICD-10-CM

## 2022-01-01 DIAGNOSIS — I50.33 ACUTE ON CHRONIC DIASTOLIC CONGESTIVE HEART FAILURE (H): ICD-10-CM

## 2022-01-01 DIAGNOSIS — I70.0 ATHEROSCLEROSIS OF AORTA (H): ICD-10-CM

## 2022-01-01 DIAGNOSIS — R06.02 SHORTNESS OF BREATH: ICD-10-CM

## 2022-01-01 DIAGNOSIS — G47.00 PERSISTENT INSOMNIA: ICD-10-CM

## 2022-01-01 DIAGNOSIS — I10 ESSENTIAL (PRIMARY) HYPERTENSION: ICD-10-CM

## 2022-01-01 DIAGNOSIS — A41.9 SEPSIS SECONDARY TO UTI (H): Primary | ICD-10-CM

## 2022-01-01 DIAGNOSIS — I11.0 HYPERTENSIVE HEART DISEASE WITH HEART FAILURE (H): ICD-10-CM

## 2022-01-01 DIAGNOSIS — I10 ESSENTIAL HYPERTENSION: ICD-10-CM

## 2022-01-01 DIAGNOSIS — Z51.5 END OF LIFE CARE: ICD-10-CM

## 2022-01-01 DIAGNOSIS — R52 PAIN: Primary | ICD-10-CM

## 2022-01-01 DIAGNOSIS — M62.81 GENERALIZED MUSCLE WEAKNESS: ICD-10-CM

## 2022-01-01 DIAGNOSIS — Z86.73 HISTORY OF CVA (CEREBROVASCULAR ACCIDENT): ICD-10-CM

## 2022-01-01 DIAGNOSIS — I48.92 ATRIAL FLUTTER, UNSPECIFIED TYPE (H): ICD-10-CM

## 2022-01-01 DIAGNOSIS — R53.81 PHYSICAL DECONDITIONING: ICD-10-CM

## 2022-01-01 DIAGNOSIS — K21.00 GASTROESOPHAGEAL REFLUX DISEASE WITH ESOPHAGITIS, UNSPECIFIED WHETHER HEMORRHAGE: ICD-10-CM

## 2022-01-01 DIAGNOSIS — N39.0 SEPSIS SECONDARY TO UTI (H): Primary | ICD-10-CM

## 2022-01-01 DIAGNOSIS — J96.01 ACUTE RESPIRATORY FAILURE WITH HYPOXIA (H): ICD-10-CM

## 2022-01-01 DIAGNOSIS — R94.6 ABNORMAL RESULTS OF THYROID FUNCTION STUDIES: ICD-10-CM

## 2022-01-01 DIAGNOSIS — I50.32 CHRONIC DIASTOLIC CONGESTIVE HEART FAILURE (H): ICD-10-CM

## 2022-01-01 DIAGNOSIS — I25.10 CORONARY ARTERY DISEASE INVOLVING NATIVE CORONARY ARTERY WITHOUT ANGINA PECTORIS, UNSPECIFIED WHETHER NATIVE OR TRANSPLANTED HEART: ICD-10-CM

## 2022-01-01 DIAGNOSIS — R52 PAIN: ICD-10-CM

## 2022-01-01 DIAGNOSIS — F33.0 MILD EPISODE OF RECURRENT MAJOR DEPRESSIVE DISORDER (H): ICD-10-CM

## 2022-01-01 DIAGNOSIS — J45.40 MODERATE PERSISTENT ASTHMA WITHOUT COMPLICATION: ICD-10-CM

## 2022-01-01 DIAGNOSIS — R30.0 DYSURIA: ICD-10-CM

## 2022-01-01 DIAGNOSIS — F41.9 ANXIETY: Primary | ICD-10-CM

## 2022-01-01 DIAGNOSIS — J44.9 CHRONIC OBSTRUCTIVE PULMONARY DISEASE, UNSPECIFIED COPD TYPE (H): ICD-10-CM

## 2022-01-01 DIAGNOSIS — R79.89 ELEVATED TSH: ICD-10-CM

## 2022-01-01 DIAGNOSIS — I48.20 CHRONIC ATRIAL FIBRILLATION (H): ICD-10-CM

## 2022-01-01 DIAGNOSIS — F41.9 ANXIETY: ICD-10-CM

## 2022-01-01 LAB
ALBUMIN UR-MCNC: NEGATIVE MG/DL
AMORPH CRY #/AREA URNS HPF: ABNORMAL /HPF
ANION GAP SERPL CALCULATED.3IONS-SCNC: 6 MMOL/L (ref 5–18)
APPEARANCE UR: ABNORMAL
BACTERIA #/AREA URNS HPF: ABNORMAL /HPF
BACTERIA UR CULT: ABNORMAL
BILIRUB UR QL STRIP: NEGATIVE
BUN SERPL-MCNC: 13 MG/DL (ref 8–28)
CALCIUM SERPL-MCNC: 9.1 MG/DL (ref 8.5–10.5)
CHLORIDE BLD-SCNC: 107 MMOL/L (ref 98–107)
CO2 SERPL-SCNC: 27 MMOL/L (ref 22–31)
COLOR UR AUTO: ABNORMAL
CREAT SERPL-MCNC: 0.82 MG/DL (ref 0.6–1.1)
ERYTHROCYTE [DISTWIDTH] IN BLOOD BY AUTOMATED COUNT: 13.8 % (ref 10–15)
GFR SERPL CREATININE-BSD FRML MDRD: 67 ML/MIN/1.73M2
GLUCOSE BLD-MCNC: 99 MG/DL (ref 70–125)
GLUCOSE UR STRIP-MCNC: NEGATIVE MG/DL
HCT VFR BLD AUTO: 32.4 % (ref 35–47)
HGB BLD-MCNC: 10 G/DL (ref 11.7–15.7)
HGB UR QL STRIP: NEGATIVE
KETONES UR STRIP-MCNC: NEGATIVE MG/DL
LEUKOCYTE ESTERASE UR QL STRIP: ABNORMAL
MCH RBC QN AUTO: 29.8 PG (ref 26.5–33)
MCHC RBC AUTO-ENTMCNC: 30.9 G/DL (ref 31.5–36.5)
MCV RBC AUTO: 96 FL (ref 78–100)
MUCOUS THREADS #/AREA URNS LPF: PRESENT /LPF
NITRATE UR QL: NEGATIVE
PH UR STRIP: 6 [PH] (ref 5–7)
PLATELET # BLD AUTO: 278 10E3/UL (ref 150–450)
POTASSIUM BLD-SCNC: 4 MMOL/L (ref 3.5–5)
RBC # BLD AUTO: 3.36 10E6/UL (ref 3.8–5.2)
RBC URINE: 1 /HPF
SODIUM SERPL-SCNC: 140 MMOL/L (ref 136–145)
SP GR UR STRIP: 1.01 (ref 1–1.03)
SQUAMOUS EPITHELIAL: 3 /HPF
T4 FREE SERPL-MCNC: 0.91 NG/DL (ref 0.7–1.8)
TSH SERPL DL<=0.005 MIU/L-ACNC: 3.14 UIU/ML (ref 0.3–5)
UROBILINOGEN UR STRIP-MCNC: <2 MG/DL
WBC # BLD AUTO: 6.5 10E3/UL (ref 4–11)
WBC URINE: >182 /HPF

## 2022-01-01 PROCEDURE — 85027 COMPLETE CBC AUTOMATED: CPT | Performed by: INTERNAL MEDICINE

## 2022-01-01 PROCEDURE — 87086 URINE CULTURE/COLONY COUNT: CPT | Mod: ORL | Performed by: INTERNAL MEDICINE

## 2022-01-01 PROCEDURE — 99309 SBSQ NF CARE MODERATE MDM 30: CPT

## 2022-01-01 PROCEDURE — 36415 COLL VENOUS BLD VENIPUNCTURE: CPT | Mod: ORL

## 2022-01-01 PROCEDURE — 84443 ASSAY THYROID STIM HORMONE: CPT | Mod: ORL

## 2022-01-01 PROCEDURE — 81001 URINALYSIS AUTO W/SCOPE: CPT | Mod: ORL | Performed by: INTERNAL MEDICINE

## 2022-01-01 PROCEDURE — 99318 PR ANNUAL NURSING FAC ASSESSMNT, STABLE: CPT

## 2022-01-01 PROCEDURE — 36415 COLL VENOUS BLD VENIPUNCTURE: CPT | Performed by: INTERNAL MEDICINE

## 2022-01-01 PROCEDURE — 82310 ASSAY OF CALCIUM: CPT | Performed by: INTERNAL MEDICINE

## 2022-01-01 PROCEDURE — 84439 ASSAY OF FREE THYROXINE: CPT | Mod: ORL

## 2022-01-01 PROCEDURE — P9604 ONE-WAY ALLOW PRORATED TRIP: HCPCS | Performed by: INTERNAL MEDICINE

## 2022-01-01 PROCEDURE — P9604 ONE-WAY ALLOW PRORATED TRIP: HCPCS | Mod: ORL

## 2022-01-01 RX ORDER — ACETAMINOPHEN 325 MG/1
650 TABLET ORAL EVERY 4 HOURS PRN
Start: 2022-01-01

## 2022-01-01 RX ORDER — CHOLECALCIFEROL (VITAMIN D3) 50 MCG
1 TABLET ORAL DAILY
COMMUNITY

## 2022-01-01 RX ORDER — LORAZEPAM 2 MG/ML
1 CONCENTRATE ORAL 3 TIMES DAILY
Qty: 30 ML | Refills: 0 | Status: SHIPPED | OUTPATIENT
Start: 2022-01-01

## 2022-01-01 RX ORDER — LORAZEPAM 2 MG/ML
1 CONCENTRATE ORAL EVERY 8 HOURS PRN
Qty: 30 ML | Refills: 0 | Status: SHIPPED | OUTPATIENT
Start: 2022-01-01

## 2022-01-01 RX ORDER — ISOSORBIDE MONONITRATE 30 MG/1
45 TABLET, EXTENDED RELEASE ORAL DAILY
COMMUNITY

## 2022-01-01 RX ORDER — CEFUROXIME AXETIL 500 MG/1
500 TABLET ORAL 2 TIMES DAILY
COMMUNITY
Start: 2022-01-01 | End: 2022-01-01

## 2022-01-01 RX ORDER — ZOLPIDEM TARTRATE 6.25 MG/1
6.25 TABLET, FILM COATED, EXTENDED RELEASE ORAL
Qty: 60 TABLET | Refills: 2 | Status: SHIPPED | OUTPATIENT
Start: 2022-01-01 | End: 2022-01-01

## 2022-01-01 RX ORDER — HYDRALAZINE HYDROCHLORIDE 10 MG/1
10 TABLET, FILM COATED ORAL 3 TIMES DAILY
Qty: 180 TABLET | Refills: 1
Start: 2022-01-01

## 2022-01-01 RX ORDER — MORPHINE SULFATE 10 MG/5ML
0.5 SOLUTION ORAL EVERY 4 HOURS PRN
Qty: 4.5 ML | Refills: 0 | Status: SHIPPED | OUTPATIENT
Start: 2022-01-01 | End: 2022-01-01

## 2022-01-01 RX ORDER — ZOLPIDEM TARTRATE 5 MG/1
5 TABLET ORAL AT BEDTIME
Qty: 30 TABLET | Refills: 0 | Status: SHIPPED | OUTPATIENT
Start: 2022-01-01 | End: 2022-01-01

## 2022-01-01 RX ORDER — ZOLPIDEM TARTRATE 6.25 MG/1
6.25 TABLET, FILM COATED, EXTENDED RELEASE ORAL
Qty: 60 TABLET | Refills: 2 | Status: SHIPPED | OUTPATIENT
Start: 2022-01-01

## 2022-01-01 RX ORDER — METOPROLOL TARTRATE 50 MG
50 TABLET ORAL 2 TIMES DAILY
COMMUNITY

## 2022-01-01 RX ORDER — NITROFURANTOIN 25; 75 MG/1; MG/1
100 CAPSULE ORAL 2 TIMES DAILY
Qty: 10 CAPSULE | Refills: 0
Start: 2022-01-01 | End: 2022-01-01

## 2022-01-01 RX ORDER — MORPHINE SULFATE 100 MG/5ML
2.5 SOLUTION ORAL EVERY 4 HOURS PRN
Qty: 15 ML | Refills: 0 | Status: SHIPPED | OUTPATIENT
Start: 2022-01-01 | End: 2022-04-21

## 2022-01-01 ASSESSMENT — MIFFLIN-ST. JEOR
SCORE: 1018.71
SCORE: 1014.63

## 2022-02-01 NOTE — PROGRESS NOTES
[facility]IATR[facility]  Chief Complaint   Patient presents with     Annual Comprehensive Nursing Home     [place] Medical Record Number:  [id number]  Place of Service where encounter took place:  [facility] () [[id number]]    HPI:    Ermelinda Pfeiffer  is a 89 year old  (10/19/1930), who is being seen today for an annual comprehensive visit. HPI information obtained from: facility chart records, facility staff, patient report, [facility] chart review, Care Everywhere Epic chart review and family/first contact daugher report. Today's concern is:    Past medical history includes hyperlipidemia, CVA, COPD, asthma, GERD, depression, hypertension, and insomnia. Her main concern today is urine frequeny.  Preliminary urine culture just showed >100,000 CFU/mL Escherichia coli. Denies bloody urine or pain.  Reports fair appetite.  Weight stable.  Hemodynamically stable.    Also, c/o not getting enough sleep.  Denies headache, dizziness, chest pain, or shortness of breath.    Per daughter, patient is more weaker than usual over the last 2 weeks. Denies falls or trauma.  No additional acute concerns from staff or patient/family.    ALLERGIES: Sulfamethoxazole-trimethoprim, Levofloxacin, Clarithromycin, Mirtazapine, and Penicillin g  PAST MEDICAL HISTORY:   Past Medical History:   Diagnosis Date     Acute sinus infection      Anemia      CAD (coronary artery disease)      Cancer of the skin, basal cell      Cataract      Depressive disorder      Diplopia      Disturbance, sleep      GERD (gastroesophageal reflux disease)      HTN (hypertension)      Hyperlipidemia      Insomnia      Myocardial infarction (H)      Osteoporosis      Skin cancer      Status post parathyroidectomy (H) 05/23/2016    1969: Partial parathyroidectomy for hyperparathyroidism History of hypercalcemia in setting of benign parathyroid tumor.  No recurrence of hypercalcemia since     Stone, kidney      Transient ischemic attack        PAST SURGICAL HISTORY:  has a past surgical history that includes Parathyroidectomy (1969) and PERCUTANEOUS TRANSLUMINAL BALLOON ANGIOPLASTY WITH INSERTION OF STENT INTO CORONARY ARTERY N/A  (10/29/1997).  IMMUNIZATIONS:  Immunization History   Administered Date(s) Administered     COVID-19,PF,Moderna 01/08/2021, 02/05/2021, 11/29/2021     Influenza (High Dose) 3 valent vaccine 01/06/2017, 11/20/2018, 12/10/2018, 10/23/2019, 11/03/2020, 11/18/2021     Pneumo Conj 13-V (2010&after) 02/26/2020     Pneumococcal 23 valent 03/10/2000     TDAP Vaccine (Adacel) 10/30/2012     Td (Adult), Adsorbed 09/22/2000, 09/12/2002     Tdap (Adult) Unspecified Formulation 09/22/2000     Above immunizations pulled from [facility]. [facility] and facility records also reconciled. Outstanding information sent to  to update [facility]. Future immunizations are not needed at this point as all recommended immunizations are up to date.     Current Outpatient Medications:      albuterol (PROAIR HFA/PROVENTIL HFA/VENTOLIN HFA) 108 (90 Base) MCG/ACT inhaler, Inhale 2 puffs into the lungs every 4 hours as needed for shortness of breath / dyspnea or wheezing, Disp: , Rfl:      clopidogrel (PLAVIX) 75 MG tablet, Take 75 mg by mouth daily, Disp: , Rfl:      escitalopram (LEXAPRO) 5 MG tablet, Take 5 mg by mouth daily, Disp: , Rfl:      ferrous fumarate 65 mg, [place]. FE,-Vitamin C 125 mg (VITRON C)  MG TABS tablet, Take 1 tablet by mouth daily, Disp: , Rfl:      fluticasone-salmeterol (ADVAIR) 500-50 MCG/DOSE inhaler, Inhale 1 puff into the lungs every 12 hours for Asthma Rinse and spit after each use., Disp: , Rfl:      hydrochlorothiazide (HYDRODIURIL) 25 MG tablet, Take 0.5 tablets (12.5 mg) by mouth daily, Disp: , Rfl: 0     hydrocortisone (CORTAID) 1 % external cream, Apply topically 2 times daily, Disp: , Rfl:      isosorbide mononitrate (IMDUR) 30 MG 24 hr tablet, Take 45 mg by mouth daily, Disp: , Rfl:      lidocaine  (LIDODERM) 5 % patch, Place 1 patch onto the skin daily as needed for moderate pain To prevent lidocaine toxicity, patient should be patch free for 12 hrs daily., Disp: , Rfl:      losartan (COZAAR) 50 MG tablet, Take 2 tablets (100 mg) by mouth daily, Disp: 30 tablet, Rfl: 1     metoprolol tartrate (LOPRESSOR) 25 MG tablet, Take 1.5 tablets (37.5 mg) by mouth 2 times daily Hold for SBP <100 or HR <60. Goal -130, Disp: 45 tablet, Rfl: 11     muscle rub (ARTHRITIS HOT) 10-15 % CREA, Apply topically 3 times daily Apply to both heels, Disp: , Rfl:      nitroFURantoin macrocrystal-monohydrate (MACROBID) 100 MG capsule, Take 1 capsule (100 mg) by mouth 2 times daily for 5 days, Disp: 10 capsule, Rfl: 0     nitroGLYcerin (NITROSTAT) 0.4 MG sublingual tablet, Place 0.4 mg under the tongue every 5 minutes as needed for chest pain For chest pain place 1 tablet under the tongue every 5 minutes for 3 doses. If symptoms persist 5 minutes after 1st dose call 911., Disp: , Rfl:      nystatin (MYCOSTATIN) 861629 UNIT/GM external powder, Apply topically as needed, Disp: , Rfl:      pantoprazole (PROTONIX) 40 MG EC tablet, Take 40 mg by mouth daily for Esophagitis Before breakfast, Disp: , Rfl:      rosuvastatin (CRESTOR) 5 MG tablet, Take 5 mg by mouth every other day At Bedtime, Disp:  , Rfl:      SENNA-docusate sodium (SENNA S) 8.6-50 MG tablet, Take 1 tablet by mouth 2 times daily as needed, Disp: , Rfl:      tiotropium (SPIRIVA RESPIMAT) 2.5 MCG/ACT inhaler, Inhale 2 puffs into the lungs daily, Disp: 4 g, Rfl: 11     vitamin D3 (CHOLECALCIFEROL) 50 mcg (2000 units) tablet, Take 1 tablet by mouth daily, Disp: , Rfl:      zolpidem (AMBIEN) 5 MG tablet, Take 1 tablet (5 mg) by mouth At Bedtime, Disp: 30 tablet, Rfl: 0     zolpidem ER (AMBIEN CR) 6.25 MG CR tablet, Take 1 tablet (6.25 mg) by mouth nightly as needed for sleep, Disp: 60 tablet, Rfl: 2     isosorbide mononitrate (IMDUR) 30 MG 24 hr tablet, Take 1 tablet (30 mg)  "by mouth daily AND 0.5 tablets (15 mg) daily., Disp: 30 tablet, Rfl: 4     Vitamin D, Cholecalciferol, 25 MCG (1000 UT) TABS, Take 2 tablets by mouth daily , Disp: , Rfl:      Case Management:  I have reviewed the facility/SNF care plan/MDS, including the falls risk, nutrition and pain screening. I also reviewed the current immunizations, and preventive care.. Future cancer screening is not clinically indicated secondary to age/goals of care Patient's desire to return to the community is present, but is not able due to care needs . Current Level of Care is appropriate.    Advance Directive Discussion:    I reviewed the current advanced directives as reflected in EPIC, the POLST and the facility chart, and verified the congruency of orders. I contacted the first party daughter and discussed the plan of Care.  I did review the advance directives with the resident. Patient's goal is, functional pain control and comfort.    Team Discussion:  I communicated with the appropriate disciplines involved with the Plan of Care:   Nursing    Information reviewed:  Medications, vital signs, orders, and nursing notes.    ROS:  10 point ROS of systems including Constitutional, Eyes, Respiratory, Cardiovascular, Gastroenterology, Genitourinary, Integumentary, Musculoskeletal, Psychiatric were all negative except for pertinent positives noted in my HPI.    Vitals:  /76   Pulse 75   Temp 97.6  F (36.4  C)   Resp 18   Ht 1.549 m (5' 1\")   Wt 66.2 kg (146 lb)   SpO2 97%   BMI 27.59 kg/m   Body mass index is 27.59 kg/m .     Exam:  GENERAL APPEARANCE:  Alert, in no distress, pleasant   ENT:  Mouth and posterior oropharynx normal, dry mucous membranes  EYES:  EOM, conjunctivae, right eye esotropia  RESP:  respiratory effort and palpation of chest normal, lungs clear  CV:  Palpation and auscultation of heart done , regular rate and rhythm, no murmur, rub, or gallop, no edema   ABDOMEN:  no guarding or rebound, active " BS  M/S:   kyphotic stance, generalized weakness, able to move all extremities   SKIN:  intact to visualized areas, warm to touch   NEURO:   Cranial nerves 2-12 are normal tested and grossly at patient's baseline  PSYCH:  normal insight, judgement and memory    Lab/Diagnostic data:     Last Comprehensive Metabolic Panel:  Sodium   Date Value Ref Range Status   11/08/2021 139 136 - 145 mmol/L Final   04/30/2021 141 136 - 141 mmol/L Final     Potassium   Date Value Ref Range Status   11/08/2021 3.5 3.5 - 5.0 mmol/L Final   04/30/2021 3.7 3.5 - 5.0 mmol/L Final     Chloride   Date Value Ref Range Status   11/08/2021 101 98 - 107 mmol/L Final   04/30/2021 103 98 - 107 mmol/L Final     Carbon Dioxide   Date Value Ref Range Status   04/30/2021 32 (A) 22 - 31 mmol/L Final     Carbon Dioxide (CO2)   Date Value Ref Range Status   11/08/2021 26 22 - 31 mmol/L Final     Anion Gap   Date Value Ref Range Status   11/08/2021 12 5 - 18 mmol/L Final   04/30/2021 6 5 - 18 mmol/L Final     Glucose   Date Value Ref Range Status   11/08/2021 99 70 - 125 mg/dL Final   04/30/2021 97 70 - 125 mg/dL Final     Urea Nitrogen   Date Value Ref Range Status   11/08/2021 24 8 - 28 mg/dL Final   04/30/2021 24 8 - 28 mg/dL Final     Creatinine   Date Value Ref Range Status   11/08/2021 0.83 0.60 - 1.10 mg/dL Final   04/30/2021 0.81 0.60 - 1.10 mg/dL Final     GFR Estimate   Date Value Ref Range Status   11/08/2021 62 >60 mL/min/1.73m2 Final     Comment:     As of July 11, 2021, eGFR is calculated by the CKD-EPI creatinine equation, without race adjustment. eGFR can be influenced by muscle mass, exercise, and diet. The reported eGFR is an estimation only and is only applicable if the renal function is stable.   05/25/2021 >60 >60 mL/min/1.73m2 Final   04/30/2021 >60 >60 ml/min/1.73m2 Final     Calcium   Date Value Ref Range Status   11/08/2021 9.0 8.5 - 10.5 mg/dL Final   04/30/2021 8.9 8.5 - 10.5 mg/dL Final     CBC RESULTS: Recent Labs   Lab Test  11/08/21  0638   WBC 5.1   RBC 3.41*   HGB 10.1*   HCT 32.3*   MCV 95   MCH 29.6   MCHC 31.3*   RDW 13.2        TSH   Date Value Ref Range Status   02/02/2021 2.28 0.30 - 5.00 uIU/mL Final      Weight      BP      HR      ASSESSMENT/PLAN  (N39.0) Urinary tract infection without hematuria, site unspecified  (primary encounter diagnosis)  Comment:  -Acute  -Recurrent UTIs  Plan:   -NitroFURantoin macrocrystal-monohydrate         (MACROBID) 100 MG capsule BID x5 days  -Hydration  -Monitor for symptoms and notify providers if worsens    (G47.00) Persistent insomnia  Comment:   -Was on Ambien 5 mg  Plan:   -Switch to zolpidem ER (AMBIEN CR) 6.25 MG CR tablet daily at HS  -Monitor for changes in sleep habits    (R53.81) Physical deconditioning  (M62.81) Muscle weakness (generalized)  (R54) Frail elderly  Comment: No reports of recent fall or trauma  Plan:  -OT/PT  -Monitor for safety and prevent falls    (I11.0) Hypertensive heart disease with heart failure (H)  (I50.32) Chronic diastolic congestive heart failure (H)  (I10) Essential hypertension  (Z86.73) History of CVA (cerebrovascular accident)  (I48.20) Chronic atrial fibrillation (H)  (I70.0) Atherosclerosis of aorta (H)  (I25.10) Coronary artery disease involving native coronary artery without angina pectoris, unspecified whether native or transplanted heart  Comment:  -Hospitalized in 10/2021 for acute hypoxic respiratory failure secondary to acute on chronic CHF likely triggered by elevated BP and UTI  -BP and HR as above  -No chest pain or shortness of breath this morning  -Weight stable  Plan:   -Continue Plavix, losartan, isosorbide, metoprolol and hydrochlorothiazide  -BMP periodically  -Monitor BP and HR  -Labs periodically  -Cardiology follow-up if warranted  -Monitor for exacerbations    (J44.9) Chronic obstructive pulmonary disease, unspecified COPD type (H)  (J45.40) Moderate persistent asthma without complication  Comment: Chronic  Plan:    -Continue Ventolin HFA aerosol, Spiriva-Respimat, and Advair  -Monitor for changes in respiratory status    (K21.00) Gastroesophageal reflux disease with esophagitis, unspecified whether hemorrhage  Comment: Chronic  Plan:   -Continue pantoprazole  -Monitor for signs and symptoms of heartburn    (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

## 2022-02-02 NOTE — LETTER
2/2/2022        RE: Ermelinda Raman [facility]  [address] [facility] [address]        No notes on file      Sincerely,        AZAEL Senior CNP

## 2022-02-02 NOTE — LETTER
2/2/2022        RE: Ermelinda Pfeiffer  [facility]  [address]  [facility] [address]        [facility] GERIATRICS  Chief Complaint   Patient presents with     Annual Comprehensive Nursing Home     [facility] Record Number:  [id number]  Place of Service where encounter took place:  [facility] () [[id number]]    HPI:    Ermelinda Pfeiffer  is a 89 year old  (10/19/1930), who is being seen today for an annual comprehensive visit. HPI information obtained from: facility chart records, facility staff, patient report, [facility] chart review, Care Everywhere Epic chart review and family/first contact daugher report. Today's concern is:    Past medical history includes hyperlipidemia, CVA, COPD, asthma, GERD, depression, hypertension, and insomnia. Her main concern today is urine frequeny.  Preliminary urine culture just showed >100,000 CFU/mL Escherichia coli. Denies bloody urine or pain.  Reports fair appetite.  Weight stable.  Hemodynamically stable.    Also, c/o not getting enough sleep.  Denies headache, dizziness, chest pain, or shortness of breath.    Per daughter, patient is more weaker than usual over the last 2 weeks. Denies falls or trauma.  No additional acute concerns from staff or patient/family.    ALLERGIES: Sulfamethoxazole-trimethoprim, Levofloxacin, Clarithromycin, Mirtazapine, and Penicillin g  PAST MEDICAL HISTORY:   Past Medical History:   Diagnosis Date     Acute sinus infection      Anemia      CAD (coronary artery disease)      Cancer of the skin, basal cell      Cataract      Depressive disorder      Diplopia      Disturbance, sleep      GERD (gastroesophageal reflux disease)      HTN (hypertension)      Hyperlipidemia      Insomnia      Myocardial infarction (H)      Osteoporosis      Skin cancer      Status post parathyroidectomy (H) 05/23/2016    1969: Partial parathyroidectomy for hyperparathyroidism History of hypercalcemia in setting of  benign parathyroid tumor.  No recurrence of hypercalcemia since     Stone, kidney      Transient ischemic attack       PAST SURGICAL HISTORY:  has a past surgical history that includes Parathyroidectomy (1969) and PERCUTANEOUS TRANSLUMINAL BALLOON ANGIOPLASTY WITH INSERTION OF STENT INTO CORONARY ARTERY N/A  (10/29/1997).  IMMUNIZATIONS:  Immunization History   Administered Date(s) Administered     COVID-19,PF,Moderna 01/08/2021, 02/05/2021, 11/29/2021     Influenza (High Dose) 3 valent vaccine 01/06/2017, 11/20/2018, 12/10/2018, 10/23/2019, 11/03/2020, 11/18/2021     Pneumo Conj 13-V (2010&after) 02/26/2020     Pneumococcal 23 valent 03/10/2000     TDAP Vaccine (Adacel) 10/30/2012     Td (Adult), Adsorbed 09/22/2000, 09/12/2002     Tdap (Adult) Unspecified Formulation 09/22/2000     Above immunizations pulled from [facility]. [facility] and facility records also reconciled. Outstanding information sent to  to update [facility]. Future immunizations are not needed at this point as all recommended immunizations are up to date.     Current Outpatient Medications:      albuterol (PROAIR HFA/PROVENTIL HFA/VENTOLIN HFA) 108 (90 Base) MCG/ACT inhaler, Inhale 2 puffs into the lungs every 4 hours as needed for shortness of breath / dyspnea or wheezing, Disp: , Rfl:      clopidogrel (PLAVIX) 75 MG tablet, Take 75 mg by mouth daily, Disp: , Rfl:      escitalopram (LEXAPRO) 5 MG tablet, Take 5 mg by mouth daily, Disp: , Rfl:      ferrous fumarate 65 mg, [place]. FE,-Vitamin C 125 mg (VITRON C)  MG TABS tablet, Take 1 tablet by mouth daily, Disp: , Rfl:      fluticasone-salmeterol (ADVAIR) 500-50 MCG/DOSE inhaler, Inhale 1 puff into the lungs every 12 hours for Asthma Rinse and spit after each use., Disp: , Rfl:      hydrochlorothiazide (HYDRODIURIL) 25 MG tablet, Take 0.5 tablets (12.5 mg) by mouth daily, Disp: , Rfl: 0     hydrocortisone (CORTAID) 1 % external cream, Apply topically 2 times daily, Disp: ,  Rfl:      isosorbide mononitrate (IMDUR) 30 MG 24 hr tablet, Take 45 mg by mouth daily, Disp: , Rfl:      lidocaine (LIDODERM) 5 % patch, Place 1 patch onto the skin daily as needed for moderate pain To prevent lidocaine toxicity, patient should be patch free for 12 hrs daily., Disp: , Rfl:      losartan (COZAAR) 50 MG tablet, Take 2 tablets (100 mg) by mouth daily, Disp: 30 tablet, Rfl: 1     metoprolol tartrate (LOPRESSOR) 25 MG tablet, Take 1.5 tablets (37.5 mg) by mouth 2 times daily Hold for SBP <100 or HR <60. Goal -130, Disp: 45 tablet, Rfl: 11     muscle rub (ARTHRITIS HOT) 10-15 % CREA, Apply topically 3 times daily Apply to both heels, Disp: , Rfl:      nitroFURantoin macrocrystal-monohydrate (MACROBID) 100 MG capsule, Take 1 capsule (100 mg) by mouth 2 times daily for 5 days, Disp: 10 capsule, Rfl: 0     nitroGLYcerin (NITROSTAT) 0.4 MG sublingual tablet, Place 0.4 mg under the tongue every 5 minutes as needed for chest pain For chest pain place 1 tablet under the tongue every 5 minutes for 3 doses. If symptoms persist 5 minutes after 1st dose call 911., Disp: , Rfl:      nystatin (MYCOSTATIN) 635839 UNIT/GM external powder, Apply topically as needed, Disp: , Rfl:      pantoprazole (PROTONIX) 40 MG EC tablet, Take 40 mg by mouth daily for Esophagitis Before breakfast, Disp: , Rfl:      rosuvastatin (CRESTOR) 5 MG tablet, Take 5 mg by mouth every other day At Bedtime, Disp:  , Rfl:      SENNA-docusate sodium (SENNA S) 8.6-50 MG tablet, Take 1 tablet by mouth 2 times daily as needed, Disp: , Rfl:      tiotropium (SPIRIVA RESPIMAT) 2.5 MCG/ACT inhaler, Inhale 2 puffs into the lungs daily, Disp: 4 g, Rfl: 11     vitamin D3 (CHOLECALCIFEROL) 50 mcg (2000 units) tablet, Take 1 tablet by mouth daily, Disp: , Rfl:      zolpidem (AMBIEN) 5 MG tablet, Take 1 tablet (5 mg) by mouth At Bedtime, Disp: 30 tablet, Rfl: 0     zolpidem ER (AMBIEN CR) 6.25 MG CR tablet, Take 1 tablet (6.25 mg) by mouth nightly as  "needed for sleep, Disp: 60 tablet, Rfl: 2     isosorbide mononitrate (IMDUR) 30 MG 24 hr tablet, Take 1 tablet (30 mg) by mouth daily AND 0.5 tablets (15 mg) daily., Disp: 30 tablet, Rfl: 4     Vitamin D, Cholecalciferol, 25 MCG (1000 UT) TABS, Take 2 tablets by mouth daily , Disp: , Rfl:      Case Management:  I have reviewed the facility/SNF care plan/MDS, including the falls risk, nutrition and pain screening. I also reviewed the current immunizations, and preventive care.. Future cancer screening is not clinically indicated secondary to age/goals of care Patient's desire to return to the community is present, but is not able due to care needs . Current Level of Care is appropriate.    Advance Directive Discussion:    I reviewed the current advanced directives as reflected in EPIC, the POLST and the facility chart, and verified the congruency of orders. I contacted the first party daughter and discussed the plan of Care.  I did review the advance directives with the resident. Patient's goal is, functional pain control and comfort.    Team Discussion:  I communicated with the appropriate disciplines involved with the Plan of Care:   Nursing    Information reviewed:  Medications, vital signs, orders, and nursing notes.    ROS:  10 point ROS of systems including Constitutional, Eyes, Respiratory, Cardiovascular, Gastroenterology, Genitourinary, Integumentary, Musculoskeletal, Psychiatric were all negative except for pertinent positives noted in my HPI.    Vitals:  /76   Pulse 75   Temp 97.6  F (36.4  C)   Resp 18   Ht 1.549 m (5' 1\")   Wt 66.2 kg (146 lb)   SpO2 97%   BMI 27.59 kg/m   Body mass index is 27.59 kg/m .     Exam:  GENERAL APPEARANCE:  Alert, in no distress, pleasant   ENT:  Mouth and posterior oropharynx normal, dry mucous membranes  EYES:  EOM, conjunctivae, right eye esotropia  RESP:  respiratory effort and palpation of chest normal, lungs clear  CV:  Palpation and auscultation of heart " done , regular rate and rhythm, no murmur, rub, or gallop, no edema   ABDOMEN:  no guarding or rebound, active BS  M/S:   kyphotic stance, generalized weakness, able to move all extremities   SKIN:  intact to visualized areas, warm to touch   NEURO:   Cranial nerves 2-12 are normal tested and grossly at patient's baseline  PSYCH:  normal insight, judgement and memory    Lab/Diagnostic data:     Last Comprehensive Metabolic Panel:  Sodium   Date Value Ref Range Status   11/08/2021 139 136 - 145 mmol/L Final   04/30/2021 141 136 - 141 mmol/L Final     Potassium   Date Value Ref Range Status   11/08/2021 3.5 3.5 - 5.0 mmol/L Final   04/30/2021 3.7 3.5 - 5.0 mmol/L Final     Chloride   Date Value Ref Range Status   11/08/2021 101 98 - 107 mmol/L Final   04/30/2021 103 98 - 107 mmol/L Final     Carbon Dioxide   Date Value Ref Range Status   04/30/2021 32 (A) 22 - 31 mmol/L Final     Carbon Dioxide (CO2)   Date Value Ref Range Status   11/08/2021 26 22 - 31 mmol/L Final     Anion Gap   Date Value Ref Range Status   11/08/2021 12 5 - 18 mmol/L Final   04/30/2021 6 5 - 18 mmol/L Final     Glucose   Date Value Ref Range Status   11/08/2021 99 70 - 125 mg/dL Final   04/30/2021 97 70 - 125 mg/dL Final     Urea Nitrogen   Date Value Ref Range Status   11/08/2021 24 8 - 28 mg/dL Final   04/30/2021 24 8 - 28 mg/dL Final     Creatinine   Date Value Ref Range Status   11/08/2021 0.83 0.60 - 1.10 mg/dL Final   04/30/2021 0.81 0.60 - 1.10 mg/dL Final     GFR Estimate   Date Value Ref Range Status   11/08/2021 62 >60 mL/min/1.73m2 Final     Comment:     As of July 11, 2021, eGFR is calculated by the CKD-EPI creatinine equation, without race adjustment. eGFR can be influenced by muscle mass, exercise, and diet. The reported eGFR is an estimation only and is only applicable if the renal function is stable.   05/25/2021 >60 >60 mL/min/1.73m2 Final   04/30/2021 >60 >60 ml/min/1.73m2 Final     Calcium   Date Value Ref Range Status    11/08/2021 9.0 8.5 - 10.5 mg/dL Final   04/30/2021 8.9 8.5 - 10.5 mg/dL Final     CBC RESULTS: Recent Labs   Lab Test 11/08/21  0638   WBC 5.1   RBC 3.41*   HGB 10.1*   HCT 32.3*   MCV 95   MCH 29.6   MCHC 31.3*   RDW 13.2        TSH   Date Value Ref Range Status   02/02/2021 2.28 0.30 - 5.00 uIU/mL Final      Weight      BP      HR      ASSESSMENT/PLAN  (N39.0) Urinary tract infection without hematuria, site unspecified  (primary encounter diagnosis)  Comment:  -Acute  -Recurrent UTIs  Plan:   -NitroFURantoin macrocrystal-monohydrate         (MACROBID) 100 MG capsule BID x5 days  -Hydration  -Monitor for symptoms and notify providers if worsens    (G47.00) Persistent insomnia  Comment:   -Was on Ambien 5 mg  Plan:   -Switch to zolpidem ER (AMBIEN CR) 6.25 MG CR tablet daily at HS  -Monitor for changes in sleep habits    (R53.81) Physical deconditioning  (M62.81) Muscle weakness (generalized)  (R54) Frail elderly  Comment: No reports of recent fall or trauma  Plan:  -OT/PT  -Monitor for safety and prevent falls    (I11.0) Hypertensive heart disease with heart failure (H)  (I50.32) Chronic diastolic congestive heart failure (H)  (I10) Essential hypertension  (Z86.73) History of CVA (cerebrovascular accident)  (I48.20) Chronic atrial fibrillation (H)  (I70.0) Atherosclerosis of aorta (H)  (I25.10) Coronary artery disease involving native coronary artery without angina pectoris, unspecified whether native or transplanted heart  Comment:  -Hospitalized in 10/2021 for acute hypoxic respiratory failure secondary to acute on chronic CHF likely triggered by elevated BP and UTI  -BP and HR as above  -No chest pain or shortness of breath this morning  -Weight stable  Plan:   -Continue Plavix, losartan, isosorbide, metoprolol and hydrochlorothiazide  -BMP periodically  -Monitor BP and HR  -Labs periodically  -Cardiology follow-up if warranted  -Monitor for exacerbations    (J44.9) Chronic obstructive pulmonary  disease, unspecified COPD type (H)  (J45.40) Moderate persistent asthma without complication  Comment: Chronic  Plan:   -Continue Ventolin HFA aerosol, Spiriva-Respimat, and Advair  -Monitor for changes in respiratory status    (K21.00) Gastroesophageal reflux disease with esophagitis, unspecified whether hemorrhage  Comment: Chronic  Plan:   -Continue pantoprazole  -Monitor for signs and symptoms of heartburn    (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             Sincerely,        AZAEL Senior CNP

## 2022-02-11 NOTE — LETTER
2/11/2022        RE: Ermelinda Pfeiffer  [facility]  [address]  [facility] [address]        [facility] GERIATR[facility]    PRIMARY CARE PROVIDER AND CLINIC:  Jess Juares, DNP,   APRN CNP, [address] [facility] / [place] [address]  Chief Complaint   Patient presents with     Hospital F/U      [facility] Medical Record Number:  [id number]  Place of Service where encounter took place:  [facility] () [[id number]]    HPI:  Ermelinda Pfeiffer  is a 89 year old  (10/19/1930), returned to the above facility from  [facility] . Hospital stay 2/3/22 - 2/8/22.      Patient presented to the hospital with complaints of shortness of breath and hypoxia on 2/3/22 and was found to be in a flutter with RVR likely 2/2 urinary tract infection and possible acute on chronic diastolic congestive heart failure.  O2 sats were in the 70s-80s percent range in the ED. Started on supplemental oxygen, CPAP and transitioned to BiPAP.  Labs were remarkable for WBC 16.2, creatinine 1.09, BUN 21, lactate 2.2, and UA showed moderate blood and large leukocyte Estrace with greater than 100 white blood cells in the ED.  Subsequently started on ceftriaxone for UTI, given diltiazem bolus with rate converted to normal sinus rhythm, and received a dose of Eliquis.  UC later showed greater than 100,000 E. coli.    After admitted to the hospital, metoprolol increased to 50 mg (PTA 37.5 mg) twice a day for rate control.  No diuretics use since it was felt that she was not in a acute CHF.   Atrial flutter with RVR was deemed to be secondary to reactive to UTI.  Of note, patient was diagnosed with UTI one day prior to presenting to the hospital and nitrofurantoin was prescribed by me.  Per chart review, patient had not started taking the medication before presenting to the ED. Discharged back to the facility on stable condition on 2/8 with p.o. antibiotics.    Today, she is laying in bed resting  without any distress. C/o generalized weakness.  Denies cough, fever, chest pain, shortness of breath, nausea, vomiting, abdominal pain, constipation and  symptoms.  Reports tolerating diet.  No reports of antibiotics side effects. On room air.  Hemodynamically stable.  No acute concerns from nursing staff.    CODE STATUS/ADVANCE DIRECTIVES DISCUSSION: DNR / DNI  ALLERGIES:   Allergies   Allergen Reactions     Sulfamethoxazole-Trimethoprim Rash     Levofloxacin Other (See Comments)     Red streaks on the side of nose & red cheeks     Clarithromycin Unknown     Mirtazapine Other (See Comments)     Grogginess in the morning after taking     Penicillin G Other (See Comments)     Penicillin consult. 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  No recurrence of hypercalcemia since     Stone, kidney      Transient ischemic attack       PAST SURGICAL HISTORY:   has a past surgical history that includes Parathyroidectomy (1969) and PERCUTANEOUS TRANSLUMINAL BALLOON ANGIOPLASTY WITH INSERTION OF STENT INTO CORONARY ARTERY N/A  (10/29/1997).  FAMILY HISTORY: family history includes Suicide in her maternal aunt and maternal uncle.  SOCIAL HISTORY:   reports that she has quit smoking. She has a 3.25 pack-year smoking history. She has never used smokeless tobacco. She reports that she does not drink alcohol and does not use  drugs.  Patient's living condition: lives in a nursing home    Post Discharge Medication Reconciliation Status: discharge medications reconciled, continue medications without change  Current Outpatient Medications   Medication Sig     albuterol (PROAIR HFA/PROVENTIL HFA/VENTOLIN HFA) 108 (90 Base) MCG/ACT inhaler Inhale 2 puffs into the lungs every 4 hours as needed for shortness of breath / dyspnea or wheezing     cefuroxime (CEFTIN) 500 MG tablet Take 500 mg by mouth 2 times daily     clopidogrel (PLAVIX) 75 MG tablet Take 75 mg by mouth daily     escitalopram (LEXAPRO) 5 MG tablet Take 5 mg by mouth daily     ferrous fumarate 65 mg, [place]. FE,-Vitamin C 125 mg (VITRON C)  MG TABS tablet Take 1 tablet by mouth daily     fluticasone-salmeterol (ADVAIR) 500-50 MCG/DOSE inhaler Inhale 1 puff into the lungs every 12 hours for Asthma Rinse and spit after each use.     hydrocortisone (CORTAID) 1 % external cream Apply topically 2 times daily     isosorbide mononitrate (IMDUR) 30 MG 24 hr tablet Take 45 mg by mouth daily     lidocaine (LIDODERM) 5 % patch Place 1 patch onto the skin daily as needed for moderate pain To prevent lidocaine toxicity, patient should be patch free for 12 hrs daily.     losartan (COZAAR) 50 MG tablet Take 2 tablets (100 mg) by mouth daily     metoprolol tartrate (LOPRESSOR) 50 MG tablet Take 50 mg by mouth 2 times daily     muscle rub (ARTHRITIS HOT) 10-15 % CREA Apply topically 3 times daily Apply to both heels     nitroGLYcerin (NITROSTAT) 0.4 MG sublingual tablet Place 0.4 mg under the tongue every 5 minutes as needed for chest pain For chest pain place 1 tablet under the tongue every 5 minutes for 3 doses. If symptoms persist 5 minutes after 1st dose call 911.     nystatin (MYCOSTATIN) 004707 UNIT/GM external powder Apply topically 2 times daily      pantoprazole (PROTONIX) 40 MG EC tablet Take 40 mg by mouth daily for Esophagitis Before breakfast     rosuvastatin (CRESTOR) 5 MG tablet  "Take 5 mg by mouth every other day At Bedtime     SENNA-docusate sodium (SENNA S) 8.6-50 MG tablet Take 1 tablet by mouth 2 times daily as needed     tiotropium (SPIRIVA RESPIMAT) 2.5 MCG/ACT inhaler Inhale 2 puffs into the lungs daily     vitamin D3 (CHOLECALCIFEROL) 50 mcg (2000 units) tablet Take 1 tablet by mouth daily     zolpidem (AMBIEN) 5 MG tablet Take 1 tablet (5 mg) by mouth At Bedtime     zolpidem ER (AMBIEN CR) 6.25 MG CR tablet Take 1 tablet (6.25 mg) by mouth nightly as needed for sleep     No current facility-administered medications for this visit.       ROS:  10 point ROS of systems including Constitutional, Eyes, Respiratory, Cardiovascular, Gastroenterology, Genitourinary, Integumentary, Musculoskeletal, Psychiatric were all negative except for pertinent positives noted in my HPI.    Vitals:  BP (!) 148/77   Pulse 78   Temp 97.4  F (36.3  C)   Resp 16   Ht 1.549 m (5' 1\")   Wt 66.6 kg (146 lb 14.4 oz)   SpO2 96%   BMI 27.76 kg/m       Exam:  GENERAL APPEARANCE:  Alert, in no distress, oriented, appears ill  EYES:  EOM, conjunctivae, lids, pupils and irises normal, right eye esotropia unchanged  RESP:  respiratory effort and palpation of chest normal, lungs clear to auscultation , no respiratory distress  CV:  Palpation and auscultation of heart done , regular rate and rhythm, no murmur, rub, or gallop, no edema  ABDOMEN:  normal bowel sounds, soft, nontender, no hepatosplenomegaly or other masses  :    No frequency, urgency or burning  M/S:   In bed today  SKIN:  Inspection of skin and subcutaneous tissue baseline  NEURO:   Cranial nerves 2-12 are normal tested and grossly at patient's baseline  PSYCH:  oriented X 3    Lab/Diagnostic data:          Pulse      ASSESSMENT/PLAN:  (A41.9,  N39.0) Sepsis secondary to UTI (H)  (primary encounter diagnosis)  Comment: Improved , no  symptoms today  Plan:  -Frequent UTIs  -Continue cefuroxime axetil 500 mg BID until end date   -S/p " Macrobid  -Consider prophylactic treatment  -Check CBC and BMP  -Monitor for symptoms    (J96.01) Acute respiratory failure with hypoxia (H)  (R06.02) Shortness of breath  Comment:  -Complicated by septic UTI  -Successfully weaned off oxygen, CPAP, and BiPAP before discharge  -Room air now  -No chest pain or shortness of breath  Plan:  -Continue current care  -Monitor for changes in respiratory status    (I48.92) Atrial flutter, unspecified type (H)  (I50.33) Acute on chronic diastolic congestive heart failure (H)  Comment:  -Complicated by septic UTI  -Improved  -Pulse up to 150 in ER now back to baseline  -Acute on chronic CHF ruled out  -No chest pain or shortness of breath today  Plan:  -Continue metoprolol 50 mg BID  -Continue clopidogrel 75 mg daily  -Monitor BP, heart rate, and changes respiratory status    (R79.89) Elevated TSH  Comment:   -TSH 8.99 in the hospital  -No symptoms of hypothyroidism  -Plan  -Recheck TSH  -Consider pharmacological intervention if continues to elevate  -Monitor for s/s hypothyroidism    (R53.81) Physical deconditioning  (M62.81) Generalized muscle weakness  (R54) Frail elderly  Comment:  -C/o mild weakness  Plan:   -PT/OT  -Fall precautions    Orders:  BMP, CBC    Electronically signed by:  Jess Juares DNP,APRN,AGNP-BC.                         Sincerely,        AZAEL Senior CNP

## 2022-02-11 NOTE — PROGRESS NOTES
[facility] GERIATRICS    PRIMARY CARE PROVIDER AND CLINIC:  Jess Juares, DNP,   APRN CNP, [address] [facility] / [place] [address]  Chief Complaint   Patient presents with     Hospital F/U      [facility] Medical Record Number:  [id number]  Place of Service where encounter took place:  [facility] () [[id number]]    HPI:  Ermelinda Pfeiffer  is a 89 year old  (10/19/1930), returned to the above facility from  [facility] . Hospital stay 2/3/22 - 2/8/22.      Patient presented to the hospital with complaints of shortness of breath and hypoxia on 2/3/22 and was found to be in a flutter with RVR likely 2/2 urinary tract infection and possible acute on chronic diastolic congestive heart failure.  O2 sats were in the 70s-80s percent range in the ED. Started on supplemental oxygen, CPAP and transitioned to BiPAP.  Labs were remarkable for WBC 16.2, creatinine 1.09, BUN 21, lactate 2.2, and UA showed moderate blood and large leukocyte Estrace with greater than 100 white blood cells in the ED.  Subsequently started on ceftriaxone for UTI, given diltiazem bolus with rate converted to normal sinus rhythm, and received a dose of Eliquis.  UC later showed greater than 100,000 E. coli.    After admitted to the hospital, metoprolol increased to 50 mg (PTA 37.5 mg) twice a day for rate control.  No diuretics use since it was felt that she was not in a acute CHF.   Atrial flutter with RVR was deemed to be secondary to reactive to UTI.  Of note, patient was diagnosed with UTI one day prior to presenting to the hospital and nitrofurantoin was prescribed by me.  Per chart review, patient had not started taking the medication before presenting to the ED. Discharged back to the facility on stable condition on 2/8 with p.o. antibiotics.    Today, she is laying in bed resting without any distress. C/o generalized weakness.  Denies cough, fever, chest pain, shortness of breath, nausea, vomiting,  abdominal pain, constipation and  symptoms.  Reports tolerating diet.  No reports of antibiotics side effects. On room air.  Hemodynamically stable.  No acute concerns from nursing staff.    CODE STATUS/ADVANCE DIRECTIVES DISCUSSION: DNR / DNI  ALLERGIES:   Allergies   Allergen Reactions     Sulfamethoxazole-Trimethoprim Rash     Levofloxacin Other (See Comments)     Red streaks on the side of nose & red cheeks     Clarithromycin Unknown     Mirtazapine Other (See Comments)     Grogginess in the morning after taking     Penicillin G Other (See Comments)     Penicillin consult. 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  No recurrence of hypercalcemia since     Stone, kidney      Transient ischemic attack       PAST SURGICAL HISTORY:   has a past surgical history that includes Parathyroidectomy (1969) and PERCUTANEOUS TRANSLUMINAL BALLOON ANGIOPLASTY WITH INSERTION OF STENT INTO CORONARY ARTERY N/A  (10/29/1997).  FAMILY HISTORY: family history includes Suicide in her maternal aunt and maternal uncle.  SOCIAL HISTORY:   reports that she has quit smoking. She has a 3.25 pack-year smoking history. She has never used smokeless tobacco. She reports that she does not drink alcohol and does not use drugs.  Patient's living condition: lives in a nursing home    Post Discharge Medication Reconciliation Status: discharge medications  reconciled, continue medications without change  Current Outpatient Medications   Medication Sig     albuterol (PROAIR HFA/PROVENTIL HFA/VENTOLIN HFA) 108 (90 Base) MCG/ACT inhaler Inhale 2 puffs into the lungs every 4 hours as needed for shortness of breath / dyspnea or wheezing     cefuroxime (CEFTIN) 500 MG tablet Take 500 mg by mouth 2 times daily     clopidogrel (PLAVIX) 75 MG tablet Take 75 mg by mouth daily     escitalopram (LEXAPRO) 5 MG tablet Take 5 mg by mouth daily     ferrous fumarate 65 mg, Habematolel. FE,-Vitamin C 125 mg (VITRON C)  MG TABS tablet Take 1 tablet by mouth daily     fluticasone-salmeterol (ADVAIR) 500-50 MCG/DOSE inhaler Inhale 1 puff into the lungs every 12 hours for Asthma Rinse and spit after each use.     hydrocortisone (CORTAID) 1 % external cream Apply topically 2 times daily     isosorbide mononitrate (IMDUR) 30 MG 24 hr tablet Take 45 mg by mouth daily     lidocaine (LIDODERM) 5 % patch Place 1 patch onto the skin daily as needed for moderate pain To prevent lidocaine toxicity, patient should be patch free for 12 hrs daily.     losartan (COZAAR) 50 MG tablet Take 2 tablets (100 mg) by mouth daily     metoprolol tartrate (LOPRESSOR) 50 MG tablet Take 50 mg by mouth 2 times daily     muscle rub (ARTHRITIS HOT) 10-15 % CREA Apply topically 3 times daily Apply to both heels     nitroGLYcerin (NITROSTAT) 0.4 MG sublingual tablet Place 0.4 mg under the tongue every 5 minutes as needed for chest pain For chest pain place 1 tablet under the tongue every 5 minutes for 3 doses. If symptoms persist 5 minutes after 1st dose call 911.     nystatin (MYCOSTATIN) 329154 UNIT/GM external powder Apply topically 2 times daily      pantoprazole (PROTONIX) 40 MG EC tablet Take 40 mg by mouth daily for Esophagitis Before breakfast     rosuvastatin (CRESTOR) 5 MG tablet Take 5 mg by mouth every other day At Bedtime     SENNA-docusate sodium (SENNA S) 8.6-50 MG tablet Take 1 tablet by mouth 2 times  "daily as needed     tiotropium (SPIRIVA RESPIMAT) 2.5 MCG/ACT inhaler Inhale 2 puffs into the lungs daily     vitamin D3 (CHOLECALCIFEROL) 50 mcg (2000 units) tablet Take 1 tablet by mouth daily     zolpidem (AMBIEN) 5 MG tablet Take 1 tablet (5 mg) by mouth At Bedtime     zolpidem ER (AMBIEN CR) 6.25 MG CR tablet Take 1 tablet (6.25 mg) by mouth nightly as needed for sleep     No current facility-administered medications for this visit.       ROS:  10 point ROS of systems including Constitutional, Eyes, Respiratory, Cardiovascular, Gastroenterology, Genitourinary, Integumentary, Musculoskeletal, Psychiatric were all negative except for pertinent positives noted in my HPI.    Vitals:  BP (!) 148/77   Pulse 78   Temp 97.4  F (36.3  C)   Resp 16   Ht 1.549 m (5' 1\")   Wt 66.6 kg (146 lb 14.4 oz)   SpO2 96%   BMI 27.76 kg/m       Exam:  GENERAL APPEARANCE:  Alert, in no distress, oriented, appears ill  EYES:  EOM, conjunctivae, lids, pupils and irises normal, right eye esotropia unchanged  RESP:  respiratory effort and palpation of chest normal, lungs clear to auscultation , no respiratory distress  CV:  Palpation and auscultation of heart done , regular rate and rhythm, no murmur, rub, or gallop, no edema  ABDOMEN:  normal bowel sounds, soft, nontender, no hepatosplenomegaly or other masses  :    No frequency, urgency or burning  M/S:   In bed today  SKIN:  Inspection of skin and subcutaneous tissue baseline  NEURO:   Cranial nerves 2-12 are normal tested and grossly at patient's baseline  PSYCH:  oriented X 3    Lab/Diagnostic data:          Pulse      ASSESSMENT/PLAN:  (A41.9,  N39.0) Sepsis secondary to UTI (H)  (primary encounter diagnosis)  Comment: Improved , no  symptoms today  Plan:  -Frequent UTIs  -Continue cefuroxime axetil 500 mg BID until end date   -S/p Macrobid  -Consider prophylactic treatment  -Check CBC and BMP  -Monitor for symptoms    (J96.01) Acute respiratory failure with hypoxia " (H)  (R06.02) Shortness of breath  Comment:  -Complicated by septic UTI  -Successfully weaned off oxygen, CPAP, and BiPAP before discharge  -Room air now  -No chest pain or shortness of breath  Plan:  -Continue current care  -Monitor for changes in respiratory status    (I48.92) Atrial flutter, unspecified type (H)  (I50.33) Acute on chronic diastolic congestive heart failure (H)  Comment:  -Complicated by septic UTI  -Improved  -Pulse up to 150 in ER now back to baseline  -Acute on chronic CHF ruled out  -No chest pain or shortness of breath today  Plan:  -Continue metoprolol 50 mg BID  -Continue clopidogrel 75 mg daily  -Monitor BP, heart rate, and changes respiratory status    (R79.89) Elevated TSH  Comment:   -TSH 8.99 in the hospital  -No symptoms of hypothyroidism  -Plan  -Recheck TSH  -Consider pharmacological intervention if continues to elevate  -Monitor for s/s hypothyroidism    (R53.81) Physical deconditioning  (M62.81) Generalized muscle weakness  (R54) Frail elderly  Comment:  -C/o mild weakness  Plan:   -PT/OT  -Fall precautions    Orders:  BMP, CBC    Electronically signed by:  Jess Juares,DNP,APRN,AGNP-BC.

## 2022-02-21 NOTE — PROGRESS NOTES
"[facility] GERIATRICS    Chief Complaint   Patient presents with     Nursing Home Acute     HPI:  Ermelinda Pfeiffer is a 89 year old  (10/19/1930), who is being seen today [facility] [[id number] HPI information obtained from: facility chart records, facility staff, patient report and [facility] chart review.Today's concern is: Recheck    Patient was hospitalized from 2/3-2/8/2022 for complications from UTI.  She is being seen today for routine follow-up.  In bed laying comfortably.  Denies cough, fevers, chills, chest pain, shortness of breath, abdominal pain or  symptoms.  Reports eating and sleeping well.     BP fluctuates.  Will add hydralazine 10 mg 3 times daily for better control.  No other acute concerns from patient or staff.    Allergies, and PMH/PSH reviewed in EPIC today.    REVIEW OF SYSTEMS:  4 point ROS including Respiratory, CV, GI and , other than that noted in the HPI,  is negative.    Objective:   /72   Pulse 73   Temp 97.5  F (36.4  C)   Resp 18   Ht 1.549 m (5' 1\")   Wt 69.4 kg (153 lb)   SpO2 95%   BMI 28.91 kg/m       EXAM:  GENERAL APPEARANCE: Well groomed, well-nourished, appropriately dressed, chronically ill-appearing  HEENT-NOSE/MOUTH/THROAT: No nasal drainage or erythema, mucous membranes moist, no throat erythema   RESPIRATORY: Clear to auscultation, even and unlabored, symmetrical chest wall expansion  CARDIOVASCULAR: RRR, no peripheral edema, S1/S2 normal, pulses WDL  GASTROINTESTINAL: Soft, nontender, nondistended, bowel sounds present x4 quadrants  GENITOURINARY: No retention, no CVA tenderness  MUSCULOSKELETAL: No joint deformities   INTEGUMENTARY: Warm, dry, and intact  NEUROLOGICAL: Alert and awake, forgetful at times  PSYCHOLOGICAL: Cooperative, socially appropriate    Labs  Last Comprehensive Metabolic Panel:  Sodium   Date Value Ref Range Status   02/14/2022 140 136 - 145 mmol/L Final   04/30/2021 141 " 136 - 141 mmol/L Final     Potassium   Date Value Ref Range Status   02/14/2022 4.0 3.5 - 5.0 mmol/L Final   04/30/2021 3.7 3.5 - 5.0 mmol/L Final     Chloride   Date Value Ref Range Status   02/14/2022 107 98 - 107 mmol/L Final   04/30/2021 103 98 - 107 mmol/L Final     Carbon Dioxide   Date Value Ref Range Status   04/30/2021 32 (A) 22 - 31 mmol/L Final     Carbon Dioxide (CO2)   Date Value Ref Range Status   02/14/2022 27 22 - 31 mmol/L Final     Anion Gap   Date Value Ref Range Status   02/14/2022 6 5 - 18 mmol/L Final   04/30/2021 6 5 - 18 mmol/L Final     Glucose   Date Value Ref Range Status   02/14/2022 99 70 - 125 mg/dL Final   04/30/2021 97 70 - 125 mg/dL Final     Urea Nitrogen   Date Value Ref Range Status   02/14/2022 13 8 - 28 mg/dL Final   04/30/2021 24 8 - 28 mg/dL Final     Creatinine   Date Value Ref Range Status   02/14/2022 0.82 0.60 - 1.10 mg/dL Final   04/30/2021 0.81 0.60 - 1.10 mg/dL Final     GFR Estimate   Date Value Ref Range Status   02/14/2022 67 >60 mL/min/1.73m2 Final     Comment:     Effective December 21, 2021 eGFRcr in adults is calculated using the 2021 CKD-EPI creatinine equation which includes age and gender (Yajaira palomo al., NEJ, DOI: 10.1056/VNRUvd2317575)   05/25/2021 >60 >60 mL/min/1.73m2 Final   04/30/2021 >60 >60 ml/min/1.73m2 Final     Calcium   Date Value Ref Range Status   02/14/2022 9.1 8.5 - 10.5 mg/dL Final   04/30/2021 8.9 8.5 - 10.5 mg/dL Final     CBC RESULTS: Recent Labs   Lab Test 02/14/22  0624   WBC 6.5   RBC 3.36*   HGB 10.0*   HCT 32.4*   MCV 96   MCH 29.8   MCHC 30.9*   RDW 13.8        TSH   Date Value Ref Range Status   02/15/2022 3.14 0.30 - 5.00 uIU/mL Final     BP      Pulse      Assessment/Plan:  (A41.9,  N39.0) Sepsis secondary to UTI (H)  (primary encounter diagnosis)  Comment: Resolved  -S/p antibiotics  -No  symptoms  -Afebrile  Plan:  -Consider prophylactic treatment due to frequent UTIs  -Hydration  -Monitor for s/s of UTI    (I48.92)  Atrial flutter, unspecified type (H)  Comment: A-flutter with RVR in the hospital complicated by UTI, no chest pain or shortness of breath today  Plan:  -Continue metoprolol 50 mg BID and clopidogrel 75 mg daily  -Monitor pulse    (I10) Essential hypertension  Comment: BP fluctuates  Plan:   -Continue metoprolol 50 mg twice daily and losartan 100 mg daily  -Add hydrALAZINE (APRESOLINE) 10 MG tablet 3 times daily  -BMP periodically  -Monitor BP and adjust medications as indicated    Orders:  Hydralazine 10 mg 3 times daily    Electronically signed by:   Jess Juares, BLAS, APRN, AGNP-BC.

## 2022-02-21 NOTE — LETTER
"    2/21/2022        RE: Ermelinda Pfeiffer  [facility]  [address]  [facility] [address]        [facility] GERIATRICS    Chief Complaint   Patient presents with     Nursing Home Acute     HPI:  Ermelinda Pfeiffer is a 89 year old  (10/19/1930), who is being seen today for an episodic care visit at: [facility] () [[id number]]. HPI information obtained from: facility chart records, facility staff, patient report and [facility] chart review.Today's concern is: Recheck    Patient was hospitalized from 2/3-2/8/2022 for complications from UTI.  She is being seen today for routine follow-up.  In bed laying comfortably.  Denies cough, fevers, chills, chest pain, shortness of breath, abdominal pain or  symptoms.  Reports eating and sleeping well.     BP fluctuates.  Will add hydralazine 10 mg 3 times daily for better control.  No other acute concerns from patient or staff.    Allergies, and PMH/PSH reviewed in [facility] today.    REVIEW OF SYSTEMS:  4 point ROS including Respiratory, CV, GI and , other than that noted in the HPI,  is negative.    Objective:   /72   Pulse 73   Temp 97.5  F (36.4  C)   Resp 18   Ht 1.549 m (5' 1\")   Wt 69.4 kg (153 lb)   SpO2 95%   BMI 28.91 kg/m       EXAM:  GENERAL APPEARANCE: Well groomed, well-nourished, appropriately dressed, chronically ill-appearing  HEENT-NOSE/MOUTH/THROAT: No nasal drainage or erythema, mucous membranes moist, no throat erythema   RESPIRATORY: Clear to auscultation, even and unlabored, symmetrical chest wall expansion  CARDIOVASCULAR: RRR, no peripheral edema, S1/S2 normal, pulses WDL  GASTROINTESTINAL: Soft, nontender, nondistended, bowel sounds present x4 quadrants  GENITOURINARY: No retention, no CVA tenderness  MUSCULOSKELETAL: No joint deformities   INTEGUMENTARY: Warm, dry, and intact  NEUROLOGICAL: Alert and awake, forgetful at times  PSYCHOLOGICAL: Cooperative, socially appropriate    Labs  Last " Comprehensive Metabolic Panel:  Sodium   Date Value Ref Range Status   02/14/2022 140 136 - 145 mmol/L Final   04/30/2021 141 136 - 141 mmol/L Final     Potassium   Date Value Ref Range Status   02/14/2022 4.0 3.5 - 5.0 mmol/L Final   04/30/2021 3.7 3.5 - 5.0 mmol/L Final     Chloride   Date Value Ref Range Status   02/14/2022 107 98 - 107 mmol/L Final   04/30/2021 103 98 - 107 mmol/L Final     Carbon Dioxide   Date Value Ref Range Status   04/30/2021 32 (A) 22 - 31 mmol/L Final     Carbon Dioxide (CO2)   Date Value Ref Range Status   02/14/2022 27 22 - 31 mmol/L Final     Anion Gap   Date Value Ref Range Status   02/14/2022 6 5 - 18 mmol/L Final   04/30/2021 6 5 - 18 mmol/L Final     Glucose   Date Value Ref Range Status   02/14/2022 99 70 - 125 mg/dL Final   04/30/2021 97 70 - 125 mg/dL Final     Urea Nitrogen   Date Value Ref Range Status   02/14/2022 13 8 - 28 mg/dL Final   04/30/2021 24 8 - 28 mg/dL Final     Creatinine   Date Value Ref Range Status   02/14/2022 0.82 0.60 - 1.10 mg/dL Final   04/30/2021 0.81 0.60 - 1.10 mg/dL Final     GFR Estimate   Date Value Ref Range Status   02/14/2022 67 >60 mL/min/1.73m2 Final     Comment:     Effective December 21, 2021 eGFRcr in adults is calculated using the 2021 CKD-EPI creatinine equation which includes age and gender (Yajaira et al., NEJ, DOI: 10.1056/EFWOqp9793089)   05/25/2021 >60 >60 mL/min/1.73m2 Final   04/30/2021 >60 >60 ml/min/1.73m2 Final     Calcium   Date Value Ref Range Status   02/14/2022 9.1 8.5 - 10.5 mg/dL Final   04/30/2021 8.9 8.5 - 10.5 mg/dL Final     CBC RESULTS: Recent Labs   Lab Test 02/14/22  0624   WBC 6.5   RBC 3.36*   HGB 10.0*   HCT 32.4*   MCV 96   MCH 29.8   MCHC 30.9*   RDW 13.8        TSH   Date Value Ref Range Status   02/15/2022 3.14 0.30 - 5.00 uIU/mL Final     BP      Pulse      Assessment/Plan:  (A41.9,  N39.0) Sepsis secondary to UTI (H)  (primary encounter diagnosis)  Comment: Resolved  -S/p antibiotics  -No   symptoms  -Afebrile  Plan:  -Consider prophylactic treatment due to frequent UTIs  -Hydration  -Monitor for s/s of UTI    (I48.92) Atrial flutter, unspecified type (H)  Comment: A-flutter with RVR in the hospital complicated by UTI, no chest pain or shortness of breath today  Plan:  -Continue metoprolol 50 mg BID and clopidogrel 75 mg daily  -Monitor pulse    (I10) Essential hypertension  Comment: BP fluctuates  Plan:   -Continue metoprolol 50 mg twice daily and losartan 100 mg daily  -Add hydrALAZINE (APRESOLINE) 10 MG tablet 3 times daily  -BMP periodically  -Monitor BP and adjust medications as indicated    Orders:  Hydralazine 10 mg 3 times daily    Electronically signed by:   Jess Juares, BLAS, APRVICTORIANO, AGNP-BC.             Sincerely,        AZAEL Senior CNP

## 2022-02-26 NOTE — TELEPHONE ENCOUNTER
[place] [facility] TRIAGE ENCOUNTER    Chief Complaint   Patient presents with     Fall       Ermelinda Pfeiffer is a 89 year old  (10/19/1930), Nurse called today to report: patient with a fall today due to increased weakness. No other complaints. VSS. No injuries    ASSESSMENT/PLAN    PT/OT to eval and treat    Continue to monitor vitals and neuros    Electronically signed by:   Deysi Harris, NP

## 2022-03-11 NOTE — TELEPHONE ENCOUNTER
[facility] Nurse Triage Telephone Note    Provider: AZAEL Nash CNP  Facility: [facility]   Facility Type:  [facility]    Caller: Shine  Call Back Number: [phone]    Allergies   Allergen Reactions     Sulfamethoxazole-Trimethoprim Rash     Levofloxacin Other (See Comments)     Red streaks on the side of nose & red cheeks     Clarithromycin Unknown     Mirtazapine Other (See Comments)     Grogginess in the morning after taking     Penicillin G Other (See Comments)     Penicillin consult. May use penicillin and cephalosporin - 4/24/19        Reason for call: Pt c/o sudden onset of acute right arm pain described as sharp and throbbing radiating to low back. This lasted for approx 5 minutes and did not need any intervention, resolved itself. No recent fall or injury noted.     Verbal Order/Direction given by Provider: FYI to provider.   [facility] STANDING ORDER GIVEN:  Acetaminophen 650 mg PO q 4 hours PRN for pain/fever (acetaminophen not to exceed 4 grams per 24 hours)      Provider giving Order:  AZAEL Lara CNP    Verbal Order given to: Shine García RN

## 2022-03-31 NOTE — TELEPHONE ENCOUNTER
Was to start comfort meds, ativan, MS, while waiting for hospice start. RN states RX not sent to pharm.

## 2023-07-14 NOTE — LETTER
11/2/2020        RE: Ermelinda Pfeiffer  [address]  [address]  [facility] [address]        [facility]  Chief Complaint   Patient presents with     half-way Regulatory     [facility] Record Number: [id number]  Place of Service where encounter took place: [facility] ([facility]) [[id number]]    HPI:    Ermelinda Pfeiffer is 89 year old (10/19/1930), who is being seen today for a federally mandated E/M visit. HPI information obtained from: facility chart records, patient report and [facility] chart review. Today's concerns are:    Seen today for follow up HTN, deconditioning, insomnia and other chronic conditions. Enjoyed her JOYCELYN with family and was happy to see her son from [place]. No acute concerns. Resting in bed and had been up in chair for a while.    By history has chronic diastolic heart failure, CAD with stent placement in 92' and 97', atrial fibrillation, pontine CVA in 2019, and osteoporosis. She  lived at [facility] in [place], [place] was hospitalized for UTI, developed a-fib with RVR so started on metoprolol.  Also restarted on PPI for a hx of esophagitis; had some black stool. Was felt unsafe to return to [place].    ALLERGIES:Sulfamethoxazole-trimethoprim, Levofloxacin, Clarithromycin, Mirtazapine, and Penicillin g  PAST MEDICAL HISTORY:   has a past medical history of Acute sinus infection, Anemia, CAD (coronary artery disease), Cancer of the skin, basal cell, Cataract, Depressive disorder, Diplopia, Disturbance, sleep, GERD (gastroesophageal reflux disease), HTN (hypertension), Hyperlipidemia, Insomnia, Myocardial infarction (H), Osteoporosis, Skin cancer, Status post parathyroidectomy (05/23/2016), Stone, kidney, and Transient ischemic attack.  PAST SURGICAL HISTORY:   has a past surgical history that includes Parathyroidectomy (1969) and PERCUTANEOUS TRANSLUMINAL BALLOON ANGIOPLASTY WITH INSERTION OF STENT INTO CORONARY ARTERY N/A   Add colchicine 0.6 mg twice daily x2 weeks   (10/29/1997).  FAMILY HISTORY: family history includes Suicide in her maternal aunt and maternal uncle.  SOCIAL HISTORY:  reports that she has quit smoking. She has a 3.25 pack-year smoking history. She has never used smokeless tobacco. She reports that she does not drink alcohol or use drugs.    MEDICATIONS:  Current Outpatient Medications   Medication Sig Dispense Refill     albuterol (PROAIR HFA/PROVENTIL HFA/VENTOLIN HFA) 108 (90 Base) MCG/ACT inhaler Inhale 2 puffs into the lungs every 4 hours as needed for shortness of breath / dyspnea or wheezing       clopidogrel (PLAVIX) 75 MG tablet Take 75 mg by mouth daily       escitalopram (LEXAPRO) 5 MG tablet Take 5 mg by mouth daily       ferrous fumarate 65 mg, [place]. FE,-Vitamin C 125 mg (VITRON C)  MG TABS tablet Take 1 tablet by mouth daily       fluticasone-salmeterol (ADVAIR) 500-50 MCG/DOSE inhaler Inhale 1 puff into the lungs every 12 hours for Asthma Rinse and spit after each use.       hydrochlorothiazide (HYDRODIURIL) 25 MG tablet Take 25 mg by mouth daily every other day for blood pressure and fluid managment       isosorbide mononitrate (IMDUR) 30 MG 24 hr tablet Take 30 mg by mouth daily       lidocaine (LIDODERM) 5 % patch Place 1 patch onto the skin daily as needed for moderate pain To prevent lidocaine toxicity, patient should be patch free for 12 hrs daily.       losartan (COZAAR) 50 MG tablet Take 1.5 tablets (75 mg) by mouth daily       melatonin 3 MG tablet Take 6 mg by mouth nightly as needed        metoprolol tartrate (LOPRESSOR) 25 MG tablet Take 12.5 mg by mouth 2 times daily       nitroGLYcerin (NITROSTAT) 0.4 MG sublingual tablet Place 0.4 mg under the tongue every 5 minutes as needed for chest pain For chest pain place 1 tablet under the tongue every 5 minutes for 3 doses. If symptoms persist 5 minutes after 1st dose call 911.       pantoprazole (PROTONIX) 40 MG EC tablet Take 40 mg by mouth daily for Esophagitis Before breakfast    "    rosuvastatin (CRESTOR) 5 MG tablet Take 5 mg by mouth every other day At Bedtime       SENNA-docusate sodium (SENNA S) 8.6-50 MG tablet Take 1 tablet by mouth 2 times daily as needed       Vitamin D, Cholecalciferol, 25 MCG (1000 UT) TABS Take 1 tablet by mouth daily       zolpidem (AMBIEN) 5 MG tablet Take 2.5 mg by mouth nightly as needed for sleep         Case Management:  I have reviewed the care plan and MDS and do agree with the plan. Patient's desire to return to the community is present, but is not able due to care needs . Information reviewed:  Medications, vital signs, orders, and nursing notes.    ROS:  4 point ROS including Respiratory, CV, GI and , other than that noted in the HPI,  is negative    Vitals:  /64   Pulse 79   Temp 98.6  F (37  C)   Resp 18   Ht 1.549 m (5' 1\")   Wt 60.6 kg (133 lb 8 oz)   SpO2 97%   BMI 25.22 kg/m    Body mass index is 25.22 kg/m .  Exam:  GENERAL APPEARANCE:  Alert, in no distress  ENT:  Mouth and posterior oropharynx normal, dry mucous membranes  EYES:  EOM, conjunctivae, right eye esotropia  RESP:  respiratory effort and palpation of chest normal, lungs clear to auscultation   CV:  Palpation and auscultation of heart done , regular rate and rhythm, no murmur, rub, or gallop  ABDOMEN:  no guarding or rebound  M/S:   kyphotic stance, some generalized weakness above baseline  SKIN:  intact to visualized areas  NEURO:   Cranial nerves 2-12 are normal tested and grossly at patient's baseline  PSYCH:  normal insight, judgement and memory    Lab/Diagnostic data:   CBC RESULTS:   Recent Labs   Lab Test 08/05/20   WBC 7.7   RBC 3.87   HGB 11.2*   HCT 37.6   MCV 97   MCH 28.9   MCHC 29.8*   RDW 13.4          Last Basic Metabolic Panel:  Recent Labs   Lab Test 08/05/20      POTASSIUM 3.8   CHLORIDE 104   NELSON 8.7   CO2 28   BUN 18   CR 0.78   GLC 89       Liver Function Studies - No results for input(s): PROTTOTAL, ALBUMIN, BILITOTAL, ALKPHOS, AST, " ALT, BILIDIRECT in the last 10357 hours.    No results found for: TSH]    No results found for: A1C    ASSESSMENT/PLAN  (G47.00) Insomnia, unspecified type  (primary encounter diagnosis)  Comment: Mostly wants and takes ambien. Had daytime grogginess with mirtazapine and trazodone in the past   Plan:   --melatonin 6 mg at HS prn mostly does not use  --ambien 2.5 mg daily PRN but parameters not to administer after 2300     (I50.32) Chronic diastolic congestive heart failure (H)  (I48.20) Chronic atrial fibrillation (H)  (I11.9) Hypertensive heart disease without heart failure  (Z86.73) History of CVA (cerebrovascular accident)  (E78.49) Other hyperlipidemia  Comment: Vision changes from CVA. BP average 149/69 HR 69-74. Weight 132-135 lbs.  Plan:   --continue with plavix 75 mg daily  --hydrochlorothiazide 25 mg every other daily  --losartan 75 mg daily  --isosorbide 30 mg daily  --metoprolol 12.5 mg po BID  --Continue to monitor blood pressure and adjust medications as needed.  --BMP periodically     (F32.9) Major depressive disorder, remission status unspecified, unspecified whether recurrent  Comment: does have some frustrations with caregivers at LTC that can exacerbate depressed mood  Plan:   -lexapro 5 mg po daily  -ACP therapy        -consent today for annual influenza vaccine so was ordered        Electronically signed by:  AZAEL Martínez CNP              Sincerely,        AZAEL Martínez CNP